# Patient Record
Sex: MALE | Race: BLACK OR AFRICAN AMERICAN | Employment: FULL TIME | URBAN - METROPOLITAN AREA
[De-identification: names, ages, dates, MRNs, and addresses within clinical notes are randomized per-mention and may not be internally consistent; named-entity substitution may affect disease eponyms.]

---

## 2018-06-01 ENCOUNTER — OFFICE VISIT (OUTPATIENT)
Dept: FAMILY MEDICINE CLINIC | Age: 48
End: 2018-06-01
Payer: COMMERCIAL

## 2018-06-01 VITALS
BODY MASS INDEX: 34.72 KG/M2 | SYSTOLIC BLOOD PRESSURE: 213 MMHG | HEART RATE: 63 BPM | DIASTOLIC BLOOD PRESSURE: 125 MMHG | WEIGHT: 248 LBS | HEIGHT: 71 IN | RESPIRATION RATE: 16 BRPM

## 2018-06-01 DIAGNOSIS — G57.01 PIRIFORMIS SYNDROME OF RIGHT SIDE: ICD-10-CM

## 2018-06-01 DIAGNOSIS — I10 ESSENTIAL HYPERTENSION: Primary | ICD-10-CM

## 2018-06-01 DIAGNOSIS — Z00.00 HEALTHCARE MAINTENANCE: ICD-10-CM

## 2018-06-01 PROCEDURE — G8427 DOCREV CUR MEDS BY ELIG CLIN: HCPCS | Performed by: STUDENT IN AN ORGANIZED HEALTH CARE EDUCATION/TRAINING PROGRAM

## 2018-06-01 PROCEDURE — G8417 CALC BMI ABV UP PARAM F/U: HCPCS | Performed by: STUDENT IN AN ORGANIZED HEALTH CARE EDUCATION/TRAINING PROGRAM

## 2018-06-01 PROCEDURE — 99203 OFFICE O/P NEW LOW 30 MIN: CPT | Performed by: STUDENT IN AN ORGANIZED HEALTH CARE EDUCATION/TRAINING PROGRAM

## 2018-06-01 PROCEDURE — 4004F PT TOBACCO SCREEN RCVD TLK: CPT | Performed by: STUDENT IN AN ORGANIZED HEALTH CARE EDUCATION/TRAINING PROGRAM

## 2018-06-01 PROCEDURE — 93000 ELECTROCARDIOGRAM COMPLETE: CPT | Performed by: STUDENT IN AN ORGANIZED HEALTH CARE EDUCATION/TRAINING PROGRAM

## 2018-06-01 RX ORDER — LISINOPRIL AND HYDROCHLOROTHIAZIDE 20; 12.5 MG/1; MG/1
1 TABLET ORAL DAILY
Qty: 15 TABLET | Refills: 0 | Status: SHIPPED | OUTPATIENT
Start: 2018-06-01 | End: 2019-01-10 | Stop reason: SDUPTHER

## 2018-06-01 RX ORDER — LISINOPRIL AND HYDROCHLOROTHIAZIDE 20; 12.5 MG/1; MG/1
1 TABLET ORAL DAILY
Qty: 30 TABLET | Refills: 3 | Status: SHIPPED | OUTPATIENT
Start: 2018-06-01 | End: 2018-06-01 | Stop reason: CLARIF

## 2018-06-01 RX ORDER — IBUPROFEN 200 MG
200 TABLET ORAL EVERY 6 HOURS PRN
COMMUNITY

## 2018-06-01 ASSESSMENT — ENCOUNTER SYMPTOMS
SHORTNESS OF BREATH: 1
NAUSEA: 0
VOMITING: 0
HEARTBURN: 0
BLURRED VISION: 0
ORTHOPNEA: 0
BACK PAIN: 1
WHEEZING: 0
SORE THROAT: 0
CONSTIPATION: 0
ABDOMINAL PAIN: 0
COUGH: 0
DIARRHEA: 0

## 2019-01-10 DIAGNOSIS — I10 ESSENTIAL HYPERTENSION: ICD-10-CM

## 2019-01-10 RX ORDER — LISINOPRIL AND HYDROCHLOROTHIAZIDE 20; 12.5 MG/1; MG/1
1 TABLET ORAL DAILY
Qty: 30 TABLET | Refills: 0 | Status: SHIPPED | OUTPATIENT
Start: 2019-01-10 | End: 2019-02-01 | Stop reason: SDUPTHER

## 2019-01-21 ENCOUNTER — TELEPHONE (OUTPATIENT)
Dept: ADMINISTRATIVE | Age: 49
End: 2019-01-21

## 2019-02-01 ENCOUNTER — OFFICE VISIT (OUTPATIENT)
Dept: FAMILY MEDICINE CLINIC | Age: 49
End: 2019-02-01
Payer: COMMERCIAL

## 2019-02-01 ENCOUNTER — HOSPITAL ENCOUNTER (OUTPATIENT)
Age: 49
Discharge: HOME | End: 2019-02-03
Payer: COMMERCIAL

## 2019-02-01 VITALS
DIASTOLIC BLOOD PRESSURE: 82 MMHG | RESPIRATION RATE: 18 BRPM | OXYGEN SATURATION: 98 % | BODY MASS INDEX: 33.32 KG/M2 | SYSTOLIC BLOOD PRESSURE: 138 MMHG | HEIGHT: 71 IN | HEART RATE: 78 BPM | WEIGHT: 238 LBS

## 2019-02-01 DIAGNOSIS — Z23 NEED FOR TDAP VACCINATION: ICD-10-CM

## 2019-02-01 DIAGNOSIS — I10 ESSENTIAL HYPERTENSION: ICD-10-CM

## 2019-02-01 DIAGNOSIS — R31.29 MICROHEMATURIA: Primary | ICD-10-CM

## 2019-02-01 DIAGNOSIS — R31.29 MICROHEMATURIA: ICD-10-CM

## 2019-02-01 DIAGNOSIS — Z11.3 SCREENING FOR STD (SEXUALLY TRANSMITTED DISEASE): ICD-10-CM

## 2019-02-01 LAB
APPEARANCE FLUID: ABNORMAL
BACTERIA: ABNORMAL /HPF
BILIRUBIN URINE: NEGATIVE
BILIRUBIN, POC: ABNORMAL
BLOOD URINE, POC: ABNORMAL
BLOOD, URINE: ABNORMAL
CLARITY, POC: ABNORMAL
CLARITY: ABNORMAL
COLOR, POC: YELLOW
COLOR: YELLOW
GLUCOSE URINE, POC: ABNORMAL
GLUCOSE URINE: NEGATIVE MG/DL
KETONES, POC: ABNORMAL
KETONES, URINE: NEGATIVE MG/DL
LEUKOCYTE EST, POC: ABNORMAL
LEUKOCYTE ESTERASE, URINE: ABNORMAL
NITRITE, POC: POSITIVE
NITRITE, URINE: POSITIVE
PH UA: 6 (ref 5–9)
PH, POC: 5.5
PROTEIN UA: NEGATIVE MG/DL
PROTEIN, POC: ABNORMAL
RBC UA: ABNORMAL /HPF (ref 0–2)
SPECIFIC GRAVITY UA: 1.02 (ref 1–1.03)
SPECIFIC GRAVITY, POC: 1.01
UROBILINOGEN, POC: 0.2
UROBILINOGEN, URINE: 0.2 E.U./DL
WBC UA: ABNORMAL /HPF (ref 0–5)

## 2019-02-01 PROCEDURE — 87491 CHLMYD TRACH DNA AMP PROBE: CPT

## 2019-02-01 PROCEDURE — G8417 CALC BMI ABV UP PARAM F/U: HCPCS | Performed by: STUDENT IN AN ORGANIZED HEALTH CARE EDUCATION/TRAINING PROGRAM

## 2019-02-01 PROCEDURE — G8427 DOCREV CUR MEDS BY ELIG CLIN: HCPCS | Performed by: STUDENT IN AN ORGANIZED HEALTH CARE EDUCATION/TRAINING PROGRAM

## 2019-02-01 PROCEDURE — 81002 URINALYSIS NONAUTO W/O SCOPE: CPT | Performed by: STUDENT IN AN ORGANIZED HEALTH CARE EDUCATION/TRAINING PROGRAM

## 2019-02-01 PROCEDURE — 4004F PT TOBACCO SCREEN RCVD TLK: CPT | Performed by: STUDENT IN AN ORGANIZED HEALTH CARE EDUCATION/TRAINING PROGRAM

## 2019-02-01 PROCEDURE — 87077 CULTURE AEROBIC IDENTIFY: CPT

## 2019-02-01 PROCEDURE — G8484 FLU IMMUNIZE NO ADMIN: HCPCS | Performed by: STUDENT IN AN ORGANIZED HEALTH CARE EDUCATION/TRAINING PROGRAM

## 2019-02-01 PROCEDURE — 90471 IMMUNIZATION ADMIN: CPT | Performed by: STUDENT IN AN ORGANIZED HEALTH CARE EDUCATION/TRAINING PROGRAM

## 2019-02-01 PROCEDURE — 81001 URINALYSIS AUTO W/SCOPE: CPT

## 2019-02-01 PROCEDURE — 90732 PPSV23 VACC 2 YRS+ SUBQ/IM: CPT | Performed by: STUDENT IN AN ORGANIZED HEALTH CARE EDUCATION/TRAINING PROGRAM

## 2019-02-01 PROCEDURE — 87088 URINE BACTERIA CULTURE: CPT

## 2019-02-01 PROCEDURE — 87186 SC STD MICRODIL/AGAR DIL: CPT

## 2019-02-01 PROCEDURE — 87591 N.GONORRHOEAE DNA AMP PROB: CPT

## 2019-02-01 PROCEDURE — 90715 TDAP VACCINE 7 YRS/> IM: CPT | Performed by: STUDENT IN AN ORGANIZED HEALTH CARE EDUCATION/TRAINING PROGRAM

## 2019-02-01 PROCEDURE — 90472 IMMUNIZATION ADMIN EACH ADD: CPT | Performed by: STUDENT IN AN ORGANIZED HEALTH CARE EDUCATION/TRAINING PROGRAM

## 2019-02-01 PROCEDURE — 99213 OFFICE O/P EST LOW 20 MIN: CPT | Performed by: STUDENT IN AN ORGANIZED HEALTH CARE EDUCATION/TRAINING PROGRAM

## 2019-02-01 RX ORDER — LISINOPRIL AND HYDROCHLOROTHIAZIDE 20; 12.5 MG/1; MG/1
1 TABLET ORAL DAILY
Qty: 30 TABLET | Refills: 3 | Status: SHIPPED | OUTPATIENT
Start: 2019-02-01 | End: 2019-03-06

## 2019-02-01 ASSESSMENT — PATIENT HEALTH QUESTIONNAIRE - PHQ9
SUM OF ALL RESPONSES TO PHQ QUESTIONS 1-9: 0
1. LITTLE INTEREST OR PLEASURE IN DOING THINGS: 0
SUM OF ALL RESPONSES TO PHQ9 QUESTIONS 1 & 2: 0
SUM OF ALL RESPONSES TO PHQ QUESTIONS 1-9: 0
2. FEELING DOWN, DEPRESSED OR HOPELESS: 0

## 2019-02-03 LAB
ORGANISM: ABNORMAL
URINE CULTURE, ROUTINE: ABNORMAL
URINE CULTURE, ROUTINE: ABNORMAL

## 2019-02-03 ASSESSMENT — ENCOUNTER SYMPTOMS
SHORTNESS OF BREATH: 1
SORE THROAT: 0
WHEEZING: 0
NAUSEA: 0
COUGH: 0
BACK PAIN: 0
VOMITING: 0
DIARRHEA: 0
ABDOMINAL PAIN: 0
CONSTIPATION: 0

## 2019-02-04 LAB
CHLAMYDIA TRACHOMATIS AMPLIFIED DET: NORMAL
N GONORRHOEAE AMPLIFIED DET: NORMAL

## 2019-02-11 DIAGNOSIS — N30.01 ACUTE CYSTITIS WITH HEMATURIA: Primary | ICD-10-CM

## 2019-02-11 RX ORDER — SULFAMETHOXAZOLE AND TRIMETHOPRIM 800; 160 MG/1; MG/1
1 TABLET ORAL 2 TIMES DAILY
Qty: 28 TABLET | Refills: 0 | Status: SHIPPED | OUTPATIENT
Start: 2019-02-11 | End: 2019-02-25

## 2019-02-25 ENCOUNTER — HOSPITAL ENCOUNTER (OUTPATIENT)
Dept: NON INVASIVE DIAGNOSTICS | Age: 49
Discharge: HOME | End: 2019-02-25
Payer: COMMERCIAL

## 2019-02-25 LAB
LV EF: 60 %
LVEF MODALITY: NORMAL

## 2019-02-25 PROCEDURE — 93306 TTE W/DOPPLER COMPLETE: CPT

## 2019-03-01 ENCOUNTER — OFFICE VISIT (OUTPATIENT)
Dept: FAMILY MEDICINE CLINIC | Age: 49
End: 2019-03-01
Payer: COMMERCIAL

## 2019-03-01 VITALS
DIASTOLIC BLOOD PRESSURE: 82 MMHG | HEIGHT: 71 IN | SYSTOLIC BLOOD PRESSURE: 148 MMHG | HEART RATE: 76 BPM | OXYGEN SATURATION: 98 % | WEIGHT: 238 LBS | RESPIRATION RATE: 16 BRPM | BODY MASS INDEX: 33.32 KG/M2

## 2019-03-01 DIAGNOSIS — I10 ESSENTIAL HYPERTENSION: Chronic | ICD-10-CM

## 2019-03-01 DIAGNOSIS — R06.09 DYSPNEA ON EXERTION: ICD-10-CM

## 2019-03-01 DIAGNOSIS — R73.09 ABNORMAL BLOOD SUGAR: Primary | ICD-10-CM

## 2019-03-01 LAB — HBA1C MFR BLD: 6.2 %

## 2019-03-01 PROCEDURE — G8484 FLU IMMUNIZE NO ADMIN: HCPCS | Performed by: STUDENT IN AN ORGANIZED HEALTH CARE EDUCATION/TRAINING PROGRAM

## 2019-03-01 PROCEDURE — G8427 DOCREV CUR MEDS BY ELIG CLIN: HCPCS | Performed by: STUDENT IN AN ORGANIZED HEALTH CARE EDUCATION/TRAINING PROGRAM

## 2019-03-01 PROCEDURE — 4004F PT TOBACCO SCREEN RCVD TLK: CPT | Performed by: STUDENT IN AN ORGANIZED HEALTH CARE EDUCATION/TRAINING PROGRAM

## 2019-03-01 PROCEDURE — 83036 HEMOGLOBIN GLYCOSYLATED A1C: CPT | Performed by: STUDENT IN AN ORGANIZED HEALTH CARE EDUCATION/TRAINING PROGRAM

## 2019-03-01 PROCEDURE — 99213 OFFICE O/P EST LOW 20 MIN: CPT | Performed by: STUDENT IN AN ORGANIZED HEALTH CARE EDUCATION/TRAINING PROGRAM

## 2019-03-01 PROCEDURE — G8417 CALC BMI ABV UP PARAM F/U: HCPCS | Performed by: STUDENT IN AN ORGANIZED HEALTH CARE EDUCATION/TRAINING PROGRAM

## 2019-03-01 RX ORDER — IRBESARTAN AND HYDROCHLOROTHIAZIDE 150; 12.5 MG/1; MG/1
1 TABLET, FILM COATED ORAL DAILY
Qty: 30 TABLET | Refills: 3 | Status: SHIPPED | OUTPATIENT
Start: 2019-03-01 | End: 2019-10-14 | Stop reason: SINTOL

## 2019-03-04 ENCOUNTER — HOSPITAL ENCOUNTER (OUTPATIENT)
Age: 49
Discharge: HOME | End: 2019-03-06
Payer: COMMERCIAL

## 2019-03-04 PROCEDURE — 88112 CYTOPATH CELL ENHANCE TECH: CPT

## 2019-03-06 ASSESSMENT — ENCOUNTER SYMPTOMS
SORE THROAT: 0
DIARRHEA: 0
COUGH: 1
BACK PAIN: 0
CONSTIPATION: 0
SHORTNESS OF BREATH: 1
VOMITING: 0
NAUSEA: 0
WHEEZING: 0
ABDOMINAL PAIN: 0

## 2019-10-14 ENCOUNTER — OFFICE VISIT (OUTPATIENT)
Dept: FAMILY MEDICINE CLINIC | Age: 49
End: 2019-10-14
Payer: COMMERCIAL

## 2019-10-14 VITALS
DIASTOLIC BLOOD PRESSURE: 114 MMHG | HEART RATE: 68 BPM | WEIGHT: 242 LBS | SYSTOLIC BLOOD PRESSURE: 196 MMHG | OXYGEN SATURATION: 97 % | RESPIRATION RATE: 18 BRPM | BODY MASS INDEX: 33.88 KG/M2 | HEIGHT: 71 IN

## 2019-10-14 DIAGNOSIS — I10 ESSENTIAL HYPERTENSION: Primary | ICD-10-CM

## 2019-10-14 PROCEDURE — G8427 DOCREV CUR MEDS BY ELIG CLIN: HCPCS | Performed by: STUDENT IN AN ORGANIZED HEALTH CARE EDUCATION/TRAINING PROGRAM

## 2019-10-14 PROCEDURE — 90471 IMMUNIZATION ADMIN: CPT | Performed by: FAMILY MEDICINE

## 2019-10-14 PROCEDURE — 4004F PT TOBACCO SCREEN RCVD TLK: CPT | Performed by: STUDENT IN AN ORGANIZED HEALTH CARE EDUCATION/TRAINING PROGRAM

## 2019-10-14 PROCEDURE — 3017F COLORECTAL CA SCREEN DOC REV: CPT | Performed by: STUDENT IN AN ORGANIZED HEALTH CARE EDUCATION/TRAINING PROGRAM

## 2019-10-14 PROCEDURE — G8482 FLU IMMUNIZE ORDER/ADMIN: HCPCS | Performed by: STUDENT IN AN ORGANIZED HEALTH CARE EDUCATION/TRAINING PROGRAM

## 2019-10-14 PROCEDURE — 90686 IIV4 VACC NO PRSV 0.5 ML IM: CPT | Performed by: FAMILY MEDICINE

## 2019-10-14 PROCEDURE — 99213 OFFICE O/P EST LOW 20 MIN: CPT | Performed by: STUDENT IN AN ORGANIZED HEALTH CARE EDUCATION/TRAINING PROGRAM

## 2019-10-14 PROCEDURE — G8417 CALC BMI ABV UP PARAM F/U: HCPCS | Performed by: STUDENT IN AN ORGANIZED HEALTH CARE EDUCATION/TRAINING PROGRAM

## 2019-10-14 RX ORDER — LISINOPRIL 40 MG/1
40 TABLET ORAL DAILY
Qty: 90 TABLET | Refills: 1 | Status: SHIPPED
Start: 2019-10-14 | End: 2020-03-02 | Stop reason: SDUPTHER

## 2019-10-14 RX ORDER — AMLODIPINE BESYLATE 10 MG/1
10 TABLET ORAL DAILY
Qty: 30 TABLET | Refills: 3 | Status: SHIPPED
Start: 2019-10-14 | End: 2020-03-02

## 2019-10-14 ASSESSMENT — ENCOUNTER SYMPTOMS
ABDOMINAL PAIN: 0
DIARRHEA: 0
SHORTNESS OF BREATH: 1
COUGH: 0
BACK PAIN: 0
RHINORRHEA: 0
CHOKING: 0
NAUSEA: 0
SORE THROAT: 0
WHEEZING: 0

## 2020-03-02 RX ORDER — LISINOPRIL 40 MG/1
40 TABLET ORAL DAILY
Qty: 30 TABLET | Refills: 0 | Status: ON HOLD | OUTPATIENT
Start: 2020-03-02 | End: 2021-05-14 | Stop reason: HOSPADM

## 2020-03-02 RX ORDER — AMLODIPINE BESYLATE 10 MG/1
10 TABLET ORAL DAILY
Qty: 30 TABLET | Refills: 0 | Status: SHIPPED
Start: 2020-03-02 | End: 2020-04-27

## 2020-04-27 RX ORDER — AMLODIPINE BESYLATE 10 MG/1
10 TABLET ORAL DAILY
Qty: 30 TABLET | Refills: 0 | Status: ON HOLD | OUTPATIENT
Start: 2020-04-27 | End: 2021-05-14 | Stop reason: HOSPADM

## 2021-05-02 ENCOUNTER — APPOINTMENT (OUTPATIENT)
Dept: GENERAL RADIOLOGY | Age: 51
DRG: 233 | End: 2021-05-02

## 2021-05-02 ENCOUNTER — HOSPITAL ENCOUNTER (INPATIENT)
Age: 51
LOS: 12 days | Discharge: HOME HEALTH | DRG: 233 | End: 2021-05-14
Attending: EMERGENCY MEDICINE | Admitting: FAMILY MEDICINE

## 2021-05-02 DIAGNOSIS — I21.4 NSTEMI (NON-ST ELEVATED MYOCARDIAL INFARCTION) (HCC): Primary | ICD-10-CM

## 2021-05-02 DIAGNOSIS — G89.18 POST-OP PAIN: ICD-10-CM

## 2021-05-02 DIAGNOSIS — Z95.1 S/P CABG (CORONARY ARTERY BYPASS GRAFT): ICD-10-CM

## 2021-05-02 PROBLEM — R07.9 CHEST PAIN: Status: ACTIVE | Noted: 2021-05-02

## 2021-05-02 LAB
ALBUMIN SERPL-MCNC: 4.2 G/DL (ref 3.5–5.2)
ALP BLD-CCNC: 62 U/L (ref 40–129)
ALT SERPL-CCNC: 17 U/L (ref 0–40)
ANION GAP SERPL CALCULATED.3IONS-SCNC: 13 MMOL/L (ref 7–16)
APTT: 74.7 SEC (ref 24.5–35.1)
APTT: 75.3 SEC (ref 24.5–35.1)
APTT: >240 SEC (ref 24.5–35.1)
APTT: >240 SEC (ref 24.5–35.1)
AST SERPL-CCNC: 25 U/L (ref 0–39)
BASOPHILS ABSOLUTE: 0.03 E9/L (ref 0–0.2)
BASOPHILS RELATIVE PERCENT: 0.4 % (ref 0–2)
BILIRUB SERPL-MCNC: 0.2 MG/DL (ref 0–1.2)
BUN BLDV-MCNC: 13 MG/DL (ref 6–20)
CALCIUM SERPL-MCNC: 9.4 MG/DL (ref 8.6–10.2)
CHLORIDE BLD-SCNC: 105 MMOL/L (ref 98–107)
CHOLESTEROL, TOTAL: 230 MG/DL (ref 0–199)
CK MB: 20.2 NG/ML (ref 0–7.7)
CO2: 26 MMOL/L (ref 22–29)
CREAT SERPL-MCNC: 1.1 MG/DL (ref 0.7–1.2)
D DIMER: <200 NG/ML DDU
EKG ATRIAL RATE: 63 BPM
EKG ATRIAL RATE: 71 BPM
EKG P AXIS: 52 DEGREES
EKG P AXIS: 59 DEGREES
EKG P-R INTERVAL: 160 MS
EKG P-R INTERVAL: 162 MS
EKG Q-T INTERVAL: 408 MS
EKG Q-T INTERVAL: 458 MS
EKG QRS DURATION: 106 MS
EKG QRS DURATION: 94 MS
EKG QTC CALCULATION (BAZETT): 443 MS
EKG QTC CALCULATION (BAZETT): 468 MS
EKG R AXIS: 36 DEGREES
EKG R AXIS: 39 DEGREES
EKG T AXIS: 134 DEGREES
EKG T AXIS: 62 DEGREES
EKG VENTRICULAR RATE: 63 BPM
EKG VENTRICULAR RATE: 71 BPM
EOSINOPHILS ABSOLUTE: 0.34 E9/L (ref 0.05–0.5)
EOSINOPHILS RELATIVE PERCENT: 4.7 % (ref 0–6)
GFR AFRICAN AMERICAN: >60
GFR NON-AFRICAN AMERICAN: >60 ML/MIN/1.73
GLUCOSE BLD-MCNC: 150 MG/DL (ref 74–99)
HBA1C MFR BLD: 5.7 % (ref 4–5.6)
HBA1C MFR BLD: 5.8 % (ref 4–5.6)
HCT VFR BLD CALC: 47.2 % (ref 37–54)
HCT VFR BLD CALC: 48.1 % (ref 37–54)
HDLC SERPL-MCNC: 37 MG/DL
HEMOGLOBIN: 15.6 G/DL (ref 12.5–16.5)
HEMOGLOBIN: 16 G/DL (ref 12.5–16.5)
IMMATURE GRANULOCYTES #: 0.02 E9/L
IMMATURE GRANULOCYTES %: 0.3 % (ref 0–5)
INR BLD: 1.2
LACTIC ACID: 1.2 MMOL/L (ref 0.5–2.2)
LACTIC ACID: 2.3 MMOL/L (ref 0.5–2.2)
LDL CHOLESTEROL CALCULATED: 177 MG/DL (ref 0–99)
LIPASE: 17 U/L (ref 13–60)
LYMPHOCYTES ABSOLUTE: 2.11 E9/L (ref 1.5–4)
LYMPHOCYTES RELATIVE PERCENT: 28.9 % (ref 20–42)
MCH RBC QN AUTO: 32.5 PG (ref 26–35)
MCH RBC QN AUTO: 32.6 PG (ref 26–35)
MCHC RBC AUTO-ENTMCNC: 33.1 % (ref 32–34.5)
MCHC RBC AUTO-ENTMCNC: 33.3 % (ref 32–34.5)
MCV RBC AUTO: 97.8 FL (ref 80–99.9)
MCV RBC AUTO: 98.5 FL (ref 80–99.9)
METER GLUCOSE: 132 MG/DL (ref 74–99)
MONOCYTES ABSOLUTE: 0.33 E9/L (ref 0.1–0.95)
MONOCYTES RELATIVE PERCENT: 4.5 % (ref 2–12)
NEUTROPHILS ABSOLUTE: 4.48 E9/L (ref 1.8–7.3)
NEUTROPHILS RELATIVE PERCENT: 61.2 % (ref 43–80)
PDW BLD-RTO: 12.9 FL (ref 11.5–15)
PDW BLD-RTO: 12.9 FL (ref 11.5–15)
PLATELET # BLD: 301 E9/L (ref 130–450)
PLATELET # BLD: 332 E9/L (ref 130–450)
PMV BLD AUTO: 8.8 FL (ref 7–12)
PMV BLD AUTO: 9.1 FL (ref 7–12)
POTASSIUM SERPL-SCNC: 3.7 MMOL/L (ref 3.5–5)
PRO-BNP: 1468 PG/ML (ref 0–125)
PROCALCITONIN: 0.08 NG/ML (ref 0–0.08)
PROTHROMBIN TIME: 12.7 SEC (ref 9.3–12.4)
RBC # BLD: 4.79 E12/L (ref 3.8–5.8)
RBC # BLD: 4.92 E12/L (ref 3.8–5.8)
REASON FOR REJECTION: NORMAL
REASON FOR REJECTION: NORMAL
REJECTED TEST: NORMAL
REJECTED TEST: NORMAL
SARS-COV-2, NAAT: NOT DETECTED
SODIUM BLD-SCNC: 144 MMOL/L (ref 132–146)
TOTAL CK: 455 U/L (ref 20–200)
TOTAL PROTEIN: 7 G/DL (ref 6.4–8.3)
TRIGL SERPL-MCNC: 80 MG/DL (ref 0–149)
TROPONIN: 0.53 NG/ML (ref 0–0.03)
TROPONIN: 0.7 NG/ML (ref 0–0.03)
TROPONIN: 0.82 NG/ML (ref 0–0.03)
VLDLC SERPL CALC-MCNC: 16 MG/DL
WBC # BLD: 7.3 E9/L (ref 4.5–11.5)
WBC # BLD: 8 E9/L (ref 4.5–11.5)

## 2021-05-02 PROCEDURE — 6370000000 HC RX 637 (ALT 250 FOR IP): Performed by: FAMILY MEDICINE

## 2021-05-02 PROCEDURE — 71045 X-RAY EXAM CHEST 1 VIEW: CPT

## 2021-05-02 PROCEDURE — 36415 COLL VENOUS BLD VENIPUNCTURE: CPT

## 2021-05-02 PROCEDURE — 80061 LIPID PANEL: CPT

## 2021-05-02 PROCEDURE — 6370000000 HC RX 637 (ALT 250 FOR IP): Performed by: EMERGENCY MEDICINE

## 2021-05-02 PROCEDURE — 85730 THROMBOPLASTIN TIME PARTIAL: CPT

## 2021-05-02 PROCEDURE — 2580000003 HC RX 258

## 2021-05-02 PROCEDURE — 2060000000 HC ICU INTERMEDIATE R&B

## 2021-05-02 PROCEDURE — 6370000000 HC RX 637 (ALT 250 FOR IP): Performed by: INTERNAL MEDICINE

## 2021-05-02 PROCEDURE — 84484 ASSAY OF TROPONIN QUANT: CPT

## 2021-05-02 PROCEDURE — 93010 ELECTROCARDIOGRAM REPORT: CPT | Performed by: INTERNAL MEDICINE

## 2021-05-02 PROCEDURE — APPSS60 APP SPLIT SHARED TIME 46-60 MINUTES: Performed by: PHYSICIAN ASSISTANT

## 2021-05-02 PROCEDURE — 2580000003 HC RX 258: Performed by: NURSE PRACTITIONER

## 2021-05-02 PROCEDURE — 85025 COMPLETE CBC W/AUTO DIFF WBC: CPT

## 2021-05-02 PROCEDURE — 82962 GLUCOSE BLOOD TEST: CPT

## 2021-05-02 PROCEDURE — 2500000003 HC RX 250 WO HCPCS: Performed by: EMERGENCY MEDICINE

## 2021-05-02 PROCEDURE — 99284 EMERGENCY DEPT VISIT MOD MDM: CPT

## 2021-05-02 PROCEDURE — 83690 ASSAY OF LIPASE: CPT

## 2021-05-02 PROCEDURE — 83880 ASSAY OF NATRIURETIC PEPTIDE: CPT

## 2021-05-02 PROCEDURE — 96365 THER/PROPH/DIAG IV INF INIT: CPT

## 2021-05-02 PROCEDURE — 96375 TX/PRO/DX INJ NEW DRUG ADDON: CPT

## 2021-05-02 PROCEDURE — 2580000003 HC RX 258: Performed by: FAMILY MEDICINE

## 2021-05-02 PROCEDURE — 83036 HEMOGLOBIN GLYCOSYLATED A1C: CPT

## 2021-05-02 PROCEDURE — 85610 PROTHROMBIN TIME: CPT

## 2021-05-02 PROCEDURE — 80053 COMPREHEN METABOLIC PANEL: CPT

## 2021-05-02 PROCEDURE — 99255 IP/OBS CONSLTJ NEW/EST HI 80: CPT | Performed by: INTERNAL MEDICINE

## 2021-05-02 PROCEDURE — 85027 COMPLETE CBC AUTOMATED: CPT

## 2021-05-02 PROCEDURE — 6360000002 HC RX W HCPCS: Performed by: EMERGENCY MEDICINE

## 2021-05-02 PROCEDURE — 82553 CREATINE MB FRACTION: CPT

## 2021-05-02 PROCEDURE — 2700000000 HC OXYGEN THERAPY PER DAY

## 2021-05-02 PROCEDURE — 84145 PROCALCITONIN (PCT): CPT

## 2021-05-02 PROCEDURE — 6360000002 HC RX W HCPCS: Performed by: PHYSICIAN ASSISTANT

## 2021-05-02 PROCEDURE — C9113 INJ PANTOPRAZOLE SODIUM, VIA: HCPCS | Performed by: EMERGENCY MEDICINE

## 2021-05-02 PROCEDURE — 87635 SARS-COV-2 COVID-19 AMP PRB: CPT

## 2021-05-02 PROCEDURE — 93005 ELECTROCARDIOGRAM TRACING: CPT | Performed by: EMERGENCY MEDICINE

## 2021-05-02 PROCEDURE — 83605 ASSAY OF LACTIC ACID: CPT

## 2021-05-02 PROCEDURE — 6360000002 HC RX W HCPCS: Performed by: FAMILY MEDICINE

## 2021-05-02 PROCEDURE — 82550 ASSAY OF CK (CPK): CPT

## 2021-05-02 PROCEDURE — 85378 FIBRIN DEGRADE SEMIQUANT: CPT

## 2021-05-02 RX ORDER — ASPIRIN 81 MG/1
81 TABLET, CHEWABLE ORAL DAILY
Status: DISCONTINUED | OUTPATIENT
Start: 2021-05-03 | End: 2021-05-10

## 2021-05-02 RX ORDER — NITROGLYCERIN 0.4 MG/1
0.4 TABLET SUBLINGUAL ONCE
Status: DISCONTINUED | OUTPATIENT
Start: 2021-05-02 | End: 2021-05-10

## 2021-05-02 RX ORDER — ASPIRIN 81 MG/1
324 TABLET, CHEWABLE ORAL ONCE
Status: DISCONTINUED | OUTPATIENT
Start: 2021-05-02 | End: 2021-05-03

## 2021-05-02 RX ORDER — HEPARIN SODIUM 1000 [USP'U]/ML
80 INJECTION, SOLUTION INTRAVENOUS; SUBCUTANEOUS PRN
Status: DISCONTINUED | OUTPATIENT
Start: 2021-05-02 | End: 2021-05-02 | Stop reason: DRUGHIGH

## 2021-05-02 RX ORDER — ONDANSETRON 2 MG/ML
4 INJECTION INTRAMUSCULAR; INTRAVENOUS EVERY 6 HOURS PRN
Status: DISCONTINUED | OUTPATIENT
Start: 2021-05-02 | End: 2021-05-10

## 2021-05-02 RX ORDER — LISINOPRIL 20 MG/1
40 TABLET ORAL DAILY
Status: DISCONTINUED | OUTPATIENT
Start: 2021-05-02 | End: 2021-05-02

## 2021-05-02 RX ORDER — SODIUM CHLORIDE 9 MG/ML
INJECTION, SOLUTION INTRAVENOUS CONTINUOUS
Status: DISCONTINUED | OUTPATIENT
Start: 2021-05-02 | End: 2021-05-02

## 2021-05-02 RX ORDER — SODIUM CHLORIDE 9 MG/ML
25 INJECTION, SOLUTION INTRAVENOUS PRN
Status: DISCONTINUED | OUTPATIENT
Start: 2021-05-02 | End: 2021-05-03 | Stop reason: SDUPTHER

## 2021-05-02 RX ORDER — ACETAMINOPHEN 325 MG/1
650 TABLET ORAL EVERY 6 HOURS PRN
Status: DISCONTINUED | OUTPATIENT
Start: 2021-05-02 | End: 2021-05-10

## 2021-05-02 RX ORDER — HEPARIN SODIUM 10000 [USP'U]/100ML
18 INJECTION, SOLUTION INTRAVENOUS CONTINUOUS
Status: DISCONTINUED | OUTPATIENT
Start: 2021-05-02 | End: 2021-05-02 | Stop reason: DRUGHIGH

## 2021-05-02 RX ORDER — HEPARIN SODIUM 1000 [USP'U]/ML
80 INJECTION, SOLUTION INTRAVENOUS; SUBCUTANEOUS ONCE
Status: COMPLETED | OUTPATIENT
Start: 2021-05-02 | End: 2021-05-02

## 2021-05-02 RX ORDER — LISINOPRIL 20 MG/1
40 TABLET ORAL DAILY
Status: DISCONTINUED | OUTPATIENT
Start: 2021-05-02 | End: 2021-05-08

## 2021-05-02 RX ORDER — AMLODIPINE BESYLATE 10 MG/1
10 TABLET ORAL DAILY
Status: DISCONTINUED | OUTPATIENT
Start: 2021-05-02 | End: 2021-05-10

## 2021-05-02 RX ORDER — ASPIRIN 81 MG/1
81 TABLET, CHEWABLE ORAL DAILY
Status: DISCONTINUED | OUTPATIENT
Start: 2021-05-02 | End: 2021-05-02

## 2021-05-02 RX ORDER — HEPARIN SODIUM 1000 [USP'U]/ML
40 INJECTION, SOLUTION INTRAVENOUS; SUBCUTANEOUS PRN
Status: DISCONTINUED | OUTPATIENT
Start: 2021-05-02 | End: 2021-05-02 | Stop reason: DRUGHIGH

## 2021-05-02 RX ORDER — PROMETHAZINE HYDROCHLORIDE 25 MG/1
12.5 TABLET ORAL EVERY 6 HOURS PRN
Status: DISCONTINUED | OUTPATIENT
Start: 2021-05-02 | End: 2021-05-10

## 2021-05-02 RX ORDER — POLYETHYLENE GLYCOL 3350 17 G/17G
17 POWDER, FOR SOLUTION ORAL DAILY PRN
Status: DISCONTINUED | OUTPATIENT
Start: 2021-05-02 | End: 2021-05-10

## 2021-05-02 RX ORDER — HEPARIN SODIUM 1000 [USP'U]/ML
4000 INJECTION, SOLUTION INTRAVENOUS; SUBCUTANEOUS PRN
Status: DISCONTINUED | OUTPATIENT
Start: 2021-05-02 | End: 2021-05-10

## 2021-05-02 RX ORDER — FUROSEMIDE 10 MG/ML
20 INJECTION INTRAMUSCULAR; INTRAVENOUS ONCE
Status: COMPLETED | OUTPATIENT
Start: 2021-05-02 | End: 2021-05-02

## 2021-05-02 RX ORDER — PANTOPRAZOLE SODIUM 40 MG/10ML
40 INJECTION, POWDER, LYOPHILIZED, FOR SOLUTION INTRAVENOUS ONCE
Status: COMPLETED | OUTPATIENT
Start: 2021-05-02 | End: 2021-05-02

## 2021-05-02 RX ORDER — SODIUM CHLORIDE 0.9 % (FLUSH) 0.9 %
5-40 SYRINGE (ML) INJECTION EVERY 12 HOURS SCHEDULED
Status: DISCONTINUED | OUTPATIENT
Start: 2021-05-02 | End: 2021-05-03 | Stop reason: SDUPTHER

## 2021-05-02 RX ORDER — HEPARIN SODIUM 1000 [USP'U]/ML
2000 INJECTION, SOLUTION INTRAVENOUS; SUBCUTANEOUS PRN
Status: DISCONTINUED | OUTPATIENT
Start: 2021-05-02 | End: 2021-05-10

## 2021-05-02 RX ORDER — ATORVASTATIN CALCIUM 80 MG/1
80 TABLET, FILM COATED ORAL NIGHTLY
Status: DISCONTINUED | OUTPATIENT
Start: 2021-05-02 | End: 2021-05-10

## 2021-05-02 RX ORDER — NITROGLYCERIN 20 MG/100ML
5-200 INJECTION INTRAVENOUS CONTINUOUS
Status: DISCONTINUED | OUTPATIENT
Start: 2021-05-02 | End: 2021-05-04

## 2021-05-02 RX ORDER — SODIUM CHLORIDE 0.9 % (FLUSH) 0.9 %
5-40 SYRINGE (ML) INJECTION PRN
Status: DISCONTINUED | OUTPATIENT
Start: 2021-05-02 | End: 2021-05-03 | Stop reason: SDUPTHER

## 2021-05-02 RX ORDER — HEPARIN SODIUM 10000 [USP'U]/100ML
5-30 INJECTION, SOLUTION INTRAVENOUS CONTINUOUS
Status: DISCONTINUED | OUTPATIENT
Start: 2021-05-02 | End: 2021-05-09

## 2021-05-02 RX ORDER — ACETAMINOPHEN 650 MG/1
650 SUPPOSITORY RECTAL EVERY 6 HOURS PRN
Status: DISCONTINUED | OUTPATIENT
Start: 2021-05-02 | End: 2021-05-10

## 2021-05-02 RX ORDER — ASPIRIN 81 MG/1
81 TABLET, CHEWABLE ORAL DAILY
COMMUNITY

## 2021-05-02 RX ADMIN — NITROGLYCERIN 5 MCG/MIN: 20 INJECTION INTRAVENOUS at 05:05

## 2021-05-02 RX ADMIN — TICAGRELOR 180 MG: 90 TABLET ORAL at 05:06

## 2021-05-02 RX ADMIN — HEPARIN SODIUM 8780 UNITS: 1000 INJECTION, SOLUTION INTRAVENOUS; SUBCUTANEOUS at 04:37

## 2021-05-02 RX ADMIN — HEPARIN SODIUM 9.1 UNITS/KG/HR: 10000 INJECTION, SOLUTION INTRAVENOUS at 07:15

## 2021-05-02 RX ADMIN — HEPARIN SODIUM 18 UNITS/KG/HR: 10000 INJECTION, SOLUTION INTRAVENOUS at 04:40

## 2021-05-02 RX ADMIN — PANTOPRAZOLE SODIUM 40 MG: 40 INJECTION, POWDER, FOR SOLUTION INTRAVENOUS at 03:04

## 2021-05-02 RX ADMIN — ATORVASTATIN CALCIUM 80 MG: 80 TABLET, FILM COATED ORAL at 20:34

## 2021-05-02 RX ADMIN — FUROSEMIDE 20 MG: 10 INJECTION, SOLUTION INTRAMUSCULAR; INTRAVENOUS at 11:13

## 2021-05-02 RX ADMIN — Medication 10 ML: at 20:40

## 2021-05-02 RX ADMIN — ACETAMINOPHEN 650 MG: 325 TABLET ORAL at 18:12

## 2021-05-02 RX ADMIN — LISINOPRIL 40 MG: 20 TABLET ORAL at 09:25

## 2021-05-02 RX ADMIN — AMLODIPINE BESYLATE 10 MG: 10 TABLET ORAL at 14:54

## 2021-05-02 RX ADMIN — WATER 10 ML: 1 INJECTION INTRAMUSCULAR; INTRAVENOUS; SUBCUTANEOUS at 03:04

## 2021-05-02 RX ADMIN — MAGNESIUM HYDROXIDE/ALUMINUM HYDROXICE/SIMETHICONE: 120; 1200; 1200 SUSPENSION ORAL at 03:04

## 2021-05-02 RX ADMIN — Medication 10 ML: at 09:25

## 2021-05-02 RX ADMIN — TICAGRELOR 90 MG: 90 TABLET ORAL at 18:12

## 2021-05-02 ASSESSMENT — PAIN DESCRIPTION - PAIN TYPE
TYPE: ACUTE PAIN
TYPE: ACUTE PAIN

## 2021-05-02 ASSESSMENT — PAIN DESCRIPTION - LOCATION
LOCATION: CHEST
LOCATION: HEAD

## 2021-05-02 ASSESSMENT — PAIN SCALES - GENERAL
PAINLEVEL_OUTOF10: 0

## 2021-05-02 ASSESSMENT — PAIN DESCRIPTION - FREQUENCY
FREQUENCY: CONTINUOUS
FREQUENCY: CONTINUOUS

## 2021-05-02 ASSESSMENT — PAIN DESCRIPTION - ORIENTATION: ORIENTATION: MID

## 2021-05-02 ASSESSMENT — PAIN SCALES - WONG BAKER: WONGBAKER_NUMERICALRESPONSE: 4

## 2021-05-02 ASSESSMENT — PAIN DESCRIPTION - ONSET: ONSET: ON-GOING

## 2021-05-02 ASSESSMENT — PAIN DESCRIPTION - DESCRIPTORS: DESCRIPTORS: DISCOMFORT

## 2021-05-02 NOTE — CONSULTS
Inpatient Cardiology Consultation      Reason for Consult: NSTEMI    Consulting Physician: Dr. Akiko Gonzalez    Requesting Physician: Dr. Esteban Ricks    Date of Consultation: 5/2/2021    HISTORY OF PRESENT ILLNESS:   Patient is a 45year old AAM who is not previously known to [address] [place] [address] Cardiology. Patient is being seen in consultation this hospital admission by Dr. Akiko Gonzalez for evaluation and recommendations regarding NSTEMI. Patient has a known past medical history of tobacco abuse, obesity and hypertension. He denies any personal history of hyperlipidemia, diabetes mellitus, coronary artery disease, heart failure, myocardial infarction, valvular heart disease or cardiac arrhythmia. Patient denies ever being evaluated by a cardiologist in the past, and denies any prior stress testing or left heart catheterization. Patient presented to [facility] on May 2, 2021 due to complaints of chest discomfort and shortness of breath. Per the patient, over the past couple of months, he has been having intermittent episodes of chest discomfort. He states initially the chest discomfort may be occurred once every other day, but over the past week or so has become more frequent. Patient states typically the chest discomfort was located midsternally/in the epigastric region with occasional radiation into his left arm. However, yesterday evening, patient states that the discomfort initially began in his middle back and then shortly after he began to notice chest discomfort as well. Patient initially attributed the chest discomfort to eating late at night prior to going to sleep. However, on interview, patient states he also had episodes of chest discomfort while at work during the day. He believes some of the chest discomfort is related to meals, particularly the chest discomfort he has at night. However, while he is at work, he has also been noticing this chest discomfort with exertion.   Again, the discomfort is located midsternally with radiation to his left arm. He at times has associated nausea, emesis and diaphoresis with the episodes of chest discomfort. He states that rest does help the discomfort. Duration of each episode of chest discomfort varies, sometimes lasting a few minutes while at other times lasting up to an hour before resolution. There are no other specific alleviating factors regarding the chest discomfort. Patient states that the discomfort is not tender to palpation, or related to cough or deep inspiration. He notes that early this morning, he was woken up by severe chest discomfort, as well as new onset acute shortness of breath at rest.  The chest discomfort early this morning started in his middle back, and eventually noticed discomfort in his chest midsternally with radiation into his left arm. He noted of diaphoresis, nausea and acute shortness of breath at rest.  As a result, patient decided to come to the ED for further evaluation. He states that the chest discomfort was 10/10 in severity. Once patient was evaluated in the ED and started on nitroglycerin drip, his chest discomfort completely resolved. He has been chest pain-free since then, with no recurrence. He denies any complaints of current nausea, emesis, diaphoresis, dizziness, palpitations, near-syncope or syncope. He denies orthopnea or paroxysmal nocturnal dyspnea. He denies subjective fever, chills, cough or any known recent sick contacts. He notes of being a tobacco smoker, smokes about five to eight cigars/day. Denies former or current alcohol or illicit drug use. Notes of his father having a history of CVA and CHF. Denies any other pertinent cardiac family medical history to me at this time. Labs and diagnostic testing as noted below. Please note: past medical records were reviewed per electronic medical record (EMR) - see detailed reports under Past Medical/ Surgical History. PAST MEDICAL HISTORY:    1. Tobacco abuse. 2. Obesity. 3. Hypertension. CARDIAC TESTING    Echocardiogram (Dr. Rachele De La Rosa, 2/2019)   Summary   Left ventricular size is grossly normal.   Mild left ventricular concentric hypertrophy noted. Ejection fraction is visually estimated at 60%. Normal left ventricular diastolic filling pattern for age. Mild tricuspid regurgitation. RVSP is 14 mmHg. Normal right ventricular size and function. PAST SURGICAL HISTORY:    History reviewed. No pertinent surgical history. HOME MEDICATIONS:  Prior to Admission medications    Medication Sig Start Date End Date Taking?  Authorizing Provider   aspirin 81 MG chewable tablet Take 81 mg by mouth daily   Yes Historical Provider, MD   lisinopril (PRINIVIL;ZESTRIL) 40 MG tablet Take 1 tablet by mouth daily 3/2/20  Yes Joy Becerra MD   amLODIPine (NORVASC) 10 MG tablet TAKE 1 TABLET BY MOUTH DAILY 4/27/20   Sheryl Morse MD   ibuprofen (ADVIL;MOTRIN) 200 MG tablet Take 200 mg by mouth every 6 hours as needed for Pain    Historical Provider, MD       CURRENT MEDICATIONS:      Current Facility-Administered Medications:     nitroGLYCERIN (NITROSTAT) SL tablet 0.4 mg, 0.4 mg, Sublingual, Once, Tosha Dean DO    aspirin chewable tablet 324 mg, 324 mg, Oral, Once, Tosha Dean DO    nitroGLYCERIN 50 mg in dextrose 5% 250 mL infusion, 5-200 mcg/min, Intravenous, Continuous, Tosha Dean DO, Last Rate: 1.5 mL/hr at 05/02/21 0505, 5 mcg/min at 05/02/21 0505    [START ON 5/3/2021] aspirin chewable tablet 81 mg, 81 mg, Oral, Daily, Kanwal Lunsford MD    lisinopril (PRINIVIL;ZESTRIL) tablet 40 mg, 40 mg, Oral, Daily, Kanwal Lunsford MD, 40 mg at 05/02/21 0925    sodium chloride flush 0.9 % injection 5-40 mL, 5-40 mL, Intravenous, 2 times per day, Kanwal Lunsford MD, 10 mL at 05/02/21 0925    sodium chloride flush 0.9 % injection 5-40 mL, 5-40 mL, Intravenous, PRN, Kanwal Lunsford MD    0.9 % sodium chloride infusion, 25 mL, Intravenous, PRN, Tricia Gooden MD    promethazine (PHENERGAN) tablet 12.5 mg, 12.5 mg, Oral, Q6H PRN **OR** ondansetron (ZOFRAN) injection 4 mg, 4 mg, Intravenous, Q6H PRN, Tricia Gooden MD    acetaminophen (TYLENOL) tablet 650 mg, 650 mg, Oral, Q6H PRN **OR** acetaminophen (TYLENOL) suppository 650 mg, 650 mg, Rectal, Q6H PRN, Tricia Gooden MD    polyethylene glycol (GLYCOLAX) packet 17 g, 17 g, Oral, Daily PRN, Tricia Gooden MD    atorvastatin (LIPITOR) tablet 80 mg, 80 mg, Oral, Nightly, Tricia Gooden MD    perflutren lipid microspheres (DEFINITY) injection 1.65 mg, 1.5 mL, Intravenous, ONCE PRN, Tricia Gooden MD    heparin (porcine) injection 4,000 Units, 4,000 Units, Intravenous, PRN, Tricia Gooden MD    heparin (porcine) injection 2,000 Units, 2,000 Units, Intravenous, PRN, Tricia Gooden MD    heparin 25,000 units in dextrose 5% 250 mL (premix) infusion, 5-30 Units/kg/hr, Intravenous, Continuous, Tiffanie Masterson MD, Last Rate: 10 mL/hr at 05/02/21 0715, 9.1 Units/kg/hr at 05/02/21 0715    0.9 % sodium chloride infusion, , Intravenous, Continuous, LINDA Foster NP, Last Rate: 100 mL/hr at 05/02/21 0831, New Bag at 05/02/21 0831      ALLERGIES:  Patient has no known allergies. SOCIAL HISTORY:    He notes of being a tobacco smoker, smokes about five to eight cigars/day. Denies former or current alcohol or illicit drug use. FAMILY HISTORY:   Notes of his father having a history of CVA and CHF. Denies any other pertinent cardiac family medical history to me at this time. REVIEW OF SYSTEMS:     · Constitutional: Denies fevers, chills, night sweats, and generalized fatigue. Denies significant weight loss or weight gain. · HEENT: Denies headaches, nose bleeds, rhinorrhea, sore throat. Denies blurred vision. Denies dysphagia, odynophagia. · Musculoskeletal: Denies falls, pain to BLE with ambulation. Denies muscle weakness.   · Neurological: Denies dizziness and lightheadedness, numbness and tingling. Denies focal neurological deficits. · Cardiovascular: +chest pain, +diaphoresis. Denies palpitations, near syncope, syncope. Denies PND, orthopnea, peripheral edema. · Respiratory: +shortness of breath at rest. Denies cough, hemoptysis. · Gastrointestinal: +nausea, +emesis. Denies abdominal pain, diarrhea and constipation, black/bloody, and tarry stools. · Genitourinary: Denies dysuria and hematuria. · Hematologic: Denies excessive bruising or bleeding. · Endocrine: Denies excessive thirst. Denies intolerance to hot and cold. PHYSICAL EXAM:   BP (!) 163/93   Pulse 50   Temp 97 °F (36.1 °C) (Temporal)   Resp 20   Ht 5' 11\" (1.803 m)   Wt 242 lb (109.8 kg)   SpO2 98%   BMI 33.75 kg/m²   CONST:  Well developed, obese AAM who appears stated age. Awake, alert, cooperative. HEENT:   Head- Normocephalic, atraumatic. Eyes- Conjunctivae pink, anicteric. Neck-  No stridor, trachea midline, no apparent jugular venous distention. CHEST: Chest symmetrical and non-tender to palpation. No accessory muscle use or intercostal retractions. RESPIRATORY: Lung sounds - diminished with rare rales. CARDIOVASCULAR:     No noted carotid bruit. Heart Ausculation- Regular rhythm with slow rate, no apparent murmur. PV: No lower extremity edema. Pedal pulses palpable, no clubbing or cyanosis. ABDOMEN: Soft, non-tender to light palpation. Bowel sounds present. MS: Good muscle strength and tone. No atrophy or abnormal movements. SKIN: Warm and dry. NEURO / PSYCH: Oriented to person, place and time. Speech clear and appropriate. Follows all commands. DATA:    Telemetry: SB with HR in the 50s    Diagnostic:  All diagnostic testing and lab work thus far this admission reviewed in detail. CXR 5/2/2021:  Impression  Left greater than right parenchymal infiltrates likely representing  pneumonia.  Asymmetric edema less likely.  Continued follow-up recommended.       No

## 2021-05-02 NOTE — PROGRESS NOTES
Notified Isaias Tootie via perfect serve that troponin is 0.82 fluid leaking x 24 hours, contractions and back pain fluid leaking x 24 hours since 4pm yesterday, contractions and back pain

## 2021-05-02 NOTE — ED NOTES
sbar faxed, floor notified, spoke with [facility], patient continues to deny chest pain     Lonnie Mathews RN  05/02/21 3593

## 2021-05-02 NOTE — PROGRESS NOTES
New consult for cardiology was replaced at this time. The following message was sent via [email or web address] New consult for NSTEMI who was admitted last nightt Dr. Lee Rea was called in ER per primary. I seen the consult was called this am but I wanted to make sure that he was aware. At 0830 the patient was standing at bedside and his heart rate dropped to 40's, he was symptomatic and became diaphoretic. Sugar was taken and it was 132, blood pressure was 163/93 and heart rate went back to 50's after the patient was lying down. Heart rate is now 73 and patient feels better. Troponin was 0.7 at 0630. Primary has seen the patient this morning and ordered and EKG. That has been completed and they wanted me to let you know.  Thanks

## 2021-05-02 NOTE — CONSULTS
Patient seen and examined. Chart, labs, imaging studies, EKG and rhythm strips reviewed. Full consult to follow. We were asked to see the patient for NSTEMI. IMPRESSION:  1. NSTEMI (likely LAD distribution). No recurrence of chest pain since admission. 2. Hypertension, not adequately controlled. 3. Hypercholesterolemia (with low HDL). 4. Tobacco abuse. 5. Sinus bradycardia. 6. Obesity. PLAN:   1. Resume Norvasc. 2. Start Brilinta (received loading dose in ED early this AM). 3. Monitor HR: consider low dose beta-blocker therapy (bradycardia at present precludes use). 4. Continue IV nitroglycerin and IV heparin, lisinopril, aspirin and statin. 5. Cath tomorrow AM, risks, benefits and alternative therapies discussed with patient. He understood and consented to proceed. AUC score 9 +4. NPO at midnight. 6. Will follow.      Electronically signed by Baldo Desouza MD on 5/2/2021 at 1:23 PM

## 2021-05-02 NOTE — H&P
Hospitalist History & Physical      PCP: Gaby Buck MD    Date of Admission: 5/2/2021    Date of Service: Pt seen/examined on 5/2/2021 and is placed in observation. Chief Complaint:  had concerns including Shortness of Breath (sob x 1.5 hours. ). History Of Present Illness:    Mr. Betty Whiteside, a 45y.o. year old male  who  has a past medical history of Hypertension. Patient presents to the ED for evaluation of chest pain and shortness of breath. He states that he has been intermittently having chest pain and shortness of breath for a few months mostly at night if he eats dinner too late. Last night he ate and tried to go to sleep. He states he felt \"gas bubble\" in his epigastric region and then developed chest pain that radiated down his left arm and shortness of breath. The symptoms lasted longer than they usually do so he called EMS. His only reported medical history is tobacco abuse and hypertension for which he is prescribed lisinopril but admits he's not always compliant. He has a family history of early CAD of his father in his 52's. Immediately prior to my examination the patient had a reported episode in which he stood up and became bradycardic in the 40s. He had symptoms of lightheadedness, a feeling as if he needed to have a bowel movement, and diaphoresis. Now he is feeling better with a heart rate in the 60's. ER COURSE:  On arrival to ED patient was noted to be hypertensive-212/115. EKG was concerning for ischemic changes in V3 through V6. Troponin was elevated 0.53. Lactic acid was also elevated at 2.3. Chest x-ray concerning for pneumonia with left greater than right parenchymal infiltrates. He was given  nitroglycerin with complete resolution of his chest pain. Cardiology was consulted and he was started on a heparin drip with plans for Cath Lab this morning.     Past Medical History:        Diagnosis Date    Hypertension        Past Surgical History:    History reviewed. No pertinent surgical history. Medications Prior to Admission:      Prior to Admission medications    Medication Sig Start Date End Date Taking? Authorizing Provider   aspirin 81 MG chewable tablet Take 81 mg by mouth daily   Yes Historical Provider, MD   lisinopril (PRINIVIL;ZESTRIL) 40 MG tablet Take 1 tablet by mouth daily 3/2/20  Yes Anabela Mullen MD   amLODIPine (NORVASC) 10 MG tablet TAKE 1 TABLET BY MOUTH DAILY 4/27/20   Anabela Mullen MD   ibuprofen (ADVIL;MOTRIN) 200 MG tablet Take 200 mg by mouth every 6 hours as needed for Pain    Historical Provider, MD       Allergies:  Patient has no known allergies. Social History:    RESIDENCE: Private residence  TOBACCO:   reports that he has been smoking cigars. He has been smoking about 0.03 packs per day. He has never used smokeless tobacco.  ETOH:   reports no history of alcohol use. Family History:          Problem Relation Age of Onset    Diabetes Mother     Diabetes Father     High Blood Pressure Father     Stroke Father     Cancer Paternal Grandfather     No Known Problems Sister     High Blood Pressure Brother     No Known Problems Sister     No Known Problems Sister     No Known Problems Sister        REVIEW OF SYSTEMS:   Pertinent positives as noted in the HPI. All other systems reviewed and negative. PHYSICAL EXAM:  BP (!) 163/93   Pulse 50   Temp 97 °F (36.1 °C) (Temporal)   Resp 20   Ht 5' 11\" (1.803 m)   Wt 242 lb (109.8 kg)   SpO2 98%   BMI 33.75 kg/m²   General appearance: Middle aged male in no apparent distress, appears stated age and cooperative. HEENT: Normal cephalic, atraumatic without obvious deformity. Pupils equal, round, and reactive to light. Extra ocular muscles intact. Conjunctivae/corneas clear. Neck: Supple, with full range of motion. No jugular venous distention. Trachea midline. Respiratory:  Clear to auscultation bilaterally. No apparent distress.   Cardiovascular:  Regular rate and rhythm. S1, S2 without murmurs, rubs, or gallops. PV: Brisk capillary refill. +2 pedal and radial pulses bilaterally. No clubbing, cyanosis, edema of bilateral lower extremities. Abdomen: Soft, non-tender, non-distended. +BS  Musculoskeletal: No obvious deformities or erythematous or edematous joints. Skin: Normal skin color. No rashes or lesions. Neurologic:  Neurovascularly intact without any focal sensory/motor deficits. Cranial nerves: II-XII intact, grossly non-focal.  Psychiatric: Alert and oriented, thought content appropriate, normal insight    Reviewed EKG and CXR personally    CBC:   Recent Labs     05/02/21  0259 05/02/21  0429   WBC 7.3 8.0   RBC 4.92 4.79   HGB 16.0 15.6   HCT 48.1 47.2   MCV 97.8 98.5   RDW 12.9 12.9    301     BMP:   Recent Labs     05/02/21 0259      K 3.7      CO2 26   BUN 13   CREATININE 1.1     LFT:  Recent Labs     05/02/21 0259   PROT 7.0   ALKPHOS 62   ALT 17   AST 25   BILITOT 0.2   LIPASE 17     CE:  Recent Labs     05/02/21  0259 05/02/21  0655   CKTOTAL  --  455*   TROPONINI 0.53* 0.70*     PT/INR:   Recent Labs     05/02/21  0532 05/02/21  0655   INR  --  1.2   APTT >240.0* >240.0*     Lactic Acid:   Lab Results   Component Value Date    LACTA 2.3 (H) 05/02/2021     Oupatient labs:  Lab Results   Component Value Date    CHOL 230 (H) 05/02/2021    TRIG 80 05/02/2021    HDL 37 05/02/2021    LDLCALC 177 (H) 05/02/2021    INR 1.2 05/02/2021    LABA1C 5.7 (H) 05/02/2021       Urinalysis:    Lab Results   Component Value Date    NITRU POSITIVE 02/01/2019    WBCUA 10-20 02/01/2019    BACTERIA MANY 02/01/2019    RBCUA NONE 02/01/2019    BLOODU moderate 02/01/2019    BLOODU SMALL 02/01/2019    SPECGRAV 1.015 02/01/2019    SPECGRAV 1.020 02/01/2019    GLUCOSEU neg 02/01/2019    GLUCOSEU Negative 02/01/2019       Imaging:  Xr Chest Portable  Result Date: 5/2/2021  Left greater than right parenchymal infiltrates likely representing pneumonia.

## 2021-05-03 ENCOUNTER — APPOINTMENT (OUTPATIENT)
Dept: CT IMAGING | Age: 51
DRG: 233 | End: 2021-05-03

## 2021-05-03 ENCOUNTER — APPOINTMENT (OUTPATIENT)
Dept: ULTRASOUND IMAGING | Age: 51
DRG: 233 | End: 2021-05-03

## 2021-05-03 ENCOUNTER — APPOINTMENT (OUTPATIENT)
Dept: INTERVENTIONAL RADIOLOGY/VASCULAR | Age: 51
DRG: 233 | End: 2021-05-03

## 2021-05-03 LAB
ABO/RH: NORMAL
ANION GAP SERPL CALCULATED.3IONS-SCNC: 10 MMOL/L (ref 7–16)
ANTIBODY SCREEN: NORMAL
APTT: 62.1 SEC (ref 24.5–35.1)
BUN BLDV-MCNC: 10 MG/DL (ref 6–20)
CALCIUM SERPL-MCNC: 9.4 MG/DL (ref 8.6–10.2)
CHLORIDE BLD-SCNC: 105 MMOL/L (ref 98–107)
CK MB: 6.7 NG/ML (ref 0–7.7)
CO2: 24 MMOL/L (ref 22–29)
CREAT SERPL-MCNC: 1 MG/DL (ref 0.7–1.2)
EKG ATRIAL RATE: 66 BPM
EKG P AXIS: 61 DEGREES
EKG P-R INTERVAL: 158 MS
EKG Q-T INTERVAL: 420 MS
EKG QRS DURATION: 92 MS
EKG QTC CALCULATION (BAZETT): 440 MS
EKG R AXIS: 53 DEGREES
EKG T AXIS: -174 DEGREES
EKG VENTRICULAR RATE: 66 BPM
GFR AFRICAN AMERICAN: >60
GFR NON-AFRICAN AMERICAN: >60 ML/MIN/1.73
GLUCOSE BLD-MCNC: 112 MG/DL (ref 74–99)
HCT VFR BLD CALC: 45.1 % (ref 37–54)
HEMOGLOBIN: 15.4 G/DL (ref 12.5–16.5)
MCH RBC QN AUTO: 32.8 PG (ref 26–35)
MCHC RBC AUTO-ENTMCNC: 34.1 % (ref 32–34.5)
MCV RBC AUTO: 96 FL (ref 80–99.9)
PDW BLD-RTO: 12.6 FL (ref 11.5–15)
PLATELET # BLD: 334 E9/L (ref 130–450)
PMV BLD AUTO: 9.2 FL (ref 7–12)
POC ACT LR: 228 SECONDS
POTASSIUM REFLEX MAGNESIUM: 4 MMOL/L (ref 3.5–5)
RBC # BLD: 4.7 E12/L (ref 3.8–5.8)
SODIUM BLD-SCNC: 139 MMOL/L (ref 132–146)
T4 FREE: 1.21 NG/DL (ref 0.93–1.7)
TOTAL CK: 282 U/L (ref 20–200)
TROPONIN: 0.71 NG/ML (ref 0–0.03)
TSH SERPL DL<=0.05 MIU/L-ACNC: 0.49 UIU/ML (ref 0.27–4.2)
WBC # BLD: 7.8 E9/L (ref 4.5–11.5)

## 2021-05-03 PROCEDURE — 6360000002 HC RX W HCPCS: Performed by: FAMILY MEDICINE

## 2021-05-03 PROCEDURE — 6370000000 HC RX 637 (ALT 250 FOR IP): Performed by: INTERNAL MEDICINE

## 2021-05-03 PROCEDURE — 86901 BLOOD TYPING SEROLOGIC RH(D): CPT

## 2021-05-03 PROCEDURE — C1769 GUIDE WIRE: HCPCS

## 2021-05-03 PROCEDURE — 85730 THROMBOPLASTIN TIME PARTIAL: CPT

## 2021-05-03 PROCEDURE — 2580000003 HC RX 258: Performed by: INTERNAL MEDICINE

## 2021-05-03 PROCEDURE — 2500000003 HC RX 250 WO HCPCS

## 2021-05-03 PROCEDURE — 94375 RESPIRATORY FLOW VOLUME LOOP: CPT

## 2021-05-03 PROCEDURE — 6370000000 HC RX 637 (ALT 250 FOR IP): Performed by: FAMILY MEDICINE

## 2021-05-03 PROCEDURE — 85027 COMPLETE CBC AUTOMATED: CPT

## 2021-05-03 PROCEDURE — 94729 DIFFUSING CAPACITY: CPT

## 2021-05-03 PROCEDURE — 86900 BLOOD TYPING SEROLOGIC ABO: CPT

## 2021-05-03 PROCEDURE — 2060000000 HC ICU INTERMEDIATE R&B

## 2021-05-03 PROCEDURE — 93005 ELECTROCARDIOGRAM TRACING: CPT | Performed by: INTERNAL MEDICINE

## 2021-05-03 PROCEDURE — B2111ZZ FLUOROSCOPY OF MULTIPLE CORONARY ARTERIES USING LOW OSMOLAR CONTRAST: ICD-10-PCS | Performed by: INTERNAL MEDICINE

## 2021-05-03 PROCEDURE — B2151ZZ FLUOROSCOPY OF LEFT HEART USING LOW OSMOLAR CONTRAST: ICD-10-PCS | Performed by: INTERNAL MEDICINE

## 2021-05-03 PROCEDURE — C1725 CATH, TRANSLUMIN NON-LASER: HCPCS

## 2021-05-03 PROCEDURE — 6360000002 HC RX W HCPCS

## 2021-05-03 PROCEDURE — 93922 UPR/L XTREMITY ART 2 LEVELS: CPT

## 2021-05-03 PROCEDURE — 93923 UPR/LXTR ART STDY 3+ LVLS: CPT

## 2021-05-03 PROCEDURE — C1894 INTRO/SHEATH, NON-LASER: HCPCS

## 2021-05-03 PROCEDURE — 2709999900 HC NON-CHARGEABLE SUPPLY

## 2021-05-03 PROCEDURE — 85347 COAGULATION TIME ACTIVATED: CPT

## 2021-05-03 PROCEDURE — 86850 RBC ANTIBODY SCREEN: CPT

## 2021-05-03 PROCEDURE — 80048 BASIC METABOLIC PNL TOTAL CA: CPT

## 2021-05-03 PROCEDURE — 4A023N7 MEASUREMENT OF CARDIAC SAMPLING AND PRESSURE, LEFT HEART, PERCUTANEOUS APPROACH: ICD-10-PCS | Performed by: INTERNAL MEDICINE

## 2021-05-03 PROCEDURE — 84439 ASSAY OF FREE THYROXINE: CPT

## 2021-05-03 PROCEDURE — 84484 ASSAY OF TROPONIN QUANT: CPT

## 2021-05-03 PROCEDURE — 93010 ELECTROCARDIOGRAM REPORT: CPT | Performed by: INTERNAL MEDICINE

## 2021-05-03 PROCEDURE — 82550 ASSAY OF CK (CPK): CPT

## 2021-05-03 PROCEDURE — 93880 EXTRACRANIAL BILAT STUDY: CPT | Performed by: RADIOLOGY

## 2021-05-03 PROCEDURE — 99232 SBSQ HOSP IP/OBS MODERATE 35: CPT | Performed by: INTERNAL MEDICINE

## 2021-05-03 PROCEDURE — 84443 ASSAY THYROID STIM HORMONE: CPT

## 2021-05-03 PROCEDURE — 71250 CT THORAX DX C-: CPT

## 2021-05-03 PROCEDURE — 36415 COLL VENOUS BLD VENIPUNCTURE: CPT

## 2021-05-03 PROCEDURE — 93458 L HRT ARTERY/VENTRICLE ANGIO: CPT | Performed by: INTERNAL MEDICINE

## 2021-05-03 PROCEDURE — 93880 EXTRACRANIAL BILAT STUDY: CPT

## 2021-05-03 PROCEDURE — 82553 CREATINE MB FRACTION: CPT

## 2021-05-03 PROCEDURE — 99254 IP/OBS CNSLTJ NEW/EST MOD 60: CPT | Performed by: THORACIC SURGERY (CARDIOTHORACIC VASCULAR SURGERY)

## 2021-05-03 RX ORDER — SODIUM CHLORIDE 0.9 % (FLUSH) 0.9 %
5-40 SYRINGE (ML) INJECTION PRN
Status: DISCONTINUED | OUTPATIENT
Start: 2021-05-03 | End: 2021-05-10

## 2021-05-03 RX ORDER — HYDRALAZINE HYDROCHLORIDE 25 MG/1
25 TABLET, FILM COATED ORAL EVERY 6 HOURS PRN
Status: DISCONTINUED | OUTPATIENT
Start: 2021-05-03 | End: 2021-05-10

## 2021-05-03 RX ORDER — SODIUM CHLORIDE 9 MG/ML
INJECTION, SOLUTION INTRAVENOUS ONCE
Status: COMPLETED | OUTPATIENT
Start: 2021-05-03 | End: 2021-05-10

## 2021-05-03 RX ORDER — SODIUM CHLORIDE 9 MG/ML
500 INJECTION, SOLUTION INTRAVENOUS CONTINUOUS
Status: DISCONTINUED | OUTPATIENT
Start: 2021-05-03 | End: 2021-05-08

## 2021-05-03 RX ORDER — SODIUM CHLORIDE 0.9 % (FLUSH) 0.9 %
5-40 SYRINGE (ML) INJECTION EVERY 12 HOURS SCHEDULED
Status: DISCONTINUED | OUTPATIENT
Start: 2021-05-03 | End: 2021-05-10

## 2021-05-03 RX ORDER — SODIUM CHLORIDE 9 MG/ML
INJECTION, SOLUTION INTRAVENOUS ONCE
Status: COMPLETED | OUTPATIENT
Start: 2021-05-03 | End: 2021-05-03

## 2021-05-03 RX ORDER — RANOLAZINE 500 MG/1
500 TABLET, EXTENDED RELEASE ORAL 2 TIMES DAILY
Status: DISCONTINUED | OUTPATIENT
Start: 2021-05-03 | End: 2021-05-10

## 2021-05-03 RX ORDER — SODIUM CHLORIDE 9 MG/ML
25 INJECTION, SOLUTION INTRAVENOUS PRN
Status: DISCONTINUED | OUTPATIENT
Start: 2021-05-03 | End: 2021-05-10

## 2021-05-03 RX ADMIN — ACETAMINOPHEN 650 MG: 325 TABLET ORAL at 08:01

## 2021-05-03 RX ADMIN — LISINOPRIL 40 MG: 20 TABLET ORAL at 08:01

## 2021-05-03 RX ADMIN — ASPIRIN 81 MG CHEWABLE TABLET 81 MG: 81 TABLET CHEWABLE at 08:00

## 2021-05-03 RX ADMIN — AMLODIPINE BESYLATE 10 MG: 10 TABLET ORAL at 08:01

## 2021-05-03 RX ADMIN — Medication 10 ML: at 21:39

## 2021-05-03 RX ADMIN — RANOLAZINE 500 MG: 500 TABLET, FILM COATED, EXTENDED RELEASE ORAL at 21:39

## 2021-05-03 RX ADMIN — ATORVASTATIN CALCIUM 80 MG: 80 TABLET, FILM COATED ORAL at 21:39

## 2021-05-03 RX ADMIN — HEPARIN SODIUM 9.11 UNITS/KG/HR: 10000 INJECTION, SOLUTION INTRAVENOUS at 05:17

## 2021-05-03 RX ADMIN — HYDRALAZINE HYDROCHLORIDE 25 MG: 25 TABLET, FILM COATED ORAL at 18:01

## 2021-05-03 RX ADMIN — SODIUM CHLORIDE: 9 INJECTION, SOLUTION INTRAVENOUS at 08:53

## 2021-05-03 ASSESSMENT — PAIN SCALES - GENERAL
PAINLEVEL_OUTOF10: 2
PAINLEVEL_OUTOF10: 0

## 2021-05-03 NOTE — PROGRESS NOTES
Spoke with Krystian Barrios in regards to the patients pre-op tests, the patient still has the band from hrt cath still in place it will be best for his pre-op tests to be scheduled for tomorrow 5/4/2021 per Krystian Barrios

## 2021-05-03 NOTE — OP NOTE
Operative Note      Patient: Celeste Diop  YOB: 1970  MRN: [id number]    Date of Procedure: 5/3/21    Indication:  1. NSTEMI  2. AUC score: 9  3. AUC indication: 4    Procedure: Left Heart Catheterization, coronary angiography, left ventriculography    Anesthesia: Versed, Fentanyl  Time sedation was administered: 09:38. I was present in the room when sedation was administered. Procedure end time: 09:57  Time spent with face to face monitoring of moderate sedation: 32 minutes    LHC performed via right radial approach using a 6 F sheath. 2.5mg of diluted Verapamil and 200mcg of nitroglycerine administered through the sheath. Patient on IV heparin. Findings:  Left main: 50  stenosis  LAD:  %  stenosis  Circumflex: 70 %   stenosis  RCA: Dominant. 100 %  stenosis  LV angio: 40-45%  ejection fraction    Hemodynamics:  LV: 147 mmHg. EDP: 3 No gradient across AV. Ao: 140/77 ( 97 ). Sheath removed and TR band applied. There was good hemostasis achieved and the distal pulses were intact.      Complication: None   Estimated blood loss: 10 cc  Contrast use: 80    Post op diagnosis:  Severe multivessel CAD  Mild to moderate LV systolic dysfunction    PLAN:  CT surgery consult        Electronically signed by Aleta Candelario MD on 5/3/2021 at 10:10 AM

## 2021-05-03 NOTE — CONSULTS
CTS Consult    Patient name: Fili Palmer    Reason for consult:  MVCAD     Primary Care Physician: Genia Mcguire MD    Date of service: 5/3/2021    Chief Complaint:  Chest pain     HPI:  46 yo male who presented to the ED with complaints of chest pain accompanied with nausea. This is new onset for him and his wife called 911. He was noted to have NSTEMI. Ischemic workup included LHC which revealed severe MVCAD.       Allergies: No Known Allergies    Home medications:    Current Facility-Administered Medications   Medication Dose Route Frequency Provider Last Rate Last Admin    0.9 % sodium chloride infusion   Intravenous Once Sheri Marrufo MD        sodium chloride flush 0.9 % injection 5-40 mL  5-40 mL Intravenous 2 times per day Sheri Marrufo MD        sodium chloride flush 0.9 % injection 5-40 mL  5-40 mL Intravenous PRN Sheri Marrufo MD        0.9 % sodium chloride infusion  25 mL Intravenous PRN Sheri Marrufo MD        0.9 % sodium chloride infusion  500 mL Intravenous Continuous Sheri Marrufo MD        ranolazine [facility]) extended release tablet 500 mg  500 mg Oral BID Sheri Marrufo MD        nitroGLYCERIN (NITROSTAT) SL tablet 0.4 mg  0.4 mg Sublingual Once Sheri Marrufo MD        nitroGLYCERIN 50 mg in dextrose 5% 250 mL infusion  5-200 mcg/min Intravenous Continuous Sheri Marrufo MD 3 mL/hr at 05/02/21 1146 10 mcg/min at 05/02/21 1146    aspirin chewable tablet 81 mg  81 mg Oral Daily Sheri Marrufo MD   81 mg at 05/03/21 0800    lisinopril (PRINIVIL;ZESTRIL) tablet 40 mg  40 mg Oral Daily Sheri Marrufo MD   40 mg at 05/03/21 0801    promethazine (PHENERGAN) tablet 12.5 mg  12.5 mg Oral Q6H PRN Sheri Marrufo MD        Or    ondansetron [facility]) injection 4 mg  4 mg Intravenous Q6H PRN Sheri Marrufo MD        acetaminophen (TYLENOL) tablet 650 mg  650 mg Oral Q6H PRN Sheri Marrufo MD   650 mg at 05/03/21 0801    Or    acetaminophen (TYLENOL) suppository 650 mg  650 mg Rectal Q6H PRN Aleta Candelario MD        polyethylene glycol [facility]) packet 17 g  17 g Oral Daily PRN Aleta Candelario MD        atorvastatin (LIPITOR) tablet 80 mg  80 mg Oral Nightly Aleta Candelario MD   80 mg at 05/02/21 2034    perflutren lipid microspheres (DEFINITY) injection 1.65 mg  1.5 mL Intravenous ONCE PRN Aleta Candelario MD        heparin (porcine) injection 4,000 Units  4,000 Units Intravenous PRN Aleta Candelario MD        heparin (porcine) injection 2,000 Units  2,000 Units Intravenous PRN Aleta Candelario MD        heparin 25,000 units in dextrose 5% 250 mL (premix) infusion  5-30 Units/kg/hr Intravenous Continuous Aleta Candelario MD 10 mL/hr at 05/03/21 0517 9.107 Units/kg/hr at 05/03/21 0517    amLODIPine (NORVASC) tablet 10 mg  10 mg Oral Daily Aleta Candelario MD   10 mg at 05/03/21 0801       Past Medical History:  Past Medical History:   Diagnosis Date    CAD (coronary artery disease)     nstemi 5/2    Hyperlipidemia     Hypertension        Past Surgical History:  Past Surgical History:   Procedure Laterality Date    CARDIAC CATHETERIZATION  05/03/2021    Dr. Cheng Thompson       Social History:  Social History     Socioeconomic History    Marital status:      Spouse name: Not on file    Number of children: Not on file    Years of education: Not on file    Highest education level: Not on file   Occupational History    Not on file   Social Needs    Financial resource strain: Not on file    Food insecurity     Worry: Not on file     Inability: Not on file   [employer] needs     Medical: Not on file     Non-medical: Not on file   Tobacco Use    Smoking status: Current Every Day Smoker     Packs/day: 0.03     Types: Cigars    Smokeless tobacco: Never Used    Tobacco comment: 5 skinny cigars a day.    Substance and Sexual Activity    Alcohol use: No    Drug use: No    Sexual activity: Yes     Partners: Female   Lifestyle    Physical activity     Days per week: Not on file     Minutes per session: Not on file    Stress: Not on file   Relationships    Social connections     Talks on phone: Not on file     Gets together: Not on file     Attends Qu[employer] service: Not on file     Active member of club or organization: Not on file     Attends meetings of clubs or organizations: Not on file     Relationship status: Not on file    Intimate partner violence     Fear of current or ex partner: Not on file     Emotionally abused: Not on file     Physically abused: Not on file     Forced sexual activity: Not on file   Other Topics Concern    Not on file   Social History Narrative    Not on file       Family History:  Family History   Problem Relation Age of Onset    Diabetes Mother     Diabetes Father     High Blood Pressure Father     Stroke Father     Cancer Paternal Grandfather     No Known Problems Sister     High Blood Pressure Brother     No Known Problems Sister     No Known Problems Sister     No Known Problems Sister        Review of Systems:  Constitutional: Denies fevers, chills, or weight loss. HEENT: Denies visual changes or hearing loss. Heart: As per HPI. Lungs: Denies shortness of breath, cough, or wheezing. Gastrointestinal: Denies nausea, vomiting, constipation, or diarrhea. Genitourinary: dysuria or hematuria. Psychiatric: Patient denies anxiety or depression. Neurologic: Patient denies weakness of the extremities, dizziness, or headaches. All other ROS checked and found to be negative.     Labs:  Recent Labs     05/02/21  0259 05/02/21  0429 05/03/21  0602   WBC 7.3 8.0 7.8   HGB 16.0 15.6 15.4   HCT 48.1 47.2 45.1    301 334      Recent Labs     05/02/21  0259 05/03/21  0602   BUN 13 10   CREATININE 1.1 1.0       Objective:  Vitals BP (!) 142/88   Pulse 55   Temp 97.2 °F (36.2 °C) (Temporal)   Resp 16   Ht 5' 11\" (1.803 m)   Wt 218 lb 1 oz (98.9 kg)   SpO2 100%   BMI 30.41 kg/m²   General Appearance: Pleasant 45y.o. year old male who appears stated age.  Communicates well, no acute distress. HEENT: Head is normocephalic, atraumatic. EOMs intact, PERRL. Trachea midline. Lungs: Normal respiratory rate and normal effort. He is not in respiratory distress. Breath sounds clear to auscultation. No wheezes. Heart: Normal rate. Regular rhythm. S1 normal and S2 normal. Positive for murmur. Chest: Symmetric chest wall expansion. Extremities: Normal range of motion. Neurological: Patient is alert and oriented to person, place and time. Patient has normal reflexes. Skin: Warm and dry. Abdomen: Abdomen is soft and non-distended. Bowel sounds are normal. There is no abdominal tenderness tenderness. There is no guarding. There is no mass. Pulses: Distal pulses are intact. Skin: Warm and dry without lesions. Procedure: Left Heart Catheterization, coronary angiography, left ventriculography     Anesthesia: Versed, Fentanyl  Time sedation was administered: 09:38. I was present in the room when sedation was administered. Procedure end time: 09:57  Time spent with face to face monitoring of moderate sedation: 32 minutes     LHC performed via right radial approach using a 6 F sheath. 2.5mg of diluted Verapamil and 200mcg of nitroglycerine administered through the sheath. Patient on IV heparin.      Findings:  Left main: 50  stenosis  LAD:  %  stenosis  Circumflex: 70 %   stenosis  RCA: Dominant. 100 %  stenosis  LV angio: 40-45%  ejection fraction     Hemodynamics:  LV: 147 mmHg. EDP: 3 No gradient across AV. Ao: 140/77 ( 97 ).    Sheath removed and TR band applied. There was good hemostasis achieved and the distal pulses were intact.      Complication: None   Estimated blood loss: 10 cc  Contrast use: 85     Post op diagnosis:  Severe multivessel CAD  Mild to moderate LV systolic dysfunction     PLAN:  CT surgery consult    Assessment/Plan  44 yo male admitted with NSTEMI and possible pneumonia found to have severe MVCAD.   All risks, benefits, alternatives and potential complications explained thoroughly including, but not limited to, bleeding, infection, lung injury, kidney injury, stroke, heart attack, prolonged ventilation, wound complication, need for re-operation, and death, and the patient agrees to proceed. Preop workup, CABG after Brilinta washout.        Electronically signed by Rip Ross DO on 5/3/2021 at 10:50 AM

## 2021-05-03 NOTE — CARE COORDINATION
Attempted to meet with pt at bedside however, off unit. Chart reviewed, pt had cardiac cath today, severe MVD, CTS consulted for CABG, per CTS note will proceed once Brilinta washout is complete; PCP is Dr. Ale Ramirez, uses [facility] [address]; from home with spouse [facility]; on RA, anticipate discharge plan is to return home once medically stable however, SW/CM will continue to follow for discharge needs and verify information obtained from chart with pt.

## 2021-05-03 NOTE — PROCEDURES
[address]                  [id number] [facility]                            CARDIAC CATHETERIZATION    PATIENT NAME: Abdiel Servin                       :        1970  MED REC NO:   [id number]                            ROOM:       [id number]  ACCOUNT NO:   [de-identified]                           ADMIT DATE: 2021  PROVIDER:     Kiesha Ramirez MD    DATE OF PROCEDURE:  2021    PROCEDURE:  Left heart catheterization, selective coronary angiography  and left ventriculography. The procedure was done through right radial approach using ultrasound  guidance. The patient received intravenous Versed and intravenous fentanyl for  sedation. INDICATION:  Non-ST-elevation myocardial infarction. PRESSURES:  1. Aorta 140/77 with a mean of 97.  2.  Left ventricle systolic pressure 759, left ventricular end-diastolic  pressure 3.  3.  There was no gradient across the aortic valve. CORONARY ANGIOGRAPHY:  LEFT MAIN:  The left main artery has at least 50% distal vessel  narrowing. LAD:  The left anterior descending artery is functionally occluded with  99% stenosis with PAIGE-2 flow in the distal vessel after the origin. The site of subtotal occlusion is after the origin of the first marginal  branch. The first marginal branch itself had around 70% ostial  narrowing. LCX:  The left circumflex is a large nondominant vessel. It has a focal  70% to 80% stenosis before the first and second marginal branches. The  first marginal branch is a relatively small caliber vessel with around  50% proximal vessel narrowing. The second marginal branch also has  around 50% ostial, proximal and mid vessel narrowings. The AV groove  branch which terminates into a small lateral branch has also around 70%  disease in its proximal segment. RCA:  The right coronary artery has a 95% mid vessel stenosis and is  occluded at the level of the crux.   The distal RCA filled faintly from  right-to-right collaterals. The distal RCA also received left-to-right  collaterals. VENTRICULOGRAPHY:  The left ventricle appear dilated. There is akinesis  of the left ventricular apex. There is hypokinesis of the basal  inferior wall. The ejection fraction is estimated at 40%. The right radial arterial sheath was removed at the end of the procedure  and a TR band was applied with adequate hemostasis and with preservation  of pulse. The patient tolerated the procedure well and left the cardiac  catheterization laboratory in stable condition. CONCLUSIONS:  1. Severe multivessel coronary artery disease as described above. 2.  Mildly dilated left ventricle with regional wall motion abnormality  as described above with moderately impaired left ventricular systolic  function with an estimated ejection fraction of 40%.         Kamilah Friedman MD    D: 05/03/2021 10:19:40       T: 05/03/2021 10:22:52     [email or web address]  Job#: [id number]     Doc#: [id number]    CC:

## 2021-05-03 NOTE — PROGRESS NOTES
Inpatient Cardiology Progress note     PATIENT IS BEING FOLLOWED FOR: NSTEMI     Aziza Crouch is a 45year old  male who was seen in initial consultation with Dr. Dada Torres on 05/02/2021. SUBJECTIVE: No CP. No overnight issues  OBJECTIVE: No apparent distress     ROS:  Consist: Denies fevers, chills or night sweats  Heart: Denies chest pain, palpitations, lightheadedness, dizziness or syncope  Lungs: Denies SOB, cough, wheezing, orthopnea or PND  GI: Denies abdominal pain, vomiting or diarrhea    PHYSICAL EXAM:   BP (!) 156/84   Pulse 70   Temp 99.1 °F (37.3 °C) (Temporal)   Resp 18   Ht 5' 11\" (1.803 m)   Wt 218 lb 1 oz (98.9 kg)   SpO2 95%   BMI 30.41 kg/m²    CONST: Well developed, well nourished obese, middle aged male who appears of his stated age. Awake, alert and cooperative. No apparent distress  HEENT:   Head- Normocephalic, atraumatic   Eyes- Conjunctivae pink, anicteric  Throat- Oral mucosa pink and moist  Neck-  No stridor, trachea midline, no jugular venous distention. No carotid bruit  CHEST: Chest symmetrical and non-tender to palpation. No accessory muscle use or intercostal retractions  RESPIRATORY:  Lung sounds -diminished with rare rales  CARDIOVASCULAR:     Heart Inspection- shows no noted pulsations  Heart Palpation- no heaves or thrills; PMI is non-displaced   Heart Ausculation- Regular rate and rhythm, no murmur. No s3, s4 or rub   PV: No lower extremity edema. No varicosities. Pedal pulses palpable, no clubbing or cyanosis. Bruise / hematoma right arm lateral aspect at the le omega of the antecubital fossa   ABDOMEN: Soft, non-tender to light palpation. Bowel sounds present. No palpable masses no organomegaly; no abdominal bruit  MS: Good muscle strength and tone. No atrophy or abnormal movements. : Deferred  SKIN: Warm and dry no statis dermatitis or ulcers   NEURO / PSYCH: Oriented to person, place and time. Speech clear and appropriate. Follows all commands. Pleasant affect       Intake/Output Summary (Last 24 hours) at 5/3/2021 0818  Last data filed at 5/3/2021 0538  Gross per 24 hour   Intake 175.28 ml   Output 2650 ml   Net -2474.72 ml       Weight:   Wt Readings from Last 3 Encounters:   05/03/21 218 lb 1 oz (98.9 kg)   10/14/19 242 lb (109.8 kg)   03/01/19 238 lb (108 kg)     Current Inpatient Medications:   nitroGLYCERIN  0.4 mg Sublingual Once    aspirin  324 mg Oral Once    aspirin  81 mg Oral Daily    lisinopril  40 mg Oral Daily    sodium chloride flush  5-40 mL Intravenous 2 times per day    atorvastatin  80 mg Oral Nightly    amLODIPine  10 mg Oral Daily    ticagrelor  90 mg Oral BID       IV Infusions (if any):   nitroGLYCERIN 10 mcg/min (05/02/21 1146)    sodium chloride      heparin (PORCINE) Infusion 9.107 Units/kg/hr (05/03/21 0517)       DIAGNOSTIC/ LABORATORY DATA:  Labs:   CBC:   Recent Labs     05/02/21  0429 05/03/21  0602   WBC 8.0 7.8   HGB 15.6 15.4   HCT 47.2 45.1    334     BMP:   Recent Labs     05/02/21  0259 05/03/21  0602    139   K 3.7 4.0   CO2 26 24   BUN 13 10   CREATININE 1.1 1.0   LABGLOM >60 >60   CALCIUM 9.4 9.4   HgA1c:   Lab Results   Component Value Date    LABA1C 5.8 (H) 05/02/2021   PT/INR:   Recent Labs     05/02/21  0655   PROTIME 12.7*   INR 1.2     APTT:  Recent Labs     05/02/21  1622 05/03/21  0602   APTT 74.7* 62.1*     CARDIAC ENZYMES:  Recent Labs     05/02/21  0259 05/02/21  0655 05/02/21  1334   CKTOTAL  --  455*  --    CKMB  --  20.2*  --    TROPONINI 0.53* 0.70* 0.82*     FASTING LIPID PANEL:  Lab Results   Component Value Date    CHOL 230 05/02/2021    HDL 37 05/02/2021    LDLCALC 177 05/02/2021    TRIG 80 05/02/2021     LIVER PROFILE:  Recent Labs     05/02/21  0259   AST 25   ALT 17   LABALBU 4.2     02/25/2019 Echocardiogram (Dr. Heard Bachelor):   Left ventricular size is grossly normal.  Mild left ventricular concentric hypertrophy noted.   Ejection fraction is visually estimated at 60%.  Normal left ventricular diastolic filling pattern for age    05/05/2021 CXR:   Left greater than right parenchymal infiltrates likely representing pneumonia.  Asymmetric edema less likely. Continued follow-up recommended. 12 lead EKG 5/2/21: SR with left atrial enlargement, possible old inferior MI, J point elevation V1-V4 ( old ) and new T wave inversions V3 to V6    Telemetry: SR      ASSESSMENT:   1. NSTEMI (likely LAD distribution). No recurrence of chest pain since admission. 2. Hypertension, not adequately controlled. 3. Hypercholesterolemia (with low HDL). 4. Tobacco abuse. 5. Episodes of sinus bradycardia 5/2/21, resolved  6. Obesity. PLAN:  1. Monitor HR: Consider low dose beta-blocker therapy. 2. Rest of cardiac medications same for now  3. For cardiac catheterization ( AUC score = 9-4 ) +/- PCI today.    4. Further recommendations to follow    Electronically signed by Alessandra Aguirre MD on 5/3/2021 at 8:18 AM

## 2021-05-03 NOTE — PLAN OF CARE
Problem: Cardiac Output - Decreased:  Goal: Hemodynamic stability will improve  Description: Hemodynamic stability will improve  5/3/2021 0130 by Viviana Maurice RN  Outcome: Met This Shift  5/2/2021 1159 by Sailaja Ramírez RN  Outcome: Met This Shift     Problem: Falls - Risk of:  Goal: Will remain free from falls  Description: Will remain free from falls  5/3/2021 0130 by Viviana Maurice RN  Outcome: Met This Shift  5/2/2021 1159 by Sailaja Ramírez RN  Outcome: Met This Shift  Goal: Absence of physical injury  Description: Absence of physical injury  5/3/2021 0130 by Viviana Maurice RN  Outcome: Met This Shift  5/2/2021 1159 by Sailaja Ramírez RN  Outcome: Met This Shift     Problem: Skin Integrity:  Goal: Will show no infection signs and symptoms  Description: Will show no infection signs and symptoms  5/3/2021 0130 by Viviana Maurice RN  Outcome: Met This Shift  5/2/2021 1159 by Sailaja Ramírez RN  Outcome: Met This Shift  Goal: Absence of new skin breakdown  Description: Absence of new skin breakdown  5/3/2021 0130 by Viviana Maurice RN  Outcome: Met This Shift  5/2/2021 1159 by Sailaja Ramírez RN  Outcome: Met This Shift

## 2021-05-03 NOTE — PROGRESS NOTES
Messaged Dr. Mark Joseph to notify patients BP is maintaining in the 170-180s/70s and nitro drip is running at 10mcg/min.

## 2021-05-03 NOTE — PROGRESS NOTES
Hospitalist Progress Note      SYNOPSIS: Patient admitted on 2021 for shortness of breath and chest pain. 19-year-old male with past medical history significant for hypertension came to our ER on  with chief complaint of chest pain and shortness of breath. He also complained of symptomatic bradycardia with heart rate of 40s on standing with some lightheadedness. In the ER his troponins were elevated to 0.53 and lactic acid of 2.3. Chest x-ray was concerning for pneumonia with left greater than right parenchymal infiltrates. He was also in hypertensive urgency and was started on nitroglycerin with complete resolution of his chest pain and improvement in his blood pressure. Patient was also started on IV heparin for non-ST segment elevation MI.      SUBJECTIVE:    Patient seen and examined in his room. No major overnight events. Just came back from Cath Lab  Denies any chest pain, shortness of breath. Records reviewed. Stable overnight. No other overnight issues reported. Temp (24hrs), Av.4 °F (36.9 °C), Min:97.1 °F (36.2 °C), Max:99.4 °F (37.4 °C)    DIET: Diet NPO, After Midnight Exceptions are: Sips of Water with Meds  CODE: Full Code    Intake/Output Summary (Last 24 hours) at 5/3/2021 0853  Last data filed at 5/3/2021 0538  Gross per 24 hour   Intake 175.28 ml   Output 2650 ml   Net -2474.72 ml       OBJECTIVE:    BP (!) 182/87   Pulse 65   Temp 99 °F (37.2 °C) (Temporal)   Resp 18   Ht 5' 11\" (1.803 m)   Wt 218 lb 1 oz (98.9 kg)   SpO2 95%   BMI 30.41 kg/m²     General appearance: No apparent distress, appears stated age and cooperative. HEENT:  Conjunctivae/corneas clear. Neck: Supple. No jugular venous distention. Respiratory: Clear to auscultation bilaterally, normal respiratory effort  Cardiovascular: Regular rate rhythm, normal S1-S2  Abdomen: Soft, nontender, nondistended  Musculoskeletal: No clubbing, cyanosis, no bilateral lower extremity edema.  Brisk capillary refill.    Skin:  No rashes  on visible skin  Neurologic: awake, alert and following commands     ASSESSMENT & PLAN:    Non-ST segment elevation MI  Continue heparin and nitroglycerin drip  Cardiac cath 5/3: Showed severe multivessel coronary artery disease  Consulted cardiothoracic surgery  Further work-up and management as per CTS  Aspirin, statins    Symptomatic bradycardia  Monitor for now  Waiting for cardiology recommendations    Lactic acidosis  Improving with IV fluids    Hypertension  Presented with hypertensive urgency  No blood pressure well controlled with IV nitroglycerin  Continue to wean off IV drugs    Parenchymal infiltrate  Likely from fluid overload  Procalcitonin 0.08  Monitor off antibiotics    Tobacco abuse  Cessation education    Hyperlipidemia  Continue statins    Hypoglycemia  Hemoglobin A1c 5.8  Recommend dietary modifications and exercise    DVT prophylaxis  Subcu heparin      DISPOSITION:     Medications:  REVIEWED DAILY    Infusion Medications    sodium chloride      sodium chloride      nitroGLYCERIN 10 mcg/min (05/02/21 1146)    sodium chloride      heparin (PORCINE) Infusion 9.107 Units/kg/hr (05/03/21 0517)     Scheduled Medications    nitroGLYCERIN  0.4 mg Sublingual Once    aspirin  324 mg Oral Once    aspirin  81 mg Oral Daily    lisinopril  40 mg Oral Daily    sodium chloride flush  5-40 mL Intravenous 2 times per day    atorvastatin  80 mg Oral Nightly    amLODIPine  10 mg Oral Daily    ticagrelor  90 mg Oral BID     PRN Meds: sodium chloride flush, sodium chloride, promethazine **OR** ondansetron, acetaminophen **OR** acetaminophen, polyethylene glycol, perflutren lipid microspheres, heparin (porcine), heparin (porcine)    Labs:     Recent Labs     05/02/21 0259 05/02/21  0429 05/03/21  0602   WBC 7.3 8.0 7.8   HGB 16.0 15.6 15.4   HCT 48.1 47.2 45.1    301 334       Recent Labs     05/02/21  0259 05/03/21  0602    139   K 3.7 4.0    105   CO2 26 24   BUN 13 10   CREATININE 1.1 1.0   CALCIUM 9.4 9.4       Recent Labs     05/02/21  0259   PROT 7.0   ALKPHOS 62   ALT 17   AST 25   BILITOT 0.2   LIPASE 17       Recent Labs     05/02/21  0655   INR 1.2       Recent Labs     05/02/21  0259 05/02/21  0655 05/02/21  1334   CKTOTAL  --  455*  --    TROPONINI 0.53* 0.70* 0.82*       Chronic labs:    Lab Results   Component Value Date    CHOL 230 (H) 05/02/2021    TRIG 80 05/02/2021    HDL 37 05/02/2021    LDLCALC 177 (H) 05/02/2021    INR 1.2 05/02/2021    LABA1C 5.8 (H) 05/02/2021       Radiology: REVIEWED DAILY    +++++++++++++++++++++++++++++++++++++++++++++++++  Mario Dias 68, [place]  +++++++++++++++++++++++++++++++++++++++++++++++++  NOTE: This report was transcribed using voice recognition software. Every effort was made to ensure accuracy; however, inadvertent computerized transcription errors may be present.

## 2021-05-04 ENCOUNTER — APPOINTMENT (OUTPATIENT)
Dept: GENERAL RADIOLOGY | Age: 51
DRG: 233 | End: 2021-05-04

## 2021-05-04 LAB
ANION GAP SERPL CALCULATED.3IONS-SCNC: 10 MMOL/L (ref 7–16)
APTT: 70.4 SEC (ref 24.5–35.1)
BACTERIA: NORMAL /HPF
BILIRUBIN URINE: NEGATIVE
BLOOD, URINE: NEGATIVE
BUN BLDV-MCNC: 10 MG/DL (ref 6–20)
CALCIUM SERPL-MCNC: 9.7 MG/DL (ref 8.6–10.2)
CHLORIDE BLD-SCNC: 107 MMOL/L (ref 98–107)
CLARITY: CLEAR
CO2: 24 MMOL/L (ref 22–29)
COLOR: YELLOW
CREAT SERPL-MCNC: 1.1 MG/DL (ref 0.7–1.2)
EKG ATRIAL RATE: 59 BPM
EKG P AXIS: 54 DEGREES
EKG P-R INTERVAL: 162 MS
EKG Q-T INTERVAL: 406 MS
EKG QRS DURATION: 100 MS
EKG QTC CALCULATION (BAZETT): 401 MS
EKG R AXIS: 38 DEGREES
EKG T AXIS: -171 DEGREES
EKG VENTRICULAR RATE: 59 BPM
GFR AFRICAN AMERICAN: >60
GFR NON-AFRICAN AMERICAN: >60 ML/MIN/1.73
GLUCOSE BLD-MCNC: 106 MG/DL (ref 74–99)
GLUCOSE URINE: NEGATIVE MG/DL
KETONES, URINE: NEGATIVE MG/DL
LEUKOCYTE ESTERASE, URINE: ABNORMAL
NITRITE, URINE: NEGATIVE
PH UA: 5.5 (ref 5–9)
POTASSIUM REFLEX MAGNESIUM: 4.5 MMOL/L (ref 3.5–5)
PROTEIN UA: NEGATIVE MG/DL
RBC UA: NORMAL /HPF (ref 0–2)
SODIUM BLD-SCNC: 141 MMOL/L (ref 132–146)
SPECIFIC GRAVITY UA: 1.02 (ref 1–1.03)
UROBILINOGEN, URINE: 0.2 E.U./DL
WBC UA: NORMAL /HPF (ref 0–5)

## 2021-05-04 PROCEDURE — 6370000000 HC RX 637 (ALT 250 FOR IP): Performed by: INTERNAL MEDICINE

## 2021-05-04 PROCEDURE — 2580000003 HC RX 258: Performed by: INTERNAL MEDICINE

## 2021-05-04 PROCEDURE — 93005 ELECTROCARDIOGRAM TRACING: CPT | Performed by: INTERNAL MEDICINE

## 2021-05-04 PROCEDURE — 71046 X-RAY EXAM CHEST 2 VIEWS: CPT

## 2021-05-04 PROCEDURE — 36415 COLL VENOUS BLD VENIPUNCTURE: CPT

## 2021-05-04 PROCEDURE — 81001 URINALYSIS AUTO W/SCOPE: CPT

## 2021-05-04 PROCEDURE — 2060000000 HC ICU INTERMEDIATE R&B

## 2021-05-04 PROCEDURE — 99233 SBSQ HOSP IP/OBS HIGH 50: CPT | Performed by: INTERNAL MEDICINE

## 2021-05-04 PROCEDURE — 85730 THROMBOPLASTIN TIME PARTIAL: CPT

## 2021-05-04 PROCEDURE — 80048 BASIC METABOLIC PNL TOTAL CA: CPT

## 2021-05-04 PROCEDURE — 6360000002 HC RX W HCPCS: Performed by: INTERNAL MEDICINE

## 2021-05-04 PROCEDURE — 87088 URINE BACTERIA CULTURE: CPT

## 2021-05-04 PROCEDURE — 93010 ELECTROCARDIOGRAM REPORT: CPT | Performed by: INTERNAL MEDICINE

## 2021-05-04 RX ORDER — ISOSORBIDE MONONITRATE 30 MG/1
30 TABLET, EXTENDED RELEASE ORAL 2 TIMES DAILY
Status: DISCONTINUED | OUTPATIENT
Start: 2021-05-04 | End: 2021-05-10

## 2021-05-04 RX ADMIN — ISOSORBIDE MONONITRATE 30 MG: 30 TABLET ORAL at 20:16

## 2021-05-04 RX ADMIN — Medication 10 ML: at 08:17

## 2021-05-04 RX ADMIN — RANOLAZINE 500 MG: 500 TABLET, FILM COATED, EXTENDED RELEASE ORAL at 08:17

## 2021-05-04 RX ADMIN — RANOLAZINE 500 MG: 500 TABLET, FILM COATED, EXTENDED RELEASE ORAL at 20:17

## 2021-05-04 RX ADMIN — ASPIRIN 81 MG CHEWABLE TABLET 81 MG: 81 TABLET CHEWABLE at 08:17

## 2021-05-04 RX ADMIN — ATORVASTATIN CALCIUM 80 MG: 80 TABLET, FILM COATED ORAL at 20:17

## 2021-05-04 RX ADMIN — Medication 10 ML: at 20:17

## 2021-05-04 RX ADMIN — HEPARIN SODIUM 9.1 UNITS/KG/HR: 10000 INJECTION, SOLUTION INTRAVENOUS at 08:02

## 2021-05-04 RX ADMIN — LISINOPRIL 40 MG: 20 TABLET ORAL at 08:17

## 2021-05-04 RX ADMIN — AMLODIPINE BESYLATE 10 MG: 10 TABLET ORAL at 08:17

## 2021-05-04 ASSESSMENT — PAIN SCALES - GENERAL: PAINLEVEL_OUTOF10: 0

## 2021-05-04 NOTE — PROGRESS NOTES
Hospitalist Progress Note      SYNOPSIS: Patient admitted on 2021 for shortness of breath and chest pain. 29-year-old male with past medical history significant for hypertension came to our ER on  with chief complaint of chest pain and shortness of breath. He also complained of symptomatic bradycardia with heart rate of 40s on standing with some lightheadedness. In the ER his troponins were elevated to 0.53 and lactic acid of 2.3. Chest x-ray was concerning for pneumonia with left greater than right parenchymal infiltrates. He was also in hypertensive urgency and was started on nitroglycerin with complete resolution of his chest pain and improvement in his blood pressure. Patient was also started on IV heparin for non-ST segment elevation MI. Patient underwent cardiac cath on 5/3 and was found to have severe multivessel disease but called for cardiothoracic evaluation. Evaluated by cardiothoracic surgeon with a plan to undergo CABG during this admission. SUBJECTIVE:    Patient seen and examined in his room. No major overnight events. Denies any chest pain, shortness of breath. Records reviewed. Stable overnight. No other overnight issues reported. Temp (24hrs), Av.1 °F (36.7 °C), Min:97.2 °F (36.2 °C), Max:98.8 °F (37.1 °C)    DIET: DIET CARDIAC;  CODE: Full Code    Intake/Output Summary (Last 24 hours) at 2021 0918  Last data filed at 2021 0431  Gross per 24 hour   Intake 1042 ml   Output 800 ml   Net 242 ml       OBJECTIVE:    BP (!) 158/89   Pulse 74   Temp 97.4 °F (36.3 °C) (Temporal)   Resp 18   Ht 5' 11\" (1.803 m)   Wt 218 lb (98.9 kg)   SpO2 96%   BMI 30.40 kg/m²     General appearance: No apparent distress, appears stated age and cooperative. HEENT:  Conjunctivae/corneas clear. Neck: Supple. No jugular venous distention.    Respiratory: Clear to auscultation bilaterally, normal respiratory effort  Cardiovascular: Regular rate rhythm, normal S1-S2  Abdomen: Soft, nontender, nondistended  Musculoskeletal: No clubbing, cyanosis, no bilateral lower extremity edema. Brisk capillary refill. Skin:  No rashes  on visible skin  Neurologic: awake, alert and following commands     ASSESSMENT & PLAN:    Non-ST segment elevation MI  Continue heparin and nitroglycerin drip  Cardiac cath 5/3: Showed severe multivessel coronary artery disease  Further work-up and management as per CTS  plan for CABG during this admission  Aspirin, statins    Symptomatic bradycardia  Monitor for now  Improved    Lactic acidosis resolved    Hypertension  Presented with hypertensive urgency  No blood pressure well controlled with IV nitroglycerin  Continue to wean off IV drugs    Parenchymal infiltrate  Likely from fluid overload  Procalcitonin 0.08  Monitor off antibiotics    Tobacco abuse  Cessation education    Hyperlipidemia  Continue statins    Hypoglycemia  Hemoglobin A1c 5.8  Recommend dietary modifications and exercise    DVT prophylaxis  Subcu heparin    DISPOSITION:     After CABG.     Medications:  REVIEWED DAILY    Infusion Medications    sodium chloride      sodium chloride      sodium chloride      nitroGLYCERIN 10 mcg/min (05/02/21 1146)    heparin (PORCINE) Infusion 9.1 Units/kg/hr (05/04/21 0802)     Scheduled Medications    sodium chloride flush  5-40 mL Intravenous 2 times per day    ranolazine  500 mg Oral BID    nitroGLYCERIN  0.4 mg Sublingual Once    aspirin  81 mg Oral Daily    lisinopril  40 mg Oral Daily    atorvastatin  80 mg Oral Nightly    amLODIPine  10 mg Oral Daily     PRN Meds: sodium chloride flush, sodium chloride, hydrALAZINE, promethazine **OR** ondansetron, acetaminophen **OR** acetaminophen, polyethylene glycol, perflutren lipid microspheres, heparin (porcine), heparin (porcine)    Labs:     Recent Labs     05/02/21  0259 05/02/21  0429 05/03/21  0602   WBC 7.3 8.0 7.8   HGB 16.0 15.6 15.4   HCT 48.1 47.2 45.1    301 334 Recent Labs     05/02/21  0259 05/03/21  0602 05/04/21  0639    139 141   K 3.7 4.0 4.5    105 107   CO2 26 24 24   BUN 13 10 10   CREATININE 1.1 1.0 1.1   CALCIUM 9.4 9.4 9.7       Recent Labs     05/02/21  0259   PROT 7.0   ALKPHOS 62   ALT 17   AST 25   BILITOT 0.2   LIPASE 17       Recent Labs     05/02/21  0655   INR 1.2       Recent Labs     05/02/21  0655 05/02/21  1334 05/03/21  0652   CKTOTAL 455*  --  282*   TROPONINI 0.70* 0.82* 0.71*       Chronic labs:    Lab Results   Component Value Date    CHOL 230 (H) 05/02/2021    TRIG 80 05/02/2021    HDL 37 05/02/2021    LDLCALC 177 (H) 05/02/2021    TSH 0.489 05/03/2021    INR 1.2 05/02/2021    LABA1C 5.8 (H) 05/02/2021       Radiology: REVIEWED DAILY    +++++++++++++++++++++++++++++++++++++++++++++++++  LEN Dias/ Raúl Dacosta , [place]  +++++++++++++++++++++++++++++++++++++++++++++++++  NOTE: This report was transcribed using voice recognition software. Every effort was made to ensure accuracy; however, inadvertent computerized transcription errors may be present.

## 2021-05-04 NOTE — PLAN OF CARE
Problem: Falls - Risk of:  Goal: Will remain free from falls  Description: Will remain free from falls  5/4/2021 1459 by John Jama RN  Outcome: Met This Shift  5/4/2021 0250 by Abbey Sweet  Outcome: Met This Shift  Goal: Absence of physical injury  Description: Absence of physical injury  5/4/2021 1459 by John Jama RN  Outcome: Met This Shift  5/4/2021 0250 by Abbey Sweet  Outcome: Met This Shift     Problem: Skin Integrity:  Goal: Will show no infection signs and symptoms  Description: Will show no infection signs and symptoms  5/4/2021 1459 by John Jama RN  Outcome: Met This Shift  5/4/2021 0250 by Abbey Sweet  Outcome: Met This Shift  Goal: Absence of new skin breakdown  Description: Absence of new skin breakdown  5/4/2021 1459 by John Jama RN  Outcome: Met This Shift  5/4/2021 0250 by Abbey Sweet  Outcome: Met This Shift

## 2021-05-04 NOTE — PLAN OF CARE
Problem: Falls - Risk of:  Goal: Will remain free from falls  Description: Will remain free from falls  5/4/2021 0250 by Gina Clay  Outcome: Met This Shift  5/3/2021 1434 by Katelynn Chen RN  Outcome: Met This Shift  Goal: Absence of physical injury  Description: Absence of physical injury  5/4/2021 0250 by Gina Clay  Outcome: Met This Shift  5/3/2021 1434 by Katelynn Chen RN  Outcome: Met This Shift     Problem: Skin Integrity:  Goal: Will show no infection signs and symptoms  Description: Will show no infection signs and symptoms  5/4/2021 0250 by Gina Clay  Outcome: Met This Shift  5/3/2021 1434 by Katelynn Chen RN  Outcome: Met This Shift  Goal: Absence of new skin breakdown  Description: Absence of new skin breakdown  5/4/2021 0250 by Gina Clay  Outcome: Met This Shift  5/3/2021 1434 by Katelynn Chen RN  Outcome: Met This Shift
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
## 2021-05-04 NOTE — PROCEDURES
[address]                  [address] [facility],  [facility]                               PULMONARY FUNCTION    PATIENT NAME: Edgardo Leslie                       :        1970  MED REC NO:   [id number]                            ROOM:       7412  ACCOUNT NO:   [de-identified]                           ADMIT DATE: 2021  PROVIDER:     John Carrera DO    DATE OF PROCEDURE:  2021    INDICATIONS:  A 50-year-old  male, 71 inches, 218  pounds, NSTEMI. FINDINGS:  Spirometry reveals forced vital capacity of 3.81 liters, 88%  of predicted with an FEV1 of 3.12 liters, 91% of predicted with an  FEV1/FVC ratio 82%. Maximum voluntary ventilation is 119 liters per  minute, 77% of predicted. Static lung volumes with slow vital capacity of 3.82 liters, 88% of  predicted. Inspiratory capacity 3.22 liters, 92% of predicted. Expiratory reserve volume 0.60 liters, 72% of predicted with a diffusion  capacity of 23.87 mL/min per mmHg which is 70% of predicted. IMPRESSION:  Spirometry is normal with no definitive airflow obstruction  or loss in gas transfer noted. Clinical correlation needed.         Milady Hurley DO    D: 2021 20:28:48       T: 2021 20:31:59     [id number]  Job#: [id number]     Doc#: [id number]

## 2021-05-04 NOTE — PROGRESS NOTES
Inpatient Cardiology Progress note     PATIENT IS BEING FOLLOWED FOR: NSTEMI     León Borja is a 45year old  male who was seen in initial consultation with Dr. Scottie Vanegas on 05/02/2021. SUBJECTIVE:  No CP or SOB  OBJECTIVE: No apparent distress     ROS:  Consist: Denies fevers, chills or night sweats  Heart: Denies chest pain, palpitations, lightheadedness, dizziness or syncope  Lungs: Denies SOB, cough, wheezing, orthopnea or PND  GI: Denies abdominal pain, vomiting or diarrhea    PHYSICAL EXAM:   BP (!) 158/89   Pulse 74   Temp 97.4 °F (36.3 °C) (Temporal)   Resp 18   Ht 5' 11\" (1.803 m)   Wt 218 lb (98.9 kg)   SpO2 96%   BMI 30.40 kg/m²    CONST: Well developed, well nourished obese, middle aged male who appears of his stated age. Awake, alert and cooperative. No apparent distress  HEENT:   Head- Normocephalic, atraumatic   Eyes- Conjunctivae pink, anicteric  Throat- Oral mucosa pink and moist  Neck-  No stridor, trachea midline, no jugular venous distention. No carotid bruit  CHEST: Chest symmetrical and non-tender to palpation. No accessory muscle use or intercostal retractions  RESPIRATORY:  Lung sounds -diminished with rare rales  CARDIOVASCULAR:     Heart Inspection- shows no noted pulsations  Heart Palpation- no heaves or thrills; PMI is non-displaced   Heart Ausculation- Regular rate and rhythm, no murmur. No s3, s4 or rub   PV: No lower extremity edema. No varicosities. Pedal pulses palpable, no clubbing or cyanosis. Right radial access site without hematoma and with good pulse. Bruise / hematoma right arm lateral aspect at the level of the antecubital fossa noted prior to the cath procedure  ABDOMEN: Soft, non-tender to light palpation. Bowel sounds present. No palpable masses no organomegaly; no abdominal bruit  MS: Good muscle strength and tone. No atrophy or abnormal movements.    : Deferred  SKIN: Warm and dry no statis dermatitis or ulcers   NEURO / PSYCH: Oriented to person, place and time. Speech clear and appropriate. Follows all commands.  Pleasant affect       Intake/Output Summary (Last 24 hours) at 5/4/2021 0913  Last data filed at 5/4/2021 0431  Gross per 24 hour   Intake 1042 ml   Output 800 ml   Net 242 ml       Weight:   Wt Readings from Last 3 Encounters:   05/04/21 218 lb (98.9 kg)   10/14/19 242 lb (109.8 kg)   03/01/19 238 lb (108 kg)     Current Inpatient Medications:   sodium chloride flush  5-40 mL Intravenous 2 times per day    ranolazine  500 mg Oral BID    nitroGLYCERIN  0.4 mg Sublingual Once    aspirin  81 mg Oral Daily    lisinopril  40 mg Oral Daily    atorvastatin  80 mg Oral Nightly    amLODIPine  10 mg Oral Daily       IV Infusions (if any):   sodium chloride      sodium chloride      sodium chloride      nitroGLYCERIN 10 mcg/min (05/02/21 1146)    heparin (PORCINE) Infusion 9.1 Units/kg/hr (05/04/21 0802)       DIAGNOSTIC/ LABORATORY DATA:  Labs:   CBC:   Recent Labs     05/02/21  0429 05/03/21  0602   WBC 8.0 7.8   HGB 15.6 15.4   HCT 47.2 45.1    334     BMP:   Recent Labs     05/03/21  0602 05/04/21  0639    141   K 4.0 4.5   CO2 24 24   BUN 10 10   CREATININE 1.0 1.1   LABGLOM >60 >60   CALCIUM 9.4 9.7   HgA1c:   Lab Results   Component Value Date    LABA1C 5.8 (H) 05/02/2021   PT/INR:   Recent Labs     05/02/21  0655   PROTIME 12.7*   INR 1.2     APTT:  Recent Labs     05/03/21  0602 05/04/21  0639   APTT 62.1* 70.4*     CARDIAC ENZYMES:  Recent Labs     05/02/21  0655 05/02/21  1334 05/03/21  0652   CKTOTAL 455*  --  282*   CKMB 20.2*  --  6.7   TROPONINI 0.70* 0.82* 0.71*     FASTING LIPID PANEL:  Lab Results   Component Value Date    CHOL 230 05/02/2021    HDL 37 05/02/2021    LDLCALC 177 05/02/2021    TRIG 80 05/02/2021     LIVER PROFILE:  Recent Labs     05/02/21  0259   AST 25   ALT 17   LABALBU 4.2     02/25/2019 Echocardiogram (Dr. Judy Fenton):   Left ventricular size is grossly normal.  Mild left ventricular concentric

## 2021-05-05 LAB
ANION GAP SERPL CALCULATED.3IONS-SCNC: 9 MMOL/L (ref 7–16)
APTT: 65.3 SEC (ref 24.5–35.1)
BUN BLDV-MCNC: 15 MG/DL (ref 6–20)
CALCIUM SERPL-MCNC: 9.7 MG/DL (ref 8.6–10.2)
CHLORIDE BLD-SCNC: 105 MMOL/L (ref 98–107)
CO2: 27 MMOL/L (ref 22–29)
CREAT SERPL-MCNC: 1.1 MG/DL (ref 0.7–1.2)
GFR AFRICAN AMERICAN: >60
GFR NON-AFRICAN AMERICAN: >60 ML/MIN/1.73
GLUCOSE BLD-MCNC: 102 MG/DL (ref 74–99)
HCT VFR BLD CALC: 44.1 % (ref 37–54)
HEMOGLOBIN: 15.3 G/DL (ref 12.5–16.5)
LV EF: 48 %
LVEF MODALITY: NORMAL
MCH RBC QN AUTO: 33 PG (ref 26–35)
MCHC RBC AUTO-ENTMCNC: 34.7 % (ref 32–34.5)
MCV RBC AUTO: 95.2 FL (ref 80–99.9)
PDW BLD-RTO: 12.6 FL (ref 11.5–15)
PLATELET # BLD: 320 E9/L (ref 130–450)
PMV BLD AUTO: 8.9 FL (ref 7–12)
POTASSIUM REFLEX MAGNESIUM: 4.4 MMOL/L (ref 3.5–5)
POTASSIUM SERPL-SCNC: 4.4 MMOL/L (ref 3.5–5)
RBC # BLD: 4.63 E12/L (ref 3.8–5.8)
SODIUM BLD-SCNC: 141 MMOL/L (ref 132–146)
WBC # BLD: 5.8 E9/L (ref 4.5–11.5)

## 2021-05-05 PROCEDURE — 6370000000 HC RX 637 (ALT 250 FOR IP): Performed by: INTERNAL MEDICINE

## 2021-05-05 PROCEDURE — 99232 SBSQ HOSP IP/OBS MODERATE 35: CPT | Performed by: INTERNAL MEDICINE

## 2021-05-05 PROCEDURE — 2060000000 HC ICU INTERMEDIATE R&B

## 2021-05-05 PROCEDURE — 80048 BASIC METABOLIC PNL TOTAL CA: CPT

## 2021-05-05 PROCEDURE — 85730 THROMBOPLASTIN TIME PARTIAL: CPT

## 2021-05-05 PROCEDURE — 36415 COLL VENOUS BLD VENIPUNCTURE: CPT

## 2021-05-05 PROCEDURE — 6360000002 HC RX W HCPCS: Performed by: INTERNAL MEDICINE

## 2021-05-05 PROCEDURE — 85027 COMPLETE CBC AUTOMATED: CPT

## 2021-05-05 PROCEDURE — 93306 TTE W/DOPPLER COMPLETE: CPT

## 2021-05-05 PROCEDURE — 2580000003 HC RX 258: Performed by: INTERNAL MEDICINE

## 2021-05-05 RX ADMIN — ATORVASTATIN CALCIUM 80 MG: 80 TABLET, FILM COATED ORAL at 20:22

## 2021-05-05 RX ADMIN — LISINOPRIL 40 MG: 20 TABLET ORAL at 08:24

## 2021-05-05 RX ADMIN — ISOSORBIDE MONONITRATE 30 MG: 30 TABLET ORAL at 22:01

## 2021-05-05 RX ADMIN — Medication 10 ML: at 08:25

## 2021-05-05 RX ADMIN — RANOLAZINE 500 MG: 500 TABLET, FILM COATED, EXTENDED RELEASE ORAL at 08:29

## 2021-05-05 RX ADMIN — Medication 10 ML: at 20:23

## 2021-05-05 RX ADMIN — AMLODIPINE BESYLATE 10 MG: 10 TABLET ORAL at 08:24

## 2021-05-05 RX ADMIN — ASPIRIN 81 MG CHEWABLE TABLET 81 MG: 81 TABLET CHEWABLE at 08:24

## 2021-05-05 RX ADMIN — HEPARIN SODIUM 9.11 UNITS/KG/HR: 10000 INJECTION, SOLUTION INTRAVENOUS at 09:41

## 2021-05-05 RX ADMIN — ISOSORBIDE MONONITRATE 30 MG: 30 TABLET ORAL at 08:24

## 2021-05-05 RX ADMIN — RANOLAZINE 500 MG: 500 TABLET, FILM COATED, EXTENDED RELEASE ORAL at 20:22

## 2021-05-05 ASSESSMENT — PAIN SCALES - GENERAL
PAINLEVEL_OUTOF10: 0

## 2021-05-05 ASSESSMENT — PAIN SCALES - WONG BAKER: WONGBAKER_NUMERICALRESPONSE: 0

## 2021-05-05 NOTE — PROGRESS NOTES
Nutrition Education      Counseled patient and wife on heart healthy/cardiac diet. Provided educational handouts and sample meal plans. Pt admits to eating fast foods and canned foods. Wife states that he consumes [employer] drinks often. Wife also states that they eat on-the-go often. · Written educational materials provided. · Contact name and number provided. · Refer to Patient Education activity for more details.     Electronically signed by Mayela Clinton RD, LD on 5/5/21 at 2:18 PM EDT    Contact: [phone]

## 2021-05-05 NOTE — CARE COORDINATION
Met with pt and spouse at bedside and verified earlier information obtained from chart. Discharge plan remains home with spouse Bo Daly whom will provide transportation once medically stable. Chart reviewed, plan for CABG hopefully Monday, last day of Brilinta washout would be Friday. Pt and spouse did request dietary consult to assist with education on cardiac diet. I messaged attending with request via [employer], order obtained.

## 2021-05-05 NOTE — PROGRESS NOTES
normal S1-S2  Abdomen: Soft, nontender, nondistended  Musculoskeletal: No clubbing, cyanosis, no bilateral lower extremity edema. Brisk capillary refill. Skin:  No rashes  on visible skin  Neurologic: awake, alert and following commands     ASSESSMENT & PLAN:    Non-ST segment elevation MI  Continue heparin and nitroglycerin drip  Cardiac cath 5/3: Showed severe multivessel coronary artery disease  Further work-up and management as per CTS  plan for CABG during this admission, final date remains unclear  Aspirin, statins  Ranexa 500 mg p.o. twice daily    Symptomatic bradycardia  Monitor for now  Improved    Lactic acidosis resolved    Hypertension  Presented with hypertensive urgency  Currently off IV drugs  Blood pressure well controlled with Norvasc 10, Imdur 30 mg p.o. twice daily, lisinopril 40 mg p.o. daily    Parenchymal infiltrate  Likely from fluid overload  Procalcitonin 0.08  Monitor off antibiotics    Tobacco abuse  Cessation education    Hyperlipidemia  Continue statins    Hypoglycemia  Hemoglobin A1c 5.8  Recommend dietary modifications and exercise    DVT prophylaxis  Subcu heparin    DISPOSITION:     After CABG.     Medications:  REVIEWED DAILY    Infusion Medications    sodium chloride      sodium chloride      sodium chloride      heparin (PORCINE) Infusion 9.1 Units/kg/hr (05/04/21 1400)     Scheduled Medications    isosorbide mononitrate  30 mg Oral BID    sodium chloride flush  5-40 mL Intravenous 2 times per day    ranolazine  500 mg Oral BID    nitroGLYCERIN  0.4 mg Sublingual Once    aspirin  81 mg Oral Daily    lisinopril  40 mg Oral Daily    atorvastatin  80 mg Oral Nightly    amLODIPine  10 mg Oral Daily     PRN Meds: sodium chloride flush, sodium chloride, hydrALAZINE, promethazine **OR** ondansetron, acetaminophen **OR** acetaminophen, polyethylene glycol, perflutren lipid microspheres, heparin (porcine), heparin (porcine)    Labs:     Recent Labs     05/03/21  0602 05/05/21  0633   WBC 7.8 5.8   HGB 15.4 15.3   HCT 45.1 44.1    320       Recent Labs     05/03/21  0602 05/04/21  0639 05/05/21  0633    141 141   K 4.0 4.5 4.4  4.4    107 105   CO2 24 24 27   BUN 10 10 15   CREATININE 1.0 1.1 1.1   CALCIUM 9.4 9.7 9.7       No results for input(s): PROT, ALB, ALKPHOS, ALT, AST, BILITOT, AMYLASE, LIPASE in the last 72 hours. No results for input(s): INR in the last 72 hours. Recent Labs     05/02/21  1334 05/03/21  0652   CKTOTAL  --  282*   TROPONINI 0.82* 0.71*       Chronic labs:    Lab Results   Component Value Date    CHOL 230 (H) 05/02/2021    TRIG 80 05/02/2021    HDL 37 05/02/2021    LDLCALC 177 (H) 05/02/2021    TSH 0.489 05/03/2021    INR 1.2 05/02/2021    LABA1C 5.8 (H) 05/02/2021       Radiology: REVIEWED DAILY    +++++++++++++++++++++++++++++++++++++++++++++++++  Arun, [address], [place]  +++++++++++++++++++++++++++++++++++++++++++++++++  NOTE: This report was transcribed using voice recognition software. Every effort was made to ensure accuracy; however, inadvertent computerized transcription errors may be present.

## 2021-05-05 NOTE — PROGRESS NOTES
Oriented to person, place and time. Speech clear and appropriate. Follows all commands. Pleasant affect       Intake/Output Summary (Last 24 hours) at 5/5/2021 1005  Last data filed at 5/5/2021 0546  Gross per 24 hour   Intake 1213.41 ml   Output 1550 ml   Net -336.59 ml       Weight:   Wt Readings from Last 3 Encounters:   05/05/21 221 lb 9.6 oz (100.5 kg)   10/14/19 242 lb (109.8 kg)   03/01/19 238 lb (108 kg)     Current Inpatient Medications:   isosorbide mononitrate  30 mg Oral BID    sodium chloride flush  5-40 mL Intravenous 2 times per day    ranolazine  500 mg Oral BID    nitroGLYCERIN  0.4 mg Sublingual Once    aspirin  81 mg Oral Daily    lisinopril  40 mg Oral Daily    atorvastatin  80 mg Oral Nightly    amLODIPine  10 mg Oral Daily       IV Infusions (if any):   sodium chloride      sodium chloride      sodium chloride      heparin (PORCINE) Infusion 9.107 Units/kg/hr (05/05/21 0941)       DIAGNOSTIC/ LABORATORY DATA:  Labs:   CBC:   Recent Labs     05/03/21  0602 05/05/21  0633   WBC 7.8 5.8   HGB 15.4 15.3   HCT 45.1 44.1    320     BMP:   Recent Labs     05/04/21  0639 05/05/21  0633    141   K 4.5 4.4  4.4   CO2 24 27   BUN 10 15   CREATININE 1.1 1.1   LABGLOM >60 >60   CALCIUM 9.7 9.7   HgA1c:   Lab Results   Component Value Date    LABA1C 5.8 (H) 05/02/2021   APTT:  Recent Labs     05/04/21  0639 05/05/21  0633   APTT 70.4* 65.3*     CARDIAC ENZYMES:  Recent Labs     05/02/21  1334 05/03/21  0652   CKTOTAL  --  282*   CKMB  --  6.7   TROPONINI 0.82* 0.71*     FASTING LIPID PANEL:  Lab Results   Component Value Date    CHOL 230 05/02/2021    HDL 37 05/02/2021    LDLCALC 177 05/02/2021    TRIG 80 05/02/2021 02/25/2019 Echocardiogram (Dr. Flakita Pat):   Left ventricular size is grossly normal.  Mild left ventricular concentric hypertrophy noted. Ejection fraction is visually estimated at 60%.   Normal left ventricular diastolic filling pattern for age    05/02/2021 CXR:

## 2021-05-05 NOTE — PROGRESS NOTES
CC:CP    Brief HPI:  Patient seen with Dr. Alexi Gandhi . Awake, alert. No complaints. No CP last 24h      Past Medical History:   Diagnosis Date    CAD (coronary artery disease)     nstemi 5/2    Hyperlipidemia     Hypertension      Past Surgical History:   Procedure Laterality Date    CARDIAC CATHETERIZATION  05/03/2021    Dr. Brigid Mccarty History     Socioeconomic History    Marital status:      Spouse name: Not on file    Number of children: Not on file    Years of education: Not on file    Highest education level: Not on file   Occupational History    Not on file   Social Needs    Financial resource strain: Not on file    Food insecurity     Worry: Not on file     Inability: Not on file   [employer] needs     Medical: Not on file     Non-medical: Not on file   Tobacco Use    Smoking status: Current Every Day Smoker     Packs/day: 0.03     Types: Cigars    Smokeless tobacco: Never Used    Tobacco comment: 5 skinny cigars a day.    Substance and Sexual Activity    Alcohol use: No    Drug use: No    Sexual activity: Yes     Partners: Female   Lifestyle    Physical activity     Days per week: Not on file     Minutes per session: Not on file    Stress: Not on file   Relationships    Social connections     Talks on phone: Not on file     Gets together: Not on file     Attends [employer] service: Not on file     Active member of club or organization: Not on file     Attends meetings of clubs or organizations: Not on file     Relationship status: Not on file    Intimate partner violence     Fear of current or ex partner: Not on file     Emotionally abused: Not on file     Physically abused: Not on file     Forced sexual activity: Not on file   Other Topics Concern    Not on file   Social History Narrative    Not on file     Family History   Problem Relation Age of Onset    Diabetes Mother     Diabetes Father     High Blood Pressure Father     Stroke Father     Cancer Paternal Grandfather     No Known Problems Sister     High Blood Pressure Brother     No Known Problems Sister     No Known Problems Sister     No Known Problems Sister        Vitals:    05/04/21 2043 05/05/21 0015 05/05/21 0508 05/05/21 0815   BP: 138/71 (!) 140/68  (!) 142/86   Pulse:  72  61   Resp:  18  16   Temp:  97.8 °F (36.6 °C)  97.7 °F (36.5 °C)   TempSrc:  Temporal  Temporal   SpO2:  94%  95%   Weight:   221 lb 9.6 oz (100.5 kg)    Height:               Intake/Output Summary (Last 24 hours) at 5/5/2021 0943  Last data filed at 5/5/2021 0546  Gross per 24 hour   Intake 1213.41 ml   Output 1550 ml   Net -336.59 ml         Recent Labs     05/03/21  0602 05/05/21  0633   WBC 7.8 5.8   HGB 15.4 15.3   HCT 45.1 44.1    320      Recent Labs     05/03/21  0602 05/04/21  0639 05/05/21  0633   BUN 10 10 15   CREATININE 1.0 1.1 1.1         ROS:   Negative for CP, palpitations, SOB at rest, dizziness/lightheadedness. Physical Exam   Constitutional: Oriented to person, place, and time. Appears well-developed. No distress. Cardiovascular: Normal rate, regular rhythm and normal heart sounds. Pulmonary/Chest: Effort normal. No respiratory distress. Abdominal: Soft. Bowel sounds are normal.   Musculoskeletal: Normal range of motion. Neurological: alert and oriented to person, place, and time. Skin: Skin is warm and dry. Psychiatric: normal mood and affect.          A/P: NSTEMI, MVCAD  - on heparin gtt, CP free at present  - awaiting brilinta washout so surgery wont be for several days- looking into next week  - cont supportive care  - on ACE- will need held DOS          Pre-operative testing review:   --carotids with no significant stenosis  --ramona with:  Decreased ankle brachial index of 0.81 on the right and 0.59 on the   left, with adequate collateral flow to both feet based upon the pulse   volume recordings over the metatarsals, with Doppler evidence of left   iliac and bilateral femoral popliteal arterial occlusive disease   --ct chest reviewed  --TTE ordered but not completed yet  --pft with FEV1 91 percent of predicted and DLCO 70 percent of predicted  --hgA1c 5.8        Note: 25 minutes was spent providing face-to-face patient care, including:  and coordinating care, reviewing the chart, labs, and diagnostics, as well as medical decision making. Greater than 50% of this time was spent instructing and counseling the patient face to face regarding findings and recommendations.

## 2021-05-06 LAB
ALBUMIN SERPL-MCNC: 4 G/DL (ref 3.5–5.2)
ALP BLD-CCNC: 62 U/L (ref 40–129)
ALT SERPL-CCNC: 20 U/L (ref 0–40)
ANION GAP SERPL CALCULATED.3IONS-SCNC: 12 MMOL/L (ref 7–16)
APTT: 55.2 SEC (ref 24.5–35.1)
APTT: 66.3 SEC (ref 24.5–35.1)
APTT: 88.8 SEC (ref 24.5–35.1)
AST SERPL-CCNC: 20 U/L (ref 0–39)
BILIRUB SERPL-MCNC: 0.4 MG/DL (ref 0–1.2)
BUN BLDV-MCNC: 17 MG/DL (ref 6–20)
CALCIUM SERPL-MCNC: 9.8 MG/DL (ref 8.6–10.2)
CHLORIDE BLD-SCNC: 107 MMOL/L (ref 98–107)
CO2: 22 MMOL/L (ref 22–29)
CREAT SERPL-MCNC: 1 MG/DL (ref 0.7–1.2)
GFR AFRICAN AMERICAN: >60
GFR NON-AFRICAN AMERICAN: >60 ML/MIN/1.73
GLUCOSE BLD-MCNC: 100 MG/DL (ref 74–99)
HCT VFR BLD CALC: 46.7 % (ref 37–54)
HEMOGLOBIN: 15.8 G/DL (ref 12.5–16.5)
MCH RBC QN AUTO: 32.7 PG (ref 26–35)
MCHC RBC AUTO-ENTMCNC: 33.8 % (ref 32–34.5)
MCV RBC AUTO: 96.7 FL (ref 80–99.9)
PDW BLD-RTO: 12.5 FL (ref 11.5–15)
PLATELET # BLD: 252 E9/L (ref 130–450)
PMV BLD AUTO: 10.3 FL (ref 7–12)
POTASSIUM REFLEX MAGNESIUM: 4.6 MMOL/L (ref 3.5–5)
POTASSIUM SERPL-SCNC: 4.6 MMOL/L (ref 3.5–5)
RBC # BLD: 4.83 E12/L (ref 3.8–5.8)
SODIUM BLD-SCNC: 141 MMOL/L (ref 132–146)
TOTAL PROTEIN: 6.9 G/DL (ref 6.4–8.3)
URINE CULTURE, ROUTINE: NORMAL
WBC # BLD: 5.6 E9/L (ref 4.5–11.5)

## 2021-05-06 PROCEDURE — 6370000000 HC RX 637 (ALT 250 FOR IP): Performed by: INTERNAL MEDICINE

## 2021-05-06 PROCEDURE — 85027 COMPLETE CBC AUTOMATED: CPT

## 2021-05-06 PROCEDURE — 2060000000 HC ICU INTERMEDIATE R&B

## 2021-05-06 PROCEDURE — 36415 COLL VENOUS BLD VENIPUNCTURE: CPT

## 2021-05-06 PROCEDURE — 6360000002 HC RX W HCPCS: Performed by: INTERNAL MEDICINE

## 2021-05-06 PROCEDURE — 80048 BASIC METABOLIC PNL TOTAL CA: CPT

## 2021-05-06 PROCEDURE — 99232 SBSQ HOSP IP/OBS MODERATE 35: CPT | Performed by: INTERNAL MEDICINE

## 2021-05-06 PROCEDURE — 80053 COMPREHEN METABOLIC PANEL: CPT

## 2021-05-06 PROCEDURE — 85730 THROMBOPLASTIN TIME PARTIAL: CPT

## 2021-05-06 PROCEDURE — 2580000003 HC RX 258: Performed by: INTERNAL MEDICINE

## 2021-05-06 RX ADMIN — ASPIRIN 81 MG CHEWABLE TABLET 81 MG: 81 TABLET CHEWABLE at 09:37

## 2021-05-06 RX ADMIN — ATORVASTATIN CALCIUM 80 MG: 80 TABLET, FILM COATED ORAL at 20:53

## 2021-05-06 RX ADMIN — ISOSORBIDE MONONITRATE 30 MG: 30 TABLET ORAL at 09:40

## 2021-05-06 RX ADMIN — LISINOPRIL 40 MG: 20 TABLET ORAL at 09:38

## 2021-05-06 RX ADMIN — RANOLAZINE 500 MG: 500 TABLET, FILM COATED, EXTENDED RELEASE ORAL at 20:53

## 2021-05-06 RX ADMIN — RANOLAZINE 500 MG: 500 TABLET, FILM COATED, EXTENDED RELEASE ORAL at 09:38

## 2021-05-06 RX ADMIN — AMLODIPINE BESYLATE 10 MG: 10 TABLET ORAL at 09:38

## 2021-05-06 RX ADMIN — ISOSORBIDE MONONITRATE 30 MG: 30 TABLET ORAL at 20:53

## 2021-05-06 RX ADMIN — Medication 10 ML: at 20:53

## 2021-05-06 RX ADMIN — HEPARIN SODIUM 7.1 UNITS/KG/HR: 10000 INJECTION, SOLUTION INTRAVENOUS at 11:44

## 2021-05-06 ASSESSMENT — PAIN SCALES - GENERAL
PAINLEVEL_OUTOF10: 0
PAINLEVEL_OUTOF10: 0

## 2021-05-06 NOTE — PROGRESS NOTES
Inpatient Cardiology Progress note     PATIENT IS BEING FOLLOWED FOR: NSTEMI     Danny Thomson is a 45year old  male who was seen in initial consultation with Dr. Heidi Clark on 05/02/2021. SUBJECTIVE:  No CP or SOB  OBJECTIVE: No apparent distress     ROS:  Consist: Denies fevers, chills or night sweats  Heart: Denies chest pain, palpitations, lightheadedness, dizziness or syncope  Lungs: Denies SOB, cough, wheezing, orthopnea or PND  GI: Denies abdominal pain, vomiting or diarrhea    PHYSICAL EXAM:   /62   Pulse 58   Temp 97.8 °F (36.6 °C) (Temporal)   Resp 17   Ht 5' 11\" (1.803 m)   Wt 221 lb 9.6 oz (100.5 kg)   SpO2 97%   BMI 30.91 kg/m²    CONST: Well developed, well nourished obese, middle aged male who appears of his stated age. Awake, alert and cooperative. No apparent distress  HEENT:   Head- Normocephalic, atraumatic   Eyes- Conjunctivae pink, anicteric  Throat- Oral mucosa pink and moist  Neck-  No stridor, trachea midline, no jugular venous distention. No carotid bruit  CHEST: Chest symmetrical and non-tender to palpation. No accessory muscle use or intercostal retractions  RESPIRATORY:  Lung sounds -diminished with rare rales  CARDIOVASCULAR:     Heart Inspection- shows no noted pulsations  Heart Palpation- no heaves or thrills; PMI is non-displaced   Heart Ausculation- Regular rate and rhythm, no murmur. No s3, s4 or rub   PV: No lower extremity edema. No varicosities. Pedal pulses palpable, no clubbing or cyanosis. Right radial access site without hematoma and with good pulse. Bruise / hematoma right arm lateral aspect at the level of the antecubital fossa noted prior to the cath procedure  ABDOMEN: Soft, non-tender to light palpation. Bowel sounds present. No palpable masses no organomegaly; no abdominal bruit  MS: Good muscle strength and tone. No atrophy or abnormal movements.    : Deferred  SKIN: Warm and dry no statis dermatitis or ulcers   NEURO / PSYCH: Oriented to person, place and time. Speech clear and appropriate. Follows all commands. Pleasant affect       Intake/Output Summary (Last 24 hours) at 5/6/2021 1049  Last data filed at 5/6/2021 0830  Gross per 24 hour   Intake 668.17 ml   Output 575 ml   Net 93.17 ml       Weight:   Wt Readings from Last 3 Encounters:   05/05/21 221 lb 9.6 oz (100.5 kg)   10/14/19 242 lb (109.8 kg)   03/01/19 238 lb (108 kg)     Current Inpatient Medications:   isosorbide mononitrate  30 mg Oral BID    sodium chloride flush  5-40 mL Intravenous 2 times per day    ranolazine  500 mg Oral BID    nitroGLYCERIN  0.4 mg Sublingual Once    aspirin  81 mg Oral Daily    lisinopril  40 mg Oral Daily    atorvastatin  80 mg Oral Nightly    amLODIPine  10 mg Oral Daily       IV Infusions (if any):   sodium chloride      sodium chloride      sodium chloride      heparin (PORCINE) Infusion 7.1 Units/kg/hr (05/06/21 1004)       DIAGNOSTIC/ LABORATORY DATA:  Labs:   CBC:   Recent Labs     05/05/21  0633 05/06/21  0633   WBC 5.8 5.6   HGB 15.3 15.8   HCT 44.1 46.7    252     BMP:   Recent Labs     05/05/21  0633 05/06/21  0633    141   K 4.4  4.4 4.6  4.6   CO2 27 22   BUN 15 17   CREATININE 1.1 1.0   LABGLOM >60 >60   CALCIUM 9.7 9.8   HgA1c:   Lab Results   Component Value Date    LABA1C 5.8 (H) 05/02/2021   APTT:  Recent Labs     05/06/21  0633 05/06/21  0922   APTT 66.3* 88.8*     FASTING LIPID PANEL:  Lab Results   Component Value Date    CHOL 230 05/02/2021    HDL 37 05/02/2021    LDLCALC 177 05/02/2021    TRIG 80 05/02/2021 02/25/2019 Echocardiogram (Dr. Ivana Lala):   Left ventricular size is grossly normal.  Mild left ventricular concentric hypertrophy noted. Ejection fraction is visually estimated at 60%. Normal left ventricular diastolic filling pattern for age    05/02/2021 CXR:   Left greater than right parenchymal infiltrates likely representing pneumonia.  Asymmetric edema less likely.  Continued follow-up recommended. 05/03/2021 CT Chest without contrast:   Right greater than left basal opacities favored to represent atelectasis. No significant acute cardiopulmonary process otherwise. 05/03/2021 Bilateral Carotid Ultrasound: Atherosclerotic disease. No hemodynamically significant stenosis is identified  Estimated stenosis by NASCET criteria in the proximal right carotid artery is between 0% and 49%. Estimated stenosis by NASCET criteria in the proximal left carotid artery is between 0% and 49%. 05/03/2021 Cardiac Catheterization (Dr. Renata Lee):   Findings:  Left main: 50  stenosis  LAD:  %  stenosis  Circumflex: 70 %   stenosis  RCA: Dominant. 100 %  stenosis  LV angio: 40-45%  ejection fraction  Hemodynamics:  LV: 147 mmHg. EDP: 3 No gradient across AV. Ao: 140/77 ( 97 ). 05/04/2021 CXR:   No acute process     05/05/2021 Echocardiogram (Dr. Renata Lee):   Left ventricle is normal in size. Moderate to severe concentric left ventricular hypertrophy. There is apical wall thinning an akinesis  Ejection fraction is visually estimated at 45-50%. There is doppler evidence of stage I diastolic dysfunction. Normal right ventricular size and function. Normal sized left atrium. No valvular abnormalities. 12 lead EKG 5/2/21: SR with left atrial enlargement, possible old inferior MI, J point elevation V1-V4 (old) and new T wave inversions V3 to V6    Telemetry: SR/SB       ASSESSMENT:   1. NSTEMI s/p cardiac catheterization 05/03/2021 with severe multivessel CAD; CT Surgery on case with plans for CABG after Brilinta washout. No recurrence of chest pain since admission. EF 45-50% per echo this admission   2. Hypertension, improving with change in medications; Moderate to severe LVH per echo this admission  3. Hypercholesterolemia (with low HDL), on statin    4. Tobacco abuse. 5. Sinus bradycardia, reason why not on BB therapy   6. Obesity. PLAN:  1. Continue current cardiac medications  2.  CABG timing per CT surgery, per charge nurse tentatively early next week  3. Cardiology will see prn.  Please call if needed    Electronically signed by Usha Levin MD on 5/6/2021 at 10:49 AM

## 2021-05-06 NOTE — PROGRESS NOTES
Hospitalist Progress Note      SYNOPSIS: Patient admitted on 2021 for shortness of breath and chest pain. 69-year-old male with past medical history significant for hypertension came to our ER on  with chief complaint of chest pain and shortness of breath. He also complained of symptomatic bradycardia with heart rate of 40s on standing with some lightheadedness. In the ER his troponins were elevated to 0.53 and lactic acid of 2.3. Chest x-ray was concerning for pneumonia with left greater than right parenchymal infiltrates. He was also in hypertensive urgency and was started on nitroglycerin with complete resolution of his chest pain and improvement in his blood pressure. Patient was also started on IV heparin for non-ST segment elevation MI. Patient underwent cardiac cath on 5/3 and was found to have severe multivessel disease but called for cardiothoracic evaluation. Evaluated by cardiothoracic surgeon with a plan to undergo CABG during this admission. SUBJECTIVE:    Patient seen and examined in his room. No further chest pain  No shortness of breath          Stable overnight. No other overnight issues reported. Temp (24hrs), Av.9 °F (36.6 °C), Min:97.4 °F (36.3 °C), Max:98.1 °F (36.7 °C)    DIET: DIET CARDIAC;  CODE: Full Code    Intake/Output Summary (Last 24 hours) at 2021 1020  Last data filed at 2021 0830  Gross per 24 hour   Intake 668.17 ml   Output 575 ml   Net 93.17 ml       OBJECTIVE:    /62   Pulse 58   Temp 97.8 °F (36.6 °C) (Temporal)   Resp 17   Ht 5' 11\" (1.803 m)   Wt 221 lb 9.6 oz (100.5 kg)   SpO2 97%   BMI 30.91 kg/m²     General appearance: No apparent distress, appears stated age and cooperative. HEENT:  Conjunctivae/corneas clear. Neck: Supple. No jugular venous distention.    Respiratory: Clear to auscultation bilaterally, normal respiratory effort  Cardiovascular: Regular rate rhythm, normal S1-S2  Abdomen: Soft, nontender,

## 2021-05-06 NOTE — PROGRESS NOTES
CC:CP     Brief HPI:  Patient seen and discussed with Dr Danni Caromna  Awake, alert. No complaints. Past Medical History:   Diagnosis Date    CAD (coronary artery disease)     nstemi 5/2    Hyperlipidemia     Hypertension      Past Surgical History:   Procedure Laterality Date    CARDIAC CATHETERIZATION  05/03/2021    Dr. Nancy Wiggins History    Marital status:      Spouse name: Not on file    Number of children: Not on file    Years of education: Not on file    Highest education level: Not on file   Occupational History    Not on file   Social Needs    Financial resource strain: Not on file    Food insecurity     Worry: Not on file     Inability: Not on file   [employer] needs     Medical: Not on file     Non-medical: Not on file   Tobacco Use    Smoking status: Current Every Day Smoker     Packs/day: 0.03     Types: Cigars    Smokeless tobacco: Never Used    Tobacco comment: 5 skinny cigars a day.    Substance and Sexual Activity    Alcohol use: No    Drug use: No    Sexual activity: Yes     Partners: Female   Lifestyle    Physical activity     Days per week: Not on file     Minutes per session: Not on file    Stress: Not on file   Relationships    Social connections     Talks on phone: Not on file     Gets together: Not on file     Attends Taoism service: Not on file     Active member of club or organization: Not on file     Attends meetings of clubs or organizations: Not on file     Relationship status: Not on file    Intimate partner violence     Fear of current or ex partner: Not on file     Emotionally abused: Not on file     Physically abused: Not on file     Forced sexual activity: Not on file   Other Topics Concern    Not on file   Social History Narrative    Not on file     Family History   Problem Relation Age of Onset    Diabetes Mother     Diabetes Father     High Blood Pressure Father     Stroke Father     Cancer Paternal Grandfather     No Known Problems Sister     High Blood Pressure Brother     No Known Problems Sister     No Known Problems Sister     No Known Problems Sister        Vitals:    05/05/21 1949 05/06/21 0000 05/06/21 0400 05/06/21 0830   BP: (!) 142/81 (!) 140/80 (!) 140/78 135/62   Pulse: 64 66 64 58   Resp: 16 18 16 17   Temp: 98.1 °F (36.7 °C) 98 °F (36.7 °C) 98 °F (36.7 °C) 97.8 °F (36.6 °C)   TempSrc: Temporal Temporal Temporal Temporal   SpO2: 98% 97% 97%    Weight:       Height:               Intake/Output Summary (Last 24 hours) at 5/6/2021 1136  Last data filed at 5/6/2021 0830  Gross per 24 hour   Intake 668.17 ml   Output 575 ml   Net 93.17 ml         Recent Labs     05/05/21  0633 05/06/21  0633   WBC 5.8 5.6   HGB 15.3 15.8   HCT 44.1 46.7    252      Recent Labs     05/04/21  0639 05/05/21  0633 05/06/21  0633   BUN 10 15 17   CREATININE 1.1 1.1 1.0         ROS:   Negative for CP, palpitations, SOB at rest, dizziness/lightheadedness. Physical Exam   Constitutional: Oriented to person, place, and time. Appears well-developed. No distress. Cardiovascular: Normal rate, regular rhythm and normal heart sounds. Pulmonary/Chest: Effort normal. No respiratory distress. Abdominal: Soft. Bowel sounds are normal.   Musculoskeletal: Normal range of motion. Neurological: alert and oriented to person, place, and time. Skin: Skin is warm and dry. Psychiatric: normal mood and affect. A/P:  1) A/P: NSTEMI, MVCAD  - on heparin gtt, CP free at present  - awaiting brilinta washout - surgery scheduled for 2nd case 5/10  - cont supportive care  - on ACE- will need held DOS        Pre-operative testing review:   --carotids with no significant stenosis  --ramona decreased to 0.81 on left and 0.59 on right   --ct chest reviewed  --TTE with no significant valvular abnormalities.   EF 45-50%  --pft with FEV1 91% percent of predicted and DLCO 70% percent of predicted  --hgA1c 5.8        Note: 25 minutes was spent providing face-to-face patient care, including:  and coordinating care, reviewing the chart, labs, and diagnostics, as well as medical decision making. Greater than 50% of this time was spent instructing and counseling the patient face to face regarding findings and recommendations.

## 2021-05-07 ENCOUNTER — ANESTHESIA EVENT (OUTPATIENT)
Dept: OPERATING ROOM | Age: 51
DRG: 233 | End: 2021-05-07

## 2021-05-07 LAB
ANION GAP SERPL CALCULATED.3IONS-SCNC: 9 MMOL/L (ref 7–16)
APTT: 57.1 SEC (ref 24.5–35.1)
BUN BLDV-MCNC: 16 MG/DL (ref 6–20)
CALCIUM SERPL-MCNC: 9.8 MG/DL (ref 8.6–10.2)
CHLORIDE BLD-SCNC: 104 MMOL/L (ref 98–107)
CO2: 26 MMOL/L (ref 22–29)
CREAT SERPL-MCNC: 1.2 MG/DL (ref 0.7–1.2)
GFR AFRICAN AMERICAN: >60
GFR NON-AFRICAN AMERICAN: >60 ML/MIN/1.73
GLUCOSE BLD-MCNC: 101 MG/DL (ref 74–99)
HCT VFR BLD CALC: 46.7 % (ref 37–54)
HEMOGLOBIN: 15.6 G/DL (ref 12.5–16.5)
MCH RBC QN AUTO: 32.2 PG (ref 26–35)
MCHC RBC AUTO-ENTMCNC: 33.4 % (ref 32–34.5)
MCV RBC AUTO: 96.3 FL (ref 80–99.9)
PDW BLD-RTO: 12.3 FL (ref 11.5–15)
PLATELET # BLD: 350 E9/L (ref 130–450)
PMV BLD AUTO: 9 FL (ref 7–12)
POTASSIUM REFLEX MAGNESIUM: 4.7 MMOL/L (ref 3.5–5)
RBC # BLD: 4.85 E12/L (ref 3.8–5.8)
SODIUM BLD-SCNC: 139 MMOL/L (ref 132–146)
WBC # BLD: 5.3 E9/L (ref 4.5–11.5)

## 2021-05-07 PROCEDURE — 6370000000 HC RX 637 (ALT 250 FOR IP): Performed by: INTERNAL MEDICINE

## 2021-05-07 PROCEDURE — 99232 SBSQ HOSP IP/OBS MODERATE 35: CPT | Performed by: INTERNAL MEDICINE

## 2021-05-07 PROCEDURE — 36415 COLL VENOUS BLD VENIPUNCTURE: CPT

## 2021-05-07 PROCEDURE — 85027 COMPLETE CBC AUTOMATED: CPT

## 2021-05-07 PROCEDURE — 80048 BASIC METABOLIC PNL TOTAL CA: CPT

## 2021-05-07 PROCEDURE — 85730 THROMBOPLASTIN TIME PARTIAL: CPT

## 2021-05-07 PROCEDURE — 2060000000 HC ICU INTERMEDIATE R&B

## 2021-05-07 RX ADMIN — RANOLAZINE 500 MG: 500 TABLET, FILM COATED, EXTENDED RELEASE ORAL at 20:49

## 2021-05-07 RX ADMIN — ISOSORBIDE MONONITRATE 30 MG: 30 TABLET ORAL at 20:49

## 2021-05-07 RX ADMIN — ISOSORBIDE MONONITRATE 30 MG: 30 TABLET ORAL at 09:44

## 2021-05-07 RX ADMIN — LISINOPRIL 40 MG: 20 TABLET ORAL at 09:44

## 2021-05-07 RX ADMIN — ASPIRIN 81 MG CHEWABLE TABLET 81 MG: 81 TABLET CHEWABLE at 09:44

## 2021-05-07 RX ADMIN — ATORVASTATIN CALCIUM 80 MG: 80 TABLET, FILM COATED ORAL at 20:49

## 2021-05-07 RX ADMIN — RANOLAZINE 500 MG: 500 TABLET, FILM COATED, EXTENDED RELEASE ORAL at 09:44

## 2021-05-07 RX ADMIN — AMLODIPINE BESYLATE 10 MG: 10 TABLET ORAL at 09:44

## 2021-05-07 ASSESSMENT — PAIN SCALES - GENERAL
PAINLEVEL_OUTOF10: 0
PAINLEVEL_OUTOF10: 0

## 2021-05-07 ASSESSMENT — PAIN SCALES - WONG BAKER: WONGBAKER_NUMERICALRESPONSE: 0

## 2021-05-07 ASSESSMENT — LIFESTYLE VARIABLES: SMOKING_STATUS: 1

## 2021-05-07 NOTE — PROGRESS NOTES
CC: CP    Brief HPI:  Patient seen with Dr. Joel Will. Awake, alert. No complaints. Past Medical History:   Diagnosis Date    CAD (coronary artery disease)     nstemi 5/2    Hyperlipidemia     Hypertension      Past Surgical History:   Procedure Laterality Date    CARDIAC CATHETERIZATION  05/03/2021    Dr. Bartholomew Spotted History    Marital status:      Spouse name: Not on file    Number of children: Not on file    Years of education: Not on file    Highest education level: Not on file   Occupational History    Not on file   Social Needs    Financial resource strain: Not on file    Food insecurity     Worry: Not on file     Inability: Not on file   [employer] needs     Medical: Not on file     Non-medical: Not on file   Tobacco Use    Smoking status: Current Every Day Smoker     Packs/day: 0.03     Types: Cigars    Smokeless tobacco: Never Used    Tobacco comment: 5 skinny cigars a day.    Substance and Sexual Activity    Alcohol use: No    Drug use: No    Sexual activity: Yes     Partners: Female   Lifestyle    Physical activity     Days per week: Not on file     Minutes per session: Not on file    Stress: Not on file   Relationships    Social connections     Talks on phone: Not on file     Gets together: Not on file     Attends Synagogue service: Not on file     Active member of club or organization: Not on file     Attends meetings of clubs or organizations: Not on file     Relationship status: Not on file    Intimate partner violence     Fear of current or ex partner: Not on file     Emotionally abused: Not on file     Physically abused: Not on file     Forced sexual activity: Not on file   Other Topics Concern    Not on file   Social History Narrative    Not on file     Family History   Problem Relation Age of Onset    Diabetes Mother     Diabetes Father     High Blood Pressure Father     Stroke Father     Cancer Paternal Grandfather     No Known Problems Sister     High Blood Pressure Brother     No Known Problems Sister     No Known Problems Sister     No Known Problems Sister        Vitals:    05/06/21 0830 05/06/21 1545 05/06/21 2051 05/07/21 0930   BP: 135/62 119/72 (!) 141/76 116/65   Pulse: 58 59 58 55   Resp: 17 16 26 18   Temp: 97.8 °F (36.6 °C) 97.8 °F (36.6 °C) 96.9 °F (36.1 °C) 97.8 °F (36.6 °C)   TempSrc: Temporal Temporal Temporal Temporal   SpO2:   98%    Weight:       Height:               Intake/Output Summary (Last 24 hours) at 5/7/2021 1036  Last data filed at 5/7/2021 0841  Gross per 24 hour   Intake 360 ml   Output --   Net 360 ml         Recent Labs     05/05/21  0633 05/06/21  0633 05/07/21  0651   WBC 5.8 5.6 5.3   HGB 15.3 15.8 15.6   HCT 44.1 46.7 46.7    252 350      Recent Labs     05/05/21 0633 05/06/21  0633 05/07/21  0651   BUN 15 17 16   CREATININE 1.1 1.0 1.2         ROS:   Negative for CP, palpitations, SOB at rest, dizziness/lightheadedness. Physical Exam   Constitutional: Oriented to person, place, and time. Appears well-developed. No distress. Cardiovascular: Normal rate, regular rhythm and normal heart sounds. Pulmonary/Chest: Effort normal. No respiratory distress. Abdominal: Soft. Bowel sounds are normal.   Musculoskeletal: Normal range of motion. Neurological: alert and oriented to person, place, and time. Skin: Skin is warm and dry. Psychiatric: normal mood and affect.          A/P:  1) NSTEMI, MVCAD  --awaiting brilinta washout - surgery scheduled for 2nd case 5/10  --NO ACE DAY OF SURGERY  --NOT ON BB DUE TO SINUS BRADYCARDIA--per cardiology   --will stop heparin infusion on-call to surgery        Pre-operative testing review:   --carotids with no significant stenosis  --ramona with decreased ankle brachial index of 0.81 on the right and 0.59 on the  left, with adequate collateral flow to both feet   --ct chest reviewed  --TTE with no significant LV dysfunction or valvular abnormalities, EF ~45-50%  --pft with FEV1 91 percent of predicted and DLCO 70 percent of predicted  --hgA1c 5.8          Note: 25 minutes was spent providing face-to-face patient care, including:  and coordinating care, reviewing the chart, labs, and diagnostics, as well as medical decision making. Greater than 50% of this time was spent instructing and counseling the patient face to face regarding findings and recommendations.

## 2021-05-07 NOTE — PROGRESS NOTES
Inpatient Cardiology Progress note     PATIENT IS BEING FOLLOWED FOR: NSTEMI     Joseph Awan is a 45year old  male who was seen in initial consultation with Dr. Paco Palacios on 05/02/2021. SUBJECTIVE:  No CP or SOB  OBJECTIVE: No apparent distress     ROS:  Consist: Denies fevers, chills or night sweats  Heart: Denies chest pain, palpitations, lightheadedness, dizziness or syncope  Lungs: Denies SOB, cough, wheezing, orthopnea or PND  GI: Denies abdominal pain, vomiting or diarrhea    PHYSICAL EXAM:   /65   Pulse 55   Temp 97.8 °F (36.6 °C) (Temporal)   Resp 18   Ht 5' 11\" (1.803 m)   Wt 221 lb 9.6 oz (100.5 kg)   SpO2 98%   BMI 30.91 kg/m²    CONST: Well developed, well nourished obese, middle aged male who appears of his stated age. Awake, alert and cooperative. No apparent distress  HEENT:   Head- Normocephalic, atraumatic   Eyes- Conjunctivae pink, anicteric  Throat- Oral mucosa pink and moist  Neck-  No stridor, trachea midline, no jugular venous distention. No carotid bruit  CHEST: Chest symmetrical and non-tender to palpation. No accessory muscle use or intercostal retractions  RESPIRATORY:  Lung sounds -diminished with rare rales  CARDIOVASCULAR:     Heart Inspection- shows no noted pulsations  Heart Palpation- no heaves or thrills; PMI is non-displaced   Heart Ausculation- Regular rate and rhythm, no murmur. No s3, s4 or rub   PV: No lower extremity edema. No varicosities. Pedal pulses palpable, no clubbing or cyanosis. Right radial access site without hematoma and with good pulse. Bruise / hematoma right arm lateral aspect at the level of the antecubital fossa noted prior to the cath procedure  ABDOMEN: Soft, non-tender to light palpation. Bowel sounds present. No palpable masses no organomegaly; no abdominal bruit  MS: Good muscle strength and tone. No atrophy or abnormal movements.    : Deferred  SKIN: Warm and dry no statis dermatitis or ulcers   NEURO / PSYCH: Oriented recommended. 05/03/2021 CT Chest without contrast:   Right greater than left basal opacities favored to represent atelectasis. No significant acute cardiopulmonary process otherwise. 05/03/2021 Bilateral Carotid Ultrasound: Atherosclerotic disease. No hemodynamically significant stenosis is identified  Estimated stenosis by NASCET criteria in the proximal right carotid artery is between 0% and 49%. Estimated stenosis by NASCET criteria in the proximal left carotid artery is between 0% and 49%. 05/03/2021 Cardiac Catheterization (Dr. Mike Calvillo):   Findings:  Left main: 50  stenosis  LAD:  %  stenosis  Circumflex: 70 %   stenosis  RCA: Dominant. 100 %  stenosis  LV angio: 40-45%  ejection fraction  Hemodynamics:  LV: 147 mmHg. EDP: 3 No gradient across AV. Ao: 140/77 ( 97 ). 05/04/2021 CXR:   No acute process     05/05/2021 Echocardiogram (Dr. Mike Calvillo):   Left ventricle is normal in size. Moderate to severe concentric left ventricular hypertrophy. There is apical wall thinning an akinesis  Ejection fraction is visually estimated at 45-50%. There is doppler evidence of stage I diastolic dysfunction. Normal right ventricular size and function. Normal sized left atrium. No valvular abnormalities. 12 lead EKG 5/2/21: SR with left atrial enlargement, possible old inferior MI, J point elevation V1-V4 (old) and new T wave inversions V3 to V6    Telemetry: SR/SB       ASSESSMENT:   1. NSTEMI s/p cardiac catheterization 05/03/2021 with severe multivessel CAD; CT Surgery on case with plans for CABG after Brilinta washout. No recurrence of chest pain since admission. EF 45-50% per echo this admission   2. Hypertension, improving with change in medications; Moderate to severe LVH per echo this admission  3. Hypercholesterolemia (with low HDL), on statin    4. Tobacco abuse. 5. Sinus bradycardia, reason why not on BB therapy   6. Obesity. PLAN:  1. Continue current cardiac medications  2.  CABG Monday per patient  3. Cardiology will see prn.  Please call if needed    Electronically signed by London Mcdoewll MD on 5/7/2021 at 2:47 PM

## 2021-05-07 NOTE — ANESTHESIA PRE PROCEDURE
sodium chloride flush 0.9 % injection 5-40 mL  5-40 mL Intravenous PRN Baldo Desouza MD        0.9 % sodium chloride infusion  25 mL Intravenous PRN Baldo Desouza MD        ranolazine [facility]) extended release tablet 500 mg  500 mg Oral BID Baldo Desouza MD   500 mg at 05/10/21 0743    hydrALAZINE (APRESOLINE) tablet 25 mg  25 mg Oral Q6H PRN Ramsey Carrasquillo DO   25 mg at 05/03/21 1801    nitroGLYCERIN (NITROSTAT) SL tablet 0.4 mg  0.4 mg Sublingual Once Baldo Desouza MD        aspirin chewable tablet 81 mg  81 mg Oral Daily Baldo Desouza MD   Stopped at 05/10/21 0746    promethazine (PHENERGAN) tablet 12.5 mg  12.5 mg Oral Q6H PRN Baldo Desouza MD        Or    ondansetron [facility]) injection 4 mg  4 mg Intravenous Q6H PRN Baldo Desouza MD        acetaminophen (TYLENOL) tablet 650 mg  650 mg Oral Q6H PRN Baldo Desouza MD   650 mg at 05/03/21 0801    Or    acetaminophen (TYLENOL) suppository 650 mg  650 mg Rectal Q6H PRN Baldo Desouza MD        polyethylene glycol (GLYCOLAX) packet 17 g  17 g Oral Daily PRN Baldo Desouza MD        atorvastatin (LIPITOR) tablet 80 mg  80 mg Oral Nightly Baldo Desouza MD   80 mg at 05/09/21 2113    perflutren lipid microspheres (DEFINITY) injection 1.65 mg  1.5 mL Intravenous ONCE PRN Baldo Desouza MD        heparin (porcine) injection 4,000 Units  4,000 Units Intravenous PRN Baldo Desouza MD        heparin (porcine) injection 2,000 Units  2,000 Units Intravenous PRN Baldo Desouza MD        amLODIPine (NORVASC) tablet 10 mg  10 mg Oral Daily Baldo Desouza MD   10 mg at 05/10/21 6282     Facility-Administered Medications Ordered in Other Encounters   Medication Dose Route Frequency Provider Last Rate Last Admin    vecuronium (NORCURON) injection    PRN LINDA Johnson - CRNA   20 mg at 05/10/21 1231    lidocaine (cardiac) (XYLOCAINE) injection    PRN LINDA Johnson - CRNA   100 mg at 05/10/21 1231    propofol injection    PRN Imtiaz Sadler Bernice, APRN - CRNA   200 mg at 05/10/21 1231    fentaNYL (SUBLIMAZE) injection    PRN Oddis Danas, APRN - CRNA   350 mcg at 05/10/21 1307    lactated ringers infusion    Continuous PRN Oddis Danas, APRN - CRNA   New Bag at 05/10/21 1223    aminocaproic acid (AMICAR) injection    PRN Oddis Danas, APRN - CRNA   5,000 mg at 05/10/21 1245    aminocaproic acid (AMICAR) 10 g in sodium chloride 0.9 % 20 mL infusion    Continuous PRN Oddis Danas, APRN - CRNA 2 mL/hr at 05/10/21 1248 1 g/hr at 05/10/21 1248    glycopyrrolate (ROBINUL) injection    PRN Maverick Orozco, APRN - CRNA   0.2 mg at 05/10/21 1314       Allergies:  No Known Allergies    Problem List:    Patient Active Problem List   Diagnosis Code    Essential hypertension I10    NSTEMI (non-ST elevated myocardial infarction) ([facility] [address] I21.4    Chest pain R07.9       Past Medical History:        Diagnosis Date    CAD (coronary artery disease)     nstemi 5/2    Hyperlipidemia     Hypertension        Past Surgical History:        Procedure Laterality Date    CARDIAC CATHETERIZATION  05/03/2021    Dr. Taylor Mccracken       Social History:    Social History     Tobacco Use    Smoking status: Current Every Day Smoker     Packs/day: 0.03     Types: Cigars    Smokeless tobacco: Never Used    Tobacco comment: 5 skinny cigars a day. Substance Use Topics    Alcohol use: No                                Ready to quit: Not Answered  Counseling given: Not Answered  Comment: 5 skinny cigars a day.       Vital Signs (Current):   Vitals:    05/10/21 1130 05/10/21 1145 05/10/21 1200 05/10/21 1215   BP: 133/72 136/75 133/78 (!) 149/72   Pulse: 50 (!) 49 50 50   Resp: 19 17 12 10   Temp:       TempSrc:       SpO2: 97% 96% 97% 97%   Weight:       Height:                                                  BP Readings from Last 3 Encounters:   05/10/21 (!) 149/72   10/14/19 (!) 196/114   03/01/19 (!) 148/82       NPO Status: Time of last liquid consumption: 2200Advised to be NPO after midnight the night before surgery                        Time of last solid consumption: 1700                        Date of last liquid consumption: 05/09/21                        Date of last solid food consumption: 05/09/21    BMI:   Wt Readings from Last 3 Encounters:   05/10/21 217 lb (98.4 kg)   10/14/19 242 lb (109.8 kg)   03/01/19 238 lb (108 kg)     Body mass index is 30.27 kg/m². CBC:   Lab Results   Component Value Date    WBC 7.0 05/10/2021    RBC 4.85 05/10/2021    HGB 15.7 05/10/2021    HCT 47.4 05/10/2021    MCV 97.7 05/10/2021    RDW 12.5 05/10/2021     05/10/2021       CMP:   Lab Results   Component Value Date     05/10/2021    K 4.3 05/10/2021    K 4.3 05/10/2021     05/10/2021    CO2 23 05/10/2021    BUN 15 05/10/2021    CREATININE 1.1 05/10/2021    GFRAA >60 05/10/2021    LABGLOM >60 05/10/2021    GLUCOSE 103 05/10/2021    PROT 6.9 05/06/2021    CALCIUM 10.0 05/10/2021    BILITOT 0.4 05/06/2021    ALKPHOS 62 05/06/2021    AST 20 05/06/2021    ALT 20 05/06/2021       POC Tests: No results for input(s): POCGLU, POCNA, POCK, POCCL, POCBUN, POCHEMO, POCHCT in the last 72 hours.     Coags:   Lab Results   Component Value Date    PROTIME 12.8 05/09/2021    INR 1.2 05/09/2021    APTT 50.7 05/10/2021       HCG (If Applicable): No results found for: PREGTESTUR, PREGSERUM, HCG, HCGQUANT     ABGs: No results found for: PHART, PO2ART, YTV6HVA, QHV8WWH, BEART, E4SOXIUF     Type & Screen (If Applicable):  No results found for: LABABO, LABRH    Drug/Infectious Status (If Applicable):  No results found for: HIV, HEPCAB    COVID-19 Screening (If Applicable):   Lab Results   Component Value Date    COVID19 Not Detected 05/02/2021     CXR 5/4/2021    FINDINGS:   Heart size is normal.  There are no infiltrates or effusions.  Previous   basilar infiltrates have resolved.           Impression   No acute process     EKG 5/4/2021    Ventricular Rate 59  BPM Final 05/04/2021  9:44 AM [facility]   Atrial Rate 59  BPM Final 05/04/2021  9:44 AM [facility]   P-R Interval 162  ms Final 05/04/2021  9:44 AM [facility]   QRS Duration 100  ms Final 05/04/2021  9:44 AM [facility]   Q-T Interval 406  ms Final 05/04/2021  9:44 AM [facility]   QTc Calculation (Bazett) 401  ms Final 05/04/2021  9:44 AM [facility]   P [place] 54  degrees Final 05/04/2021  9:44 AM [facility]   R [place] 38  degrees Final 05/04/2021  9:44 AM [facility]   T [place] -171  degrees Final 05/04/2021  9:44 AM [facility]   Testing Performed By    Lab - Abbreviation Name Director Address Valid Date Range   360-[facility] MUSE Unknown Unknown 04/18/16 0721-Present   Narrative & Impression    Sinus bradycardia  Left ventricular hypertrophy with repolarization abnormality  Abnormal ECG  When compared with ECG of 03-MAY-2021 11:39,  No significant change was found     ECHO 5/5/2021     Findings      Left Ventricle   Left ventricle is normal in size . Moderate to severe concentric left ventricular hypertrophy. There is apical wall thinning an akinesis   Ejection fraction is visually estimated at 45-50%. There is doppler evidence of stage I diastolic dysfunction. Right Ventricle   Normal right ventricular size and function. Left Atrium   Normal sized left atrium. Interatrial septum appears intact. Right Atrium   Normal right atrium size. Mitral Valve   Normal mitral valve structure and function. Physiologic and/or trace mitral regurgitation is present. Tricuspid Valve   Normal tricuspid valve structure and function. Aortic Valve   Normal aortic valve structure and function. Pulmonic Valve   Normal pulmonic valve structure and function. Pericardial Effusion   No evidence of pericardial effusion. Aorta   Aortic root dimension within normal limits. Conclusions      Summary   Left ventricle is normal in size . Moderate to severe concentric left ventricular hypertrophy.    There is apical dilated left ventricle with regional wall motion abnormality  as described above with moderately impaired left ventricular systolic  function with an estimated ejection fraction of 40%. Anesthesia Evaluation  Patient summary reviewed and Nursing notes reviewed no history of anesthetic complications:   Airway: Mallampati: II  TM distance: >3 FB   Neck ROM: full  Mouth opening: > = 3 FB Dental:          Pulmonary: breath sounds clear to auscultation  (+) current smoker (cigars)          Patient did not smoke on day of surgery. Cardiovascular:    (+) hypertension:, angina: with exertion, past MI:, CAD:, GARCIA:,       ECG reviewed  Rhythm: regular  Rate: normal  Echocardiogram reviewed         Beta Blocker:  Not on Beta Blocker      ROS comment: Patient reports recurrent episodes of chest pain that would involve the left arm with shortness of breath and nausea with physical activity that were relived by rest prior to admission. Beta blocker held secondary to significant bradycardia     Neuro/Psych:   Negative Neuro/Psych ROS              GI/Hepatic/Renal: Neg GI/Hepatic/Renal ROS            Endo/Other: Negative Endo/Other ROS                    Abdominal:           Vascular: negative vascular ROS. Anesthesia Plan      general     ASA 4       Induction: intravenous. arterial line, BIS, central line, CVP and PAULETTE  MIPS: Postoperative opioids intended and Postoperative ventilation. Anesthetic plan and risks discussed with patient. Use of blood products discussed with patient whom consented to blood products. Plan discussed with CRNA and attending.                   Reg Cooper DO   5/10/2021

## 2021-05-07 NOTE — CARE COORDINATION
Reviewed chart, pt for CABG next week, needs to finish Brilinta washout. Dietitian consulted for cardiac diet. Plan is home at discharge.  Linette Barriga YRN

## 2021-05-07 NOTE — PROGRESS NOTES
Hospitalist Progress Note      SYNOPSIS: Patient admitted on 2021 for shortness of breath and chest pain. 79-year-old male with past medical history significant for hypertension came to our ER on  with chief complaint of chest pain and shortness of breath. He also complained of symptomatic bradycardia with heart rate of 40s on standing with some lightheadedness. In the ER his troponins were elevated to 0.53 and lactic acid of 2.3. Chest x-ray was concerning for pneumonia with left greater than right parenchymal infiltrates. He was also in hypertensive urgency and was started on nitroglycerin with complete resolution of his chest pain and improvement in his blood pressure. Patient was also started on IV heparin for non-ST segment elevation MI. Patient underwent cardiac cath on 5/3 and was found to have severe multivessel disease but called for cardiothoracic evaluation. Evaluated by cardiothoracic surgeon with a plan to undergo CABG during this admission. SUBJECTIVE:    Uneventful night  No new complaints        Stable overnight. No other overnight issues reported. Temp (24hrs), Av.4 °F (36.3 °C), Min:96.9 °F (36.1 °C), Max:97.8 °F (36.6 °C)    DIET: DIET CARDIAC;  CODE: Full Code    Intake/Output Summary (Last 24 hours) at 2021 1550  Last data filed at 2021 1248  Gross per 24 hour   Intake 680 ml   Output --   Net 680 ml       OBJECTIVE:    /65   Pulse 55   Temp 97.8 °F (36.6 °C) (Temporal)   Resp 18   Ht 5' 11\" (1.803 m)   Wt 221 lb 9.6 oz (100.5 kg)   SpO2 98%   BMI 30.91 kg/m²     General appearance: No apparent distress, appears stated age and cooperative. HEENT:  Conjunctivae/corneas clear. Neck: Supple. No jugular venous distention.    Respiratory: Clear to auscultation bilaterally, normal respiratory effort  Cardiovascular: Regular rate rhythm, normal S1-S2  Abdomen: Soft, nontender, nondistended  Musculoskeletal: No clubbing, cyanosis, no bilateral lower extremity edema. Brisk capillary refill. Skin:  No rashes  on visible skin  Neurologic: awake, alert and following commands     ASSESSMENT & PLAN:    Non-ST segment elevation MI  Continue heparin and nitroglycerin drip  Cardiac cath 5/3: Showed severe multivessel coronary artery disease  For CABG on Monday  Ranexa 500 mg p.o. twice daily    Symptomatic bradycardia  Monitor for now  Improved    Lactic acidosis resolved    Hypertension  Presented with hypertensive urgency  Currently off IV drugs  Blood pressure well controlled with Norvasc 10, Imdur 30 mg p.o. twice daily, lisinopril 40 mg p.o. daily    Parenchymal infiltrate  Likely from fluid overload  Procalcitonin 0.08  Monitor off antibiotics    Tobacco abuse  Cessation education    Hyperlipidemia  Continue statins    Hypoglycemia  Hemoglobin A1c 5.8  Recommend dietary modifications and exercise    DVT prophylaxis  Subcu heparin    DISPOSITION:     After CABG.     Medications:  REVIEWED DAILY    Infusion Medications    sodium chloride      sodium chloride      sodium chloride      heparin (PORCINE) Infusion 7.1 Units/kg/hr (05/06/21 2784)     Scheduled Medications    isosorbide mononitrate  30 mg Oral BID    sodium chloride flush  5-40 mL Intravenous 2 times per day    ranolazine  500 mg Oral BID    nitroGLYCERIN  0.4 mg Sublingual Once    aspirin  81 mg Oral Daily    lisinopril  40 mg Oral Daily    atorvastatin  80 mg Oral Nightly    amLODIPine  10 mg Oral Daily     PRN Meds: sodium chloride flush, sodium chloride, hydrALAZINE, promethazine **OR** ondansetron, acetaminophen **OR** acetaminophen, polyethylene glycol, perflutren lipid microspheres, heparin (porcine), heparin (porcine)    Labs:     Recent Labs     05/05/21  0633 05/06/21  0633 05/07/21  0651   WBC 5.8 5.6 5.3   HGB 15.3 15.8 15.6   HCT 44.1 46.7 46.7    252 350       Recent Labs     05/05/21  0633 05/06/21  0633 05/07/21  0651    141 139   K 4.4  4.4 4.6  4.6 4.7   CL 105 107 104   CO2 27 22 26   BUN 15 17 16   CREATININE 1.1 1.0 1.2   CALCIUM 9.7 9.8 9.8       Recent Labs     05/06/21  0633   PROT 6.9   ALKPHOS 62   ALT 20   AST 20   BILITOT 0.4       No results for input(s): INR in the last 72 hours. No results for input(s): Debbi Cruzock in the last 72 hours. Chronic labs:    Lab Results   Component Value Date    CHOL 230 (H) 05/02/2021    TRIG 80 05/02/2021    HDL 37 05/02/2021    LDLCALC 177 (H) 05/02/2021    TSH 0.489 05/03/2021    INR 1.2 05/02/2021    LABA1C 5.8 (H) 05/02/2021       Radiology: REVIEWED DAILY    +++++++++++++++++++++++++++++++++++++++++++++++++  Cheo BAXTER [place], [place]  +++++++++++++++++++++++++++++++++++++++++++++++++  NOTE: This report was transcribed using voice recognition software. Every effort was made to ensure accuracy; however, inadvertent computerized transcription errors may be present.

## 2021-05-07 NOTE — PLAN OF CARE
Problem: Cardiac Output - Decreased:  Goal: Hemodynamic stability will improve  Description: Hemodynamic stability will improve  Outcome: Met This Shift     Problem: Falls - Risk of:  Goal: Absence of physical injury  Description: Absence of physical injury  Outcome: Met This Shift     Problem: Skin Integrity:  Goal: Absence of new skin breakdown  Description: Absence of new skin breakdown  Outcome: Met This Shift

## 2021-05-08 LAB
ANION GAP SERPL CALCULATED.3IONS-SCNC: 8 MMOL/L (ref 7–16)
APTT: 53 SEC (ref 24.5–35.1)
BUN BLDV-MCNC: 20 MG/DL (ref 6–20)
CALCIUM SERPL-MCNC: 9.8 MG/DL (ref 8.6–10.2)
CHLORIDE BLD-SCNC: 104 MMOL/L (ref 98–107)
CO2: 28 MMOL/L (ref 22–29)
CREAT SERPL-MCNC: 1.3 MG/DL (ref 0.7–1.2)
GFR AFRICAN AMERICAN: >60
GFR NON-AFRICAN AMERICAN: >60 ML/MIN/1.73
GLUCOSE BLD-MCNC: 99 MG/DL (ref 74–99)
HCT VFR BLD CALC: 45.2 % (ref 37–54)
HEMOGLOBIN: 15.3 G/DL (ref 12.5–16.5)
MCH RBC QN AUTO: 32.6 PG (ref 26–35)
MCHC RBC AUTO-ENTMCNC: 33.8 % (ref 32–34.5)
MCV RBC AUTO: 96.4 FL (ref 80–99.9)
PDW BLD-RTO: 12.4 FL (ref 11.5–15)
PLATELET # BLD: 344 E9/L (ref 130–450)
PMV BLD AUTO: 8.5 FL (ref 7–12)
POTASSIUM REFLEX MAGNESIUM: 4.7 MMOL/L (ref 3.5–5)
POTASSIUM SERPL-SCNC: 4.7 MMOL/L (ref 3.5–5)
RBC # BLD: 4.69 E12/L (ref 3.8–5.8)
SODIUM BLD-SCNC: 140 MMOL/L (ref 132–146)
WBC # BLD: 5.5 E9/L (ref 4.5–11.5)

## 2021-05-08 PROCEDURE — 80048 BASIC METABOLIC PNL TOTAL CA: CPT

## 2021-05-08 PROCEDURE — 99232 SBSQ HOSP IP/OBS MODERATE 35: CPT | Performed by: THORACIC SURGERY (CARDIOTHORACIC VASCULAR SURGERY)

## 2021-05-08 PROCEDURE — 85027 COMPLETE CBC AUTOMATED: CPT

## 2021-05-08 PROCEDURE — 2580000003 HC RX 258: Performed by: INTERNAL MEDICINE

## 2021-05-08 PROCEDURE — 6370000000 HC RX 637 (ALT 250 FOR IP): Performed by: INTERNAL MEDICINE

## 2021-05-08 PROCEDURE — 36415 COLL VENOUS BLD VENIPUNCTURE: CPT

## 2021-05-08 PROCEDURE — 85730 THROMBOPLASTIN TIME PARTIAL: CPT

## 2021-05-08 PROCEDURE — 2060000000 HC ICU INTERMEDIATE R&B

## 2021-05-08 RX ORDER — SODIUM CHLORIDE 9 MG/ML
INJECTION, SOLUTION INTRAVENOUS CONTINUOUS
Status: DISCONTINUED | OUTPATIENT
Start: 2021-05-08 | End: 2021-05-09

## 2021-05-08 RX ADMIN — RANOLAZINE 500 MG: 500 TABLET, FILM COATED, EXTENDED RELEASE ORAL at 08:13

## 2021-05-08 RX ADMIN — LISINOPRIL 40 MG: 20 TABLET ORAL at 08:13

## 2021-05-08 RX ADMIN — RANOLAZINE 500 MG: 500 TABLET, FILM COATED, EXTENDED RELEASE ORAL at 20:29

## 2021-05-08 RX ADMIN — ASPIRIN 81 MG CHEWABLE TABLET 81 MG: 81 TABLET CHEWABLE at 08:13

## 2021-05-08 RX ADMIN — ISOSORBIDE MONONITRATE 30 MG: 30 TABLET ORAL at 20:30

## 2021-05-08 RX ADMIN — ATORVASTATIN CALCIUM 80 MG: 80 TABLET, FILM COATED ORAL at 20:29

## 2021-05-08 RX ADMIN — Medication 10 ML: at 20:30

## 2021-05-08 RX ADMIN — ISOSORBIDE MONONITRATE 30 MG: 30 TABLET ORAL at 08:13

## 2021-05-08 RX ADMIN — SODIUM CHLORIDE: 9 INJECTION, SOLUTION INTRAVENOUS at 11:50

## 2021-05-08 RX ADMIN — AMLODIPINE BESYLATE 10 MG: 10 TABLET ORAL at 08:13

## 2021-05-08 ASSESSMENT — PAIN SCALES - GENERAL: PAINLEVEL_OUTOF10: 0

## 2021-05-08 ASSESSMENT — PAIN SCALES - WONG BAKER: WONGBAKER_NUMERICALRESPONSE: 0

## 2021-05-08 NOTE — PROGRESS NOTES
CC:CP    Brief HPI:  Awake, alert. No complaints. Past Medical History:   Diagnosis Date    CAD (coronary artery disease)     nstemi 5/2    Hyperlipidemia     Hypertension      Past Surgical History:   Procedure Laterality Date    CARDIAC CATHETERIZATION  05/03/2021    Dr. Dana Ricketts History    Marital status:      Spouse name: Not on file    Number of children: Not on file    Years of education: Not on file    Highest education level: Not on file   Occupational History    Not on file   Social Needs    Financial resource strain: Not on file    Food insecurity     Worry: Not on file     Inability: Not on file   [employer] needs     Medical: Not on file     Non-medical: Not on file   Tobacco Use    Smoking status: Current Every Day Smoker     Packs/day: 0.03     Types: Cigars    Smokeless tobacco: Never Used    Tobacco comment: 5 skinny cigars a day.    Substance and Sexual Activity    Alcohol use: No    Drug use: No    Sexual activity: Yes     Partners: Female   Lifestyle    Physical activity     Days per week: Not on file     Minutes per session: Not on file    Stress: Not on file   Relationships    Social connections     Talks on phone: Not on file     Gets together: Not on file     Attends [employer] service: Not on file     Active member of club or organization: Not on file     Attends meetings of clubs or organizations: Not on file     Relationship status: Not on file    Intimate partner violence     Fear of current or ex partner: Not on file     Emotionally abused: Not on file     Physically abused: Not on file     Forced sexual activity: Not on file   Other Topics Concern    Not on file   Social History Narrative    Not on file     Family History   Problem Relation Age of Onset    Diabetes Mother     Diabetes Father     High Blood Pressure Father     Stroke Father     Cancer Paternal Grandfather     No Known Problems Sister     High Blood Pressure Brother     No Known Problems Sister     No Known Problems Sister     No Known Problems Sister        Vitals:    05/07/21 2045 05/07/21 2330 05/08/21 0552 05/08/21 0800   BP: (!) 140/59 (!) 111/57  (!) 123/95   Pulse: 58 68  64   Resp: 13 15  20   Temp: 96.8 °F (36 °C) 97.9 °F (36.6 °C)  98.3 °F (36.8 °C)   TempSrc: Temporal Temporal  Temporal   SpO2: 98% 96%  95%   Weight:   218 lb (98.9 kg)    Height:             Intake/Output Summary (Last 24 hours) at 5/8/2021 1053  Last data filed at 5/8/2021 0913  Gross per 24 hour   Intake 800 ml   Output --   Net 800 ml       Recent Labs     05/06/21  0633 05/07/21  0651 05/08/21  0611   WBC 5.6 5.3 5.5   HGB 15.8 15.6 15.3   HCT 46.7 46.7 45.2    350 344      Recent Labs     05/06/21  0633 05/07/21  0651 05/08/21  0611   BUN 17 16 20   CREATININE 1.0 1.2 1.3*       ROS:   Negative for CP, palpitations, SOB at rest, dizziness/lightheadedness. Physical Exam   Constitutional: Oriented to person, place, and time. Appears well-developed. No distress. CARDIOVASCULAR:  Normal apical impulse, regular rate and rhythm, normal S1 and S2, no S3 or S4, and no murmur noted  Pulmonary/Chest: Effort normal. No respiratory distress. Abdominal: Soft. Bowel sounds are normal.   Musculoskeletal: Normal range of motion. Neurological: alert and oriented to person, place, and time. Skin: Skin is warm and dry. Psychiatric: normal mood and affect.      A/P:  1) NSTEMI, MVCAD  --awaiting brilinta washout - surgery scheduled for 2nd case 5/10  --NO ACE DAY OF SURGERY  --NOT ON BB DUE TO SINUS BRADYCARDIA--per cardiology   --will stop heparin infusion on-call to surgery           Pre-operative testing review:   --carotids with no significant stenosis  --ramona with decreased ankle brachial index of 0.81 on the right and 0.59 on the  left, with adequate collateral flow to both feet   --ct chest reviewed  --TTE with no significant LV dysfunction or valvular abnormalities, EF ~45-50%  --pft with FEV1 91 percent of predicted and DLCO 70 percent of predicted  --hgA1c 5.8     OR Monday second case. All questions answered. Note: 25 minutes was spent providing face-to-face patient care, including:  and coordinating care, reviewing the chart, labs, and diagnostics, as well as medical decision making. Greater than 50% of this time was spent instructing and counseling the patient face to face regarding findings and recommendations.

## 2021-05-08 NOTE — PROGRESS NOTES
Hospitalist Progress Note      SYNOPSIS:      Patient admitted on 2021 for shortness of breath and chest pain.     48-year-old male with past medical history significant for hypertension came to our ER on  with chief complaint of chest pain and shortness of breath. He also complained of symptomatic bradycardia with heart rate of 40s on standing with some lightheadedness. In the ER his troponins were elevated to 0.53 and lactic acid of 2.3. Chest x-ray was concerning for pneumonia with left greater than right parenchymal infiltrates. He was also in hypertensive urgency and was started on nitroglycerin with complete resolution of his chest pain and improvement in his blood pressure. Patient was also started on IV heparin for non-ST segment elevation MI. Patient underwent cardiac cath on 5/3 and was found to have severe multivessel disease but called for cardiothoracic evaluation. Evaluated by cardiothoracic surgeon with a plan to undergo CABG during this admission. SUBJECTIVE:    Patient seen and examined  Records reviewed. Stable overnight. No other overnight issues reported. Temp (24hrs), Av.8 °F (36.6 °C), Min:96.8 °F (36 °C), Max:98.3 °F (36.8 °C)    DIET: DIET CARDIAC;  CODE: Full Code    Intake/Output Summary (Last 24 hours) at 2021 0856  Last data filed at 2021 1248  Gross per 24 hour   Intake 560 ml   Output --   Net 560 ml       OBJECTIVE:    BP (!) 123/95   Pulse 64   Temp 98.3 °F (36.8 °C) (Temporal)   Resp 20   Ht 5' 11\" (1.803 m)   Wt 218 lb (98.9 kg)   SpO2 95%   BMI 30.40 kg/m²     General appearance: No apparent distress, appears stated age and cooperative. HEENT:  Conjunctivae/corneas clear. Neck: Supple. No jugular venous distention.    Respiratory: Clear to auscultation bilaterally, normal respiratory effort  Cardiovascular: Regular rate rhythm, normal S1-S2  Abdomen: Soft, nontender, nondistended  Musculoskeletal: No clubbing, cyanosis, no bilateral lower extremity edema. Brisk capillary refill.    Skin:  No rashes  on visible skin  Neurologic: awake, alert and following commands     ASSESSMENT & PLAN:    Non-ST segment elevation MI  Continue heparin  Cardiac cath 5/3: Showed severe multivessel coronary artery disease  For CABG on Monday  Ranexa 500 mg p.o. twice daily    Elevated creatinine  Unclear etiology  Admitted with creatinine 1.0, today 1.3  Start on normal saline at 75 cc/h on 5/8  Hold lisinopril for now     Symptomatic bradycardia  Monitor for now  Improved     Lactic acidosis resolved     Hypertension  Presented with hypertensive urgency  Currently off IV drugs  Blood pressure well controlled with Norvasc 10, Imdur 30 mg p.o. twice daily, lisinopril 40 mg p.o. daily     Parenchymal infiltrate  Likely from fluid overload  Procalcitonin 0.08  Monitor off antibiotics     Tobacco abuse  Cessation education     Hyperlipidemia  Continue statins     Hypoglycemia  Hemoglobin A1c 5.8  Recommend dietary modifications and exercise     DVT prophylaxis  Subcu heparin       DISPOSITION:     Medications:  REVIEWED DAILY    Infusion Medications    sodium chloride      sodium chloride      sodium chloride      heparin (PORCINE) Infusion 7.1 Units/kg/hr (05/08/21 0814)     Scheduled Medications    isosorbide mononitrate  30 mg Oral BID    sodium chloride flush  5-40 mL Intravenous 2 times per day    ranolazine  500 mg Oral BID    nitroGLYCERIN  0.4 mg Sublingual Once    aspirin  81 mg Oral Daily    lisinopril  40 mg Oral Daily    atorvastatin  80 mg Oral Nightly    amLODIPine  10 mg Oral Daily     PRN Meds: sodium chloride flush, sodium chloride, hydrALAZINE, promethazine **OR** ondansetron, acetaminophen **OR** acetaminophen, polyethylene glycol, perflutren lipid microspheres, heparin (porcine), heparin (porcine)    Labs:     Recent Labs     05/06/21  0633 05/07/21  0651 05/08/21  0611   WBC 5.6 5.3 5.5   HGB 15.8 15.6 15.3   HCT 46.7 46.7 45.2

## 2021-05-09 LAB
ABO/RH: NORMAL
ANION GAP SERPL CALCULATED.3IONS-SCNC: 6 MMOL/L (ref 7–16)
ANTIBODY SCREEN: NORMAL
APTT: 53.5 SEC (ref 24.5–35.1)
BUN BLDV-MCNC: 15 MG/DL (ref 6–20)
CALCIUM SERPL-MCNC: 9.1 MG/DL (ref 8.6–10.2)
CHLORIDE BLD-SCNC: 108 MMOL/L (ref 98–107)
CO2: 29 MMOL/L (ref 22–29)
CREAT SERPL-MCNC: 1.1 MG/DL (ref 0.7–1.2)
GFR AFRICAN AMERICAN: >60
GFR NON-AFRICAN AMERICAN: >60 ML/MIN/1.73
GLUCOSE BLD-MCNC: 99 MG/DL (ref 74–99)
HCT VFR BLD CALC: 44.1 % (ref 37–54)
HEMOGLOBIN: 14.9 G/DL (ref 12.5–16.5)
INR BLD: 1.2
MCH RBC QN AUTO: 32.9 PG (ref 26–35)
MCHC RBC AUTO-ENTMCNC: 33.8 % (ref 32–34.5)
MCV RBC AUTO: 97.4 FL (ref 80–99.9)
PDW BLD-RTO: 12.4 FL (ref 11.5–15)
PLATELET # BLD: 354 E9/L (ref 130–450)
PMV BLD AUTO: 8.6 FL (ref 7–12)
POTASSIUM REFLEX MAGNESIUM: 4.5 MMOL/L (ref 3.5–5)
POTASSIUM SERPL-SCNC: 4.5 MMOL/L (ref 3.5–5)
PROTHROMBIN TIME: 12.8 SEC (ref 9.3–12.4)
RBC # BLD: 4.53 E12/L (ref 3.8–5.8)
SODIUM BLD-SCNC: 143 MMOL/L (ref 132–146)
WBC # BLD: 5.1 E9/L (ref 4.5–11.5)

## 2021-05-09 PROCEDURE — 6360000002 HC RX W HCPCS: Performed by: NURSE PRACTITIONER

## 2021-05-09 PROCEDURE — 85730 THROMBOPLASTIN TIME PARTIAL: CPT

## 2021-05-09 PROCEDURE — 85610 PROTHROMBIN TIME: CPT

## 2021-05-09 PROCEDURE — 36415 COLL VENOUS BLD VENIPUNCTURE: CPT

## 2021-05-09 PROCEDURE — 86923 COMPATIBILITY TEST ELECTRIC: CPT

## 2021-05-09 PROCEDURE — 6370000000 HC RX 637 (ALT 250 FOR IP): Performed by: INTERNAL MEDICINE

## 2021-05-09 PROCEDURE — 6370000000 HC RX 637 (ALT 250 FOR IP): Performed by: NURSE PRACTITIONER

## 2021-05-09 PROCEDURE — 86850 RBC ANTIBODY SCREEN: CPT

## 2021-05-09 PROCEDURE — 2580000003 HC RX 258: Performed by: INTERNAL MEDICINE

## 2021-05-09 PROCEDURE — 86900 BLOOD TYPING SEROLOGIC ABO: CPT

## 2021-05-09 PROCEDURE — 80048 BASIC METABOLIC PNL TOTAL CA: CPT

## 2021-05-09 PROCEDURE — 2060000000 HC ICU INTERMEDIATE R&B

## 2021-05-09 PROCEDURE — 85027 COMPLETE CBC AUTOMATED: CPT

## 2021-05-09 PROCEDURE — 99232 SBSQ HOSP IP/OBS MODERATE 35: CPT | Performed by: THORACIC SURGERY (CARDIOTHORACIC VASCULAR SURGERY)

## 2021-05-09 PROCEDURE — 86901 BLOOD TYPING SEROLOGIC RH(D): CPT

## 2021-05-09 RX ORDER — HEPARIN SODIUM 10000 [USP'U]/100ML
5-30 INJECTION, SOLUTION INTRAVENOUS CONTINUOUS
Status: DISCONTINUED | OUTPATIENT
Start: 2021-05-09 | End: 2021-05-10

## 2021-05-09 RX ORDER — CHLORHEXIDINE GLUCONATE 4 G/100ML
SOLUTION TOPICAL SEE ADMIN INSTRUCTIONS
Status: DISCONTINUED | OUTPATIENT
Start: 2021-05-09 | End: 2021-05-10

## 2021-05-09 RX ORDER — CHLORHEXIDINE GLUCONATE 0.12 MG/ML
15 RINSE ORAL ONCE
Status: COMPLETED | OUTPATIENT
Start: 2021-05-10 | End: 2021-05-10

## 2021-05-09 RX ADMIN — ISOSORBIDE MONONITRATE 30 MG: 30 TABLET ORAL at 09:24

## 2021-05-09 RX ADMIN — CHLORHEXIDINE GLUCONATE: 213 SOLUTION TOPICAL at 17:14

## 2021-05-09 RX ADMIN — ISOSORBIDE MONONITRATE 30 MG: 30 TABLET ORAL at 21:13

## 2021-05-09 RX ADMIN — RANOLAZINE 500 MG: 500 TABLET, FILM COATED, EXTENDED RELEASE ORAL at 09:24

## 2021-05-09 RX ADMIN — ATORVASTATIN CALCIUM 80 MG: 80 TABLET, FILM COATED ORAL at 21:13

## 2021-05-09 RX ADMIN — MUPIROCIN: 20 OINTMENT TOPICAL at 21:18

## 2021-05-09 RX ADMIN — ASPIRIN 81 MG CHEWABLE TABLET 81 MG: 81 TABLET CHEWABLE at 09:24

## 2021-05-09 RX ADMIN — HEPARIN SODIUM AND DEXTROSE 7.19 UNITS/KG/HR: 10000; 5 INJECTION INTRAVENOUS at 15:24

## 2021-05-09 RX ADMIN — AMLODIPINE BESYLATE 10 MG: 10 TABLET ORAL at 09:24

## 2021-05-09 RX ADMIN — Medication 10 ML: at 21:19

## 2021-05-09 RX ADMIN — RANOLAZINE 500 MG: 500 TABLET, FILM COATED, EXTENDED RELEASE ORAL at 21:13

## 2021-05-09 ASSESSMENT — PAIN SCALES - WONG BAKER: WONGBAKER_NUMERICALRESPONSE: 0

## 2021-05-09 NOTE — PLAN OF CARE
Problem: Cardiac Output - Decreased:  Goal: Hemodynamic stability will improve  Description: Hemodynamic stability will improve  Outcome: Met This Shift     Problem: Falls - Risk of:  Goal: Will remain free from falls  Description: Will remain free from falls  Outcome: Met This Shift     Problem: Falls - Risk of:  Goal: Absence of physical injury  Description: Absence of physical injury  Outcome: Met This Shift

## 2021-05-09 NOTE — PROGRESS NOTES
CC:CP    Brief HPI:  Awake, alert. No complaints. Past Medical History:   Diagnosis Date    CAD (coronary artery disease)     nstemi 5/2    Hyperlipidemia     Hypertension      Past Surgical History:   Procedure Laterality Date    CARDIAC CATHETERIZATION  05/03/2021    Dr. Vlad Bloom History    Marital status:      Spouse name: Not on file    Number of children: Not on file    Years of education: Not on file    Highest education level: Not on file   Occupational History    Not on file   Social Needs    Financial resource strain: Not on file    Food insecurity     Worry: Not on file     Inability: Not on file   [employer] needs     Medical: Not on file     Non-medical: Not on file   Tobacco Use    Smoking status: Current Every Day Smoker     Packs/day: 0.03     Types: Cigars    Smokeless tobacco: Never Used    Tobacco comment: 5 skinny cigars a day.    Substance and Sexual Activity    Alcohol use: No    Drug use: No    Sexual activity: Yes     Partners: Female   Lifestyle    Physical activity     Days per week: Not on file     Minutes per session: Not on file    Stress: Not on file   Relationships    Social connections     Talks on phone: Not on file     Gets together: Not on file     Attends Christianity service: Not on file     Active member of club or organization: Not on file     Attends meetings of clubs or organizations: Not on file     Relationship status: Not on file    Intimate partner violence     Fear of current or ex partner: Not on file     Emotionally abused: Not on file     Physically abused: Not on file     Forced sexual activity: Not on file   Other Topics Concern    Not on file   Social History Narrative    Not on file     Family History   Problem Relation Age of Onset    Diabetes Mother     Diabetes Father     High Blood Pressure Father     Stroke Father     Cancer Paternal Grandfather     No Known Problems Sister     High

## 2021-05-09 NOTE — PROGRESS NOTES
Hospitalist Progress Note      SYNOPSIS:      Patient admitted on 2021 for shortness of breath and chest pain.     26-year-old male with past medical history significant for hypertension came to our ER on  with chief complaint of chest pain and shortness of breath. He also complained of symptomatic bradycardia with heart rate of 40s on standing with some lightheadedness. In the ER his troponins were elevated to 0.53 and lactic acid of 2.3. Chest x-ray was concerning for pneumonia with left greater than right parenchymal infiltrates. He was also in hypertensive urgency and was started on nitroglycerin with complete resolution of his chest pain and improvement in his blood pressure. Patient was also started on IV heparin for non-ST segment elevation MI. Patient underwent cardiac cath on 5/3 and was found to have severe multivessel disease but called for cardiothoracic evaluation. Evaluated by cardiothoracic surgeon with a plan to undergo CABG during this admission. SUBJECTIVE:    Patient seen and examined  Records reviewed. Stable overnight. No other overnight issues reported. Temp (24hrs), Av.6 °F (37 °C), Min:98.4 °F (36.9 °C), Max:98.7 °F (37.1 °C)    DIET: DIET CARDIAC;  CODE: Full Code    Intake/Output Summary (Last 24 hours) at 2021 0802  Last data filed at 2021 0514  Gross per 24 hour   Intake 2444 ml   Output 1200 ml   Net 1244 ml       OBJECTIVE:    /73   Pulse 57   Temp 98.4 °F (36.9 °C) (Temporal)   Resp 18   Ht 5' 11\" (1.803 m)   Wt 217 lb 9.6 oz (98.7 kg)   SpO2 96%   BMI 30.35 kg/m²     General appearance: No apparent distress, appears stated age and cooperative. HEENT:  Conjunctivae/corneas clear. Neck: Supple. No jugular venous distention.    Respiratory: Clear to auscultation bilaterally, normal respiratory effort  Cardiovascular: Regular rate rhythm, normal S1-S2  Abdomen: Soft, nontender, nondistended  Musculoskeletal: No clubbing, cyanosis, no bilateral lower extremity edema. Brisk capillary refill.    Skin:  No rashes  on visible skin  Neurologic: awake, alert and following commands     ASSESSMENT & PLAN:    Non-ST segment elevation MI  Continue heparin  Cardiac cath 5/3: Showed severe multivessel coronary artery disease  For CABG on Monday  Ranexa 500 mg p.o. twice daily    Elevated creatinine  Unclear etiology  Admitted with creatinine 1.0, today 1.3  With IV fluids at 75 cc/h, creatinine came down to 1.1 on 5/9, IV fluids discontinued on 5/9  Hold lisinopril for now, restart after surgery     Symptomatic bradycardia  Monitor for now  Improved     Lactic acidosis resolved     Hypertension  Presented with hypertensive urgency  Currently off IV drugs  Blood pressure well controlled with Norvasc 10, Imdur 30 mg p.o. twice daily, lisinopril 40 mg p.o. daily     Parenchymal infiltrate  Likely from fluid overload  Procalcitonin 0.08  Monitor off antibiotics     Tobacco abuse  Cessation education     Hyperlipidemia  Continue statins     Hypoglycemia  Hemoglobin A1c 5.8  Recommend dietary modifications and exercise     DVT prophylaxis  Subcu heparin       DISPOSITION:     Medications:  REVIEWED DAILY    Infusion Medications    sodium chloride 75 mL/hr at 05/08/21 1150    sodium chloride      sodium chloride      heparin (PORCINE) Infusion 7.1 Units/kg/hr (05/08/21 0814)     Scheduled Medications    isosorbide mononitrate  30 mg Oral BID    sodium chloride flush  5-40 mL Intravenous 2 times per day    ranolazine  500 mg Oral BID    nitroGLYCERIN  0.4 mg Sublingual Once    aspirin  81 mg Oral Daily    atorvastatin  80 mg Oral Nightly    amLODIPine  10 mg Oral Daily     PRN Meds: sodium chloride flush, sodium chloride, hydrALAZINE, promethazine **OR** ondansetron, acetaminophen **OR** acetaminophen, polyethylene glycol, perflutren lipid microspheres, heparin (porcine), heparin (porcine)    Labs:     Recent Labs     05/07/21  0651 05/08/21  5618 05/09/21  0633   WBC 5.3 5.5 5.1   HGB 15.6 15.3 14.9   HCT 46.7 45.2 44.1    344 354       Recent Labs     05/07/21  0651 05/08/21  0611 05/09/21  0633    140 143   K 4.7 4.7  4.7 4.5  4.5    104 108*   CO2 26 28 29   BUN 16 20 15   CREATININE 1.2 1.3* 1.1   CALCIUM 9.8 9.8 9.1       No results for input(s): PROT, ALB, ALKPHOS, ALT, AST, BILITOT, AMYLASE, LIPASE in the last 72 hours. Recent Labs     05/09/21 0633   INR 1.2       No results for input(s): Adolfo Argueta in the last 72 hours. Chronic labs:    Lab Results   Component Value Date    CHOL 230 (H) 05/02/2021    TRIG 80 05/02/2021    HDL 37 05/02/2021    LDLCALC 177 (H) 05/02/2021    TSH 0.489 05/03/2021    INR 1.2 05/09/2021    LABA1C 5.8 (H) 05/02/2021       Radiology: REVIEWED DAILY    +++++++++++++++++++++++++++++++++++++++++++++++++  Mario Dias 68, [place]  +++++++++++++++++++++++++++++++++++++++++++++++++  NOTE: This report was transcribed using voice recognition software. Every effort was made to ensure accuracy; however, inadvertent computerized transcription errors may be present.

## 2021-05-09 NOTE — PROGRESS NOTES
Comprehensive Nutrition Assessment    Type and Reason for Visit:  Initial(LOS)    Nutrition Recommendations/Plan: Recommend and start (following CABG) Ensure High Protein TID and Misbah wound healing supplement BID to help meet increased nutritional needs from surgical wound healing. Nutrition Assessment:  Patients po intake has been adequate, averaging ~75% of most meals served ; adm w/ NSTEMI ; s/p cardiac cath 5/3 ; planned CABG on 5/10 ; hx of CAD ; will start ONS following surgery    Malnutrition Assessment:  Malnutrition Status:  No malnutrition    Context:  Acute Illness     Findings of the 6 clinical characteristics of malnutrition:  Energy Intake:  No significant decrease in energy intake  Weight Loss:  No significant weight loss     Body Fat Loss:  No significant body fat loss     Muscle Mass Loss:  No significant muscle mass loss    Fluid Accumulation:  No significant fluid accumulation     Strength:  Not Performed    Estimated Daily Nutrient Needs:  Energy (kcal):  9049-7740 (REE 1858 x 1.2 SF); Weight Used for Energy Requirements:  Current     Protein (g):  115-140 (1.5-1.8g/kg IBW); Weight Used for Protein Requirements:  Ideal        Fluid (ml/day):  9742-9353; Method Used for Fluid Requirements:  1 ml/kcal      Nutrition Related Findings:  I&Os WNL, 1-2+ edema, active BS, rounded abd, missing teeth, A&O x 4, puncture site, obesity, SOB      Wounds:  None       Current Nutrition Therapies:    DIET CARDIAC; Diet NPO Time Specified Exceptions are: Sips of Water with Meds    Anthropometric Measures:  · Height: 5' 11\" (180.3 cm)  · Current Body Weight: 217 lb (98.4 kg)(5/9, standing scale)   · Admission Body Weight: 218 lb (98.9 kg)(5/3, bedscale)    · Usual Body Weight: (UTO ; no weights available in EMR hx from previous encounters)     · Ideal Body Weight: 172 lbs; % Ideal Body Weight 126.2 %   · BMI: 30.3  · BMI Categories: Obese Class 1 (BMI 30.0-34. 9)       Nutrition Diagnosis: · Increased nutrient needs related to increase demand for energy/nutrients as evidenced by wounds(surgical)      Nutrition Interventions:   Food and/or Nutrient Delivery:  Continue Current Diet  Nutrition Education/Counseling:  Education completed   Coordination of Nutrition Care:  Continue to monitor while inpatient    Goals:  Patient will consume ~75% of meals served       Nutrition Monitoring and Evaluation:   Behavioral-Environmental Outcomes:  Beliefs and Attitutes   Food/Nutrient Intake Outcomes:  Food and Nutrient Intake, Supplement Intake  Physical Signs/Symptoms Outcomes:  Biochemical Data, Chewing or Swallowing, GI Status, Hemodynamic Status, Fluid Status or Edema, Meal Time Behavior, Nutrition Focused Physical Findings, Skin, Weight     Discharge Planning:     Too soon to determine     Electronically signed by Kristine Hudson RD, LD on 5/9/21 at 10:17 AM EDT    Contact: [phone]

## 2021-05-10 ENCOUNTER — APPOINTMENT (OUTPATIENT)
Dept: GENERAL RADIOLOGY | Age: 51
DRG: 233 | End: 2021-05-10

## 2021-05-10 ENCOUNTER — ANESTHESIA (OUTPATIENT)
Dept: OPERATING ROOM | Age: 51
DRG: 233 | End: 2021-05-10

## 2021-05-10 VITALS — OXYGEN SATURATION: 100 % | TEMPERATURE: 98.2 F

## 2021-05-10 LAB
AADO2: 126.7 MMHG
AADO2: 140.2 MMHG
AADO2: 193.5 MMHG
ACTIVATED CLOTTING TIME: 106 SECONDS (ref 99–130)
ACTIVATED CLOTTING TIME: 121 SECONDS (ref 99–130)
ACTIVATED CLOTTING TIME: 468 SECONDS (ref 99–130)
ACTIVATED CLOTTING TIME: 475 SECONDS (ref 99–130)
ACTIVATED CLOTTING TIME: 537 SECONDS (ref 99–130)
ANION GAP SERPL CALCULATED.3IONS-SCNC: 12 MMOL/L (ref 7–16)
ANION GAP SERPL CALCULATED.3IONS-SCNC: 6 MMOL/L (ref 7–16)
APTT: 39.3 SEC (ref 24.5–35.1)
APTT: 50.7 SEC (ref 24.5–35.1)
B.E.: -0.8 MMOL/L (ref -3–0)
B.E.: -1.2 MMOL/L (ref -3–0)
B.E.: -1.8 MMOL/L (ref -3–3)
B.E.: -3.9 MMOL/L (ref -3–0)
B.E.: -4.4 MMOL/L (ref -3–3)
B.E.: -4.5 MMOL/L (ref -3–3)
BUN BLDV-MCNC: 15 MG/DL (ref 6–20)
BUN BLDV-MCNC: 16 MG/DL (ref 6–20)
CALCIUM SERPL-MCNC: 10 MG/DL (ref 8.6–10.2)
CALCIUM SERPL-MCNC: 8.3 MG/DL (ref 8.6–10.2)
CARDIOPULMONARY BYPASS: NO
CARDIOPULMONARY BYPASS: YES
CARDIOPULMONARY BYPASS: YES
CHLORIDE BLD-SCNC: 104 MMOL/L (ref 98–107)
CHLORIDE BLD-SCNC: 106 MMOL/L (ref 98–107)
CO2: 23 MMOL/L (ref 22–29)
CO2: 26 MMOL/L (ref 22–29)
COHB: 0.5 % (ref 0–1.5)
COHB: 0.6 % (ref 0–1.5)
COHB: 0.8 % (ref 0–1.5)
CREAT SERPL-MCNC: 1.1 MG/DL (ref 0.7–1.2)
CREAT SERPL-MCNC: 1.4 MG/DL (ref 0.7–1.2)
CRITICAL: ABNORMAL
DATE ANALYZED: ABNORMAL
DATE OF COLLECTION: ABNORMAL
DEVICE: ABNORMAL
FIO2: 40 %
FIO2: 40 %
FIO2: 50 %
GFR AFRICAN AMERICAN: >60
GFR AFRICAN AMERICAN: >60
GFR NON-AFRICAN AMERICAN: >60 ML/MIN/1.73
GFR NON-AFRICAN AMERICAN: >60 ML/MIN/1.73
GLUCOSE BLD-MCNC: 103 MG/DL (ref 74–99)
GLUCOSE BLD-MCNC: 123 MG/DL (ref 74–99)
HCO3 ARTERIAL: 22.3 MMOL/L (ref 22–26)
HCO3 ARTERIAL: 23.6 MMOL/L (ref 22–26)
HCO3 ARTERIAL: 24.5 MMOL/L (ref 22–26)
HCO3: 21.7 MMOL/L (ref 22–26)
HCO3: 22.8 MMOL/L (ref 22–26)
HCO3: 25 MMOL/L (ref 22–26)
HCT (EST): 27 % (ref 37–54)
HCT (EST): 30 % (ref 37–54)
HCT (EST): 31 % (ref 37–54)
HCT VFR BLD CALC: 34.2 % (ref 37–54)
HCT VFR BLD CALC: 47.4 % (ref 37–54)
HEMOGLOBIN: 11.2 G/DL (ref 12.5–16.5)
HEMOGLOBIN: 15.7 G/DL (ref 12.5–16.5)
HGB, (EST): 10.2 G/DL (ref 12.5–15.5)
HGB, (EST): 10.5 G/DL (ref 12.5–15.5)
HGB, (EST): 9.1 G/DL (ref 12.5–15.5)
HHB: 3.2 % (ref 0–5)
HHB: 4.3 % (ref 0–5)
HHB: 6.4 % (ref 0–5)
INR BLD: 1.4
LAB: ABNORMAL
MAGNESIUM: 2.6 MG/DL (ref 1.6–2.6)
MCH RBC QN AUTO: 32.4 PG (ref 26–35)
MCH RBC QN AUTO: 32.5 PG (ref 26–35)
MCHC RBC AUTO-ENTMCNC: 32.7 % (ref 32–34.5)
MCHC RBC AUTO-ENTMCNC: 33.1 % (ref 32–34.5)
MCV RBC AUTO: 97.7 FL (ref 80–99.9)
MCV RBC AUTO: 99.1 FL (ref 80–99.9)
METER GLUCOSE: 126 MG/DL (ref 74–99)
METER GLUCOSE: 135 MG/DL (ref 74–99)
METER GLUCOSE: 138 MG/DL (ref 74–99)
METER GLUCOSE: 151 MG/DL (ref 74–99)
METER GLUCOSE: 179 MG/DL (ref 74–99)
METER GLUCOSE: 98 MG/DL (ref 74–99)
METHB: 0.2 % (ref 0–1.5)
METHB: 0.3 % (ref 0–1.5)
METHB: 0.4 % (ref 0–1.5)
MODE: ABNORMAL
MODE: ABNORMAL
MODE: AC
O2 CONTENT: 17.5 ML/DL
O2 CONTENT: 17.8 ML/DL
O2 SATURATION: 100 % (ref 92–98.5)
O2 SATURATION: 93.5 % (ref 92–98.5)
O2 SATURATION: 94.1 % (ref 92–98.5)
O2 SATURATION: 95.7 % (ref 92–98.5)
O2 SATURATION: 96.8 % (ref 92–98.5)
O2 SATURATION: 99.9 % (ref 92–98.5)
O2HB: 92.4 % (ref 94–97)
O2HB: 95 % (ref 94–97)
O2HB: 95.9 % (ref 94–97)
OPERATOR ID: 882
OPERATOR ID: ABNORMAL
PATIENT TEMP: 37 C
PCO2 ARTERIAL: 36.9 MMHG (ref 35–45)
PCO2 ARTERIAL: 44.4 MMHG (ref 35–45)
PCO2 ARTERIAL: 44.6 MMHG (ref 35–45)
PCO2: 43.6 MMHG (ref 35–45)
PCO2: 50.8 MMHG (ref 35–45)
PCO2: 51.1 MMHG (ref 35–45)
PDW BLD-RTO: 12.5 FL (ref 11.5–15)
PDW BLD-RTO: 12.6 FL (ref 11.5–15)
PEEP/CPAP: 5 CMH2O
PEEP/CPAP: 5 CMH2O
PEEP/CPAP: 8 CMH2O
PFO2: 1.86 MMHG/%
PFO2: 1.91 MMHG/%
PFO2: 2.46 MMHG/%
PH BLOOD GAS: 7.27 (ref 7.35–7.45)
PH BLOOD GAS: 7.31 (ref 7.35–7.45)
PH BLOOD GAS: 7.35 (ref 7.35–7.45)
PH BLOOD GAS: 7.41 (ref 7.35–7.45)
PLATELET # BLD: 199 E9/L (ref 130–450)
PLATELET # BLD: 404 E9/L (ref 130–450)
PMV BLD AUTO: 8.9 FL (ref 7–12)
PMV BLD AUTO: 9.2 FL (ref 7–12)
PO2 ARTERIAL: 325.5 MMHG (ref 80–100)
PO2 ARTERIAL: 552.7 MMHG (ref 80–100)
PO2 ARTERIAL: 78.5 MMHG (ref 80–100)
PO2: 76.6 MMHG (ref 75–100)
PO2: 93 MMHG (ref 75–100)
PO2: 98.4 MMHG (ref 75–100)
POTASSIUM REFLEX MAGNESIUM: 4.3 MMOL/L (ref 3.5–5)
POTASSIUM SERPL-SCNC: 3.8 MMOL/L (ref 3.5–5.5)
POTASSIUM SERPL-SCNC: 4.3 MMOL/L (ref 3.5–5)
POTASSIUM SERPL-SCNC: 4.4 MMOL/L (ref 3.5–5)
POTASSIUM SERPL-SCNC: 4.58 MMOL/L (ref 3.5–5)
POTASSIUM SERPL-SCNC: 5.2 MMOL/L (ref 3.5–5.5)
POTASSIUM SERPL-SCNC: 5.6 MMOL/L (ref 3.5–5.5)
PROTHROMBIN TIME: 15 SEC (ref 9.3–12.4)
PS: 8 CMH20
RBC # BLD: 3.45 E12/L (ref 3.8–5.8)
RBC # BLD: 4.85 E12/L (ref 3.8–5.8)
RI(T): 129 %
RI(T): 183 %
RI(T): 2.08
RR MECHANICAL: 12 B/MIN
RR MECHANICAL: 14 B/MIN
SODIUM BLD-SCNC: 138 MMOL/L (ref 132–146)
SODIUM BLD-SCNC: 139 MMOL/L (ref 132–146)
SOURCE, BLOOD GAS: ABNORMAL
THB: 12.6 G/DL (ref 11.5–16.5)
THB: 13.1 G/DL (ref 11.5–16.5)
THB: 13.4 G/DL (ref 11.5–16.5)
TIME ANALYZED: 1752
TIME ANALYZED: 1849
TIME ANALYZED: 2020
VT MECHANICAL: 500 ML
VT MECHANICAL: 550 ML
WBC # BLD: 12.6 E9/L (ref 4.5–11.5)
WBC # BLD: 7 E9/L (ref 4.5–11.5)

## 2021-05-10 PROCEDURE — 2000000000 HC ICU R&B

## 2021-05-10 PROCEDURE — 6360000002 HC RX W HCPCS: Performed by: NURSE PRACTITIONER

## 2021-05-10 PROCEDURE — 85610 PROTHROMBIN TIME: CPT

## 2021-05-10 PROCEDURE — 2580000003 HC RX 258: Performed by: INTERNAL MEDICINE

## 2021-05-10 PROCEDURE — 6360000002 HC RX W HCPCS: Performed by: THORACIC SURGERY (CARDIOTHORACIC VASCULAR SURGERY)

## 2021-05-10 PROCEDURE — 06BY4ZZ EXCISION OF LOWER VEIN, PERCUTANEOUS ENDOSCOPIC APPROACH: ICD-10-PCS | Performed by: THORACIC SURGERY (CARDIOTHORACIC VASCULAR SURGERY)

## 2021-05-10 PROCEDURE — 33518 CABG ARTERY-VEIN TWO: CPT | Performed by: THORACIC SURGERY (CARDIOTHORACIC VASCULAR SURGERY)

## 2021-05-10 PROCEDURE — 82805 BLOOD GASES W/O2 SATURATION: CPT

## 2021-05-10 PROCEDURE — 3700000001 HC ADD 15 MINUTES (ANESTHESIA): Performed by: THORACIC SURGERY (CARDIOTHORACIC VASCULAR SURGERY)

## 2021-05-10 PROCEDURE — 71045 X-RAY EXAM CHEST 1 VIEW: CPT

## 2021-05-10 PROCEDURE — 2580000003 HC RX 258

## 2021-05-10 PROCEDURE — 3600000008 HC SURGERY OHS BASE: Performed by: THORACIC SURGERY (CARDIOTHORACIC VASCULAR SURGERY)

## 2021-05-10 PROCEDURE — 021109W BYPASS CORONARY ARTERY, TWO ARTERIES FROM AORTA WITH AUTOLOGOUS VENOUS TISSUE, OPEN APPROACH: ICD-10-PCS | Performed by: THORACIC SURGERY (CARDIOTHORACIC VASCULAR SURGERY)

## 2021-05-10 PROCEDURE — 94002 VENT MGMT INPAT INIT DAY: CPT

## 2021-05-10 PROCEDURE — 6360000002 HC RX W HCPCS: Performed by: ANESTHESIOLOGY

## 2021-05-10 PROCEDURE — 7100000001 HC PACU RECOVERY - ADDTL 15 MIN

## 2021-05-10 PROCEDURE — 6360000002 HC RX W HCPCS: Performed by: NURSE ANESTHETIST, CERTIFIED REGISTERED

## 2021-05-10 PROCEDURE — 85027 COMPLETE CBC AUTOMATED: CPT

## 2021-05-10 PROCEDURE — 88304 TISSUE EXAM BY PATHOLOGIST: CPT

## 2021-05-10 PROCEDURE — 6370000000 HC RX 637 (ALT 250 FOR IP): Performed by: THORACIC SURGERY (CARDIOTHORACIC VASCULAR SURGERY)

## 2021-05-10 PROCEDURE — C9113 INJ PANTOPRAZOLE SODIUM, VIA: HCPCS | Performed by: THORACIC SURGERY (CARDIOTHORACIC VASCULAR SURGERY)

## 2021-05-10 PROCEDURE — 6370000000 HC RX 637 (ALT 250 FOR IP)

## 2021-05-10 PROCEDURE — 80048 BASIC METABOLIC PNL TOTAL CA: CPT

## 2021-05-10 PROCEDURE — 37799 UNLISTED PX VASCULAR SURGERY: CPT

## 2021-05-10 PROCEDURE — 6360000002 HC RX W HCPCS

## 2021-05-10 PROCEDURE — 6370000000 HC RX 637 (ALT 250 FOR IP): Performed by: NURSE PRACTITIONER

## 2021-05-10 PROCEDURE — 2580000003 HC RX 258: Performed by: THORACIC SURGERY (CARDIOTHORACIC VASCULAR SURGERY)

## 2021-05-10 PROCEDURE — 83735 ASSAY OF MAGNESIUM: CPT

## 2021-05-10 PROCEDURE — P9041 ALBUMIN (HUMAN),5%, 50ML: HCPCS

## 2021-05-10 PROCEDURE — 85730 THROMBOPLASTIN TIME PARTIAL: CPT

## 2021-05-10 PROCEDURE — 33533 CABG ARTERIAL SINGLE: CPT | Performed by: THORACIC SURGERY (CARDIOTHORACIC VASCULAR SURGERY)

## 2021-05-10 PROCEDURE — 3700000000 HC ANESTHESIA ATTENDED CARE: Performed by: THORACIC SURGERY (CARDIOTHORACIC VASCULAR SURGERY)

## 2021-05-10 PROCEDURE — 02100Z9 BYPASS CORONARY ARTERY, ONE ARTERY FROM LEFT INTERNAL MAMMARY, OPEN APPROACH: ICD-10-PCS | Performed by: THORACIC SURGERY (CARDIOTHORACIC VASCULAR SURGERY)

## 2021-05-10 PROCEDURE — 99232 SBSQ HOSP IP/OBS MODERATE 35: CPT | Performed by: NURSE PRACTITIONER

## 2021-05-10 PROCEDURE — C1713 ANCHOR/SCREW BN/BN,TIS/BN: HCPCS | Performed by: THORACIC SURGERY (CARDIOTHORACIC VASCULAR SURGERY)

## 2021-05-10 PROCEDURE — 3600000018 HC SURGERY OHS ADDTL 15MIN: Performed by: THORACIC SURGERY (CARDIOTHORACIC VASCULAR SURGERY)

## 2021-05-10 PROCEDURE — 82803 BLOOD GASES ANY COMBINATION: CPT

## 2021-05-10 PROCEDURE — 2500000003 HC RX 250 WO HCPCS: Performed by: NURSE ANESTHETIST, CERTIFIED REGISTERED

## 2021-05-10 PROCEDURE — 99999 PR OFFICE/OUTPT VISIT,PROCEDURE ONLY: CPT | Performed by: PHYSICIAN ASSISTANT

## 2021-05-10 PROCEDURE — 2580000003 HC RX 258: Performed by: NURSE ANESTHETIST, CERTIFIED REGISTERED

## 2021-05-10 PROCEDURE — 94660 CPAP INITIATION&MGMT: CPT

## 2021-05-10 PROCEDURE — 6370000000 HC RX 637 (ALT 250 FOR IP): Performed by: INTERNAL MEDICINE

## 2021-05-10 PROCEDURE — 82962 GLUCOSE BLOOD TEST: CPT

## 2021-05-10 PROCEDURE — 36415 COLL VENOUS BLD VENIPUNCTURE: CPT

## 2021-05-10 PROCEDURE — P9045 ALBUMIN (HUMAN), 5%, 250 ML: HCPCS | Performed by: THORACIC SURGERY (CARDIOTHORACIC VASCULAR SURGERY)

## 2021-05-10 PROCEDURE — 2720000010 HC SURG SUPPLY STERILE: Performed by: THORACIC SURGERY (CARDIOTHORACIC VASCULAR SURGERY)

## 2021-05-10 PROCEDURE — B24BZZ4 ULTRASONOGRAPHY OF HEART WITH AORTA, TRANSESOPHAGEAL: ICD-10-PCS | Performed by: ANESTHESIOLOGY

## 2021-05-10 PROCEDURE — 02HV33Z INSERTION OF INFUSION DEVICE INTO SUPERIOR VENA CAVA, PERCUTANEOUS APPROACH: ICD-10-PCS | Performed by: ANESTHESIOLOGY

## 2021-05-10 PROCEDURE — 94640 AIRWAY INHALATION TREATMENT: CPT

## 2021-05-10 PROCEDURE — 7100000000 HC PACU RECOVERY - FIRST 15 MIN

## 2021-05-10 PROCEDURE — 5A1935Z RESPIRATORY VENTILATION, LESS THAN 24 CONSECUTIVE HOURS: ICD-10-PCS | Performed by: INTERNAL MEDICINE

## 2021-05-10 PROCEDURE — 33572 OPEN CORONARY ENDARTERECTOMY: CPT | Performed by: THORACIC SURGERY (CARDIOTHORACIC VASCULAR SURGERY)

## 2021-05-10 PROCEDURE — 2500000003 HC RX 250 WO HCPCS: Performed by: THORACIC SURGERY (CARDIOTHORACIC VASCULAR SURGERY)

## 2021-05-10 PROCEDURE — C1729 CATH, DRAINAGE: HCPCS | Performed by: THORACIC SURGERY (CARDIOTHORACIC VASCULAR SURGERY)

## 2021-05-10 PROCEDURE — 2500000003 HC RX 250 WO HCPCS

## 2021-05-10 PROCEDURE — 5A1221Z PERFORMANCE OF CARDIAC OUTPUT, CONTINUOUS: ICD-10-PCS | Performed by: THORACIC SURGERY (CARDIOTHORACIC VASCULAR SURGERY)

## 2021-05-10 PROCEDURE — 33508 ENDOSCOPIC VEIN HARVEST: CPT | Performed by: THORACIC SURGERY (CARDIOTHORACIC VASCULAR SURGERY)

## 2021-05-10 PROCEDURE — 84132 ASSAY OF SERUM POTASSIUM: CPT

## 2021-05-10 PROCEDURE — 85347 COAGULATION TIME ACTIVATED: CPT

## 2021-05-10 PROCEDURE — 2709999900 HC NON-CHARGEABLE SUPPLY: Performed by: THORACIC SURGERY (CARDIOTHORACIC VASCULAR SURGERY)

## 2021-05-10 DEVICE — SCREW BNE L13MM DIA2.3MM THOR STRNL TI LOK DRL FREE LEV 1: Type: IMPLANTABLE DEVICE | Site: STERNUM | Status: FUNCTIONAL

## 2021-05-10 DEVICE — PLATE LCK STRNL 8-H LAD T 1.8MM: Type: IMPLANTABLE DEVICE | Site: STERNUM | Status: FUNCTIONAL

## 2021-05-10 DEVICE — PLATE BONE 1.8MM THICKNESS STRNL LCK 6 H CP TI: Type: IMPLANTABLE DEVICE | Site: STERNUM | Status: FUNCTIONAL

## 2021-05-10 DEVICE — SCREW BNE L11MM DIA2.3MM THOR STRNL TI LOK DRL FREE LEV 1: Type: IMPLANTABLE DEVICE | Site: STERNUM | Status: FUNCTIONAL

## 2021-05-10 RX ORDER — ACETAMINOPHEN 650 MG/1
650 SUPPOSITORY RECTAL EVERY 4 HOURS PRN
Status: DISCONTINUED | OUTPATIENT
Start: 2021-05-10 | End: 2021-05-11

## 2021-05-10 RX ORDER — ALBUMIN, HUMAN INJ 5% 5 %
SOLUTION INTRAVENOUS PRN
Status: DISCONTINUED | OUTPATIENT
Start: 2021-05-10 | End: 2021-05-10 | Stop reason: SDUPTHER

## 2021-05-10 RX ORDER — OXYCODONE HYDROCHLORIDE 10 MG/1
10 TABLET ORAL EVERY 4 HOURS PRN
Status: DISCONTINUED | OUTPATIENT
Start: 2021-05-10 | End: 2021-05-11

## 2021-05-10 RX ORDER — ASPIRIN 300 MG/1
300 SUPPOSITORY RECTAL ONCE
Status: COMPLETED | OUTPATIENT
Start: 2021-05-10 | End: 2021-05-10

## 2021-05-10 RX ORDER — 0.9 % SODIUM CHLORIDE 0.9 %
250 INTRAVENOUS SOLUTION INTRAVENOUS CONTINUOUS PRN
Status: DISCONTINUED | OUTPATIENT
Start: 2021-05-10 | End: 2021-05-11

## 2021-05-10 RX ORDER — DEXTROSE MONOHYDRATE 25 G/50ML
12.5 INJECTION, SOLUTION INTRAVENOUS PRN
Status: DISCONTINUED | OUTPATIENT
Start: 2021-05-10 | End: 2021-05-14 | Stop reason: HOSPADM

## 2021-05-10 RX ORDER — FENTANYL CITRATE 50 UG/ML
50 INJECTION, SOLUTION INTRAMUSCULAR; INTRAVENOUS
Status: DISCONTINUED | OUTPATIENT
Start: 2021-05-10 | End: 2021-05-11

## 2021-05-10 RX ORDER — PANTOPRAZOLE SODIUM 40 MG/10ML
40 INJECTION, POWDER, LYOPHILIZED, FOR SOLUTION INTRAVENOUS DAILY
Status: COMPLETED | OUTPATIENT
Start: 2021-05-10 | End: 2021-05-10

## 2021-05-10 RX ORDER — CLOPIDOGREL BISULFATE 75 MG/1
75 TABLET ORAL DAILY
Status: DISCONTINUED | OUTPATIENT
Start: 2021-05-11 | End: 2021-05-14 | Stop reason: HOSPADM

## 2021-05-10 RX ORDER — ALBUMIN, HUMAN INJ 5% 5 %
25 SOLUTION INTRAVENOUS PRN
Status: DISCONTINUED | OUTPATIENT
Start: 2021-05-10 | End: 2021-05-11

## 2021-05-10 RX ORDER — IPRATROPIUM BROMIDE AND ALBUTEROL SULFATE 2.5; .5 MG/3ML; MG/3ML
1 SOLUTION RESPIRATORY (INHALATION)
Status: DISCONTINUED | OUTPATIENT
Start: 2021-05-10 | End: 2021-05-14 | Stop reason: HOSPADM

## 2021-05-10 RX ORDER — SODIUM CHLORIDE 0.9 % (FLUSH) 0.9 %
10 SYRINGE (ML) INJECTION PRN
Status: DISCONTINUED | OUTPATIENT
Start: 2021-05-10 | End: 2021-05-14 | Stop reason: HOSPADM

## 2021-05-10 RX ORDER — PROTAMINE SULFATE 10 MG/ML
INJECTION, SOLUTION INTRAVENOUS PRN
Status: DISCONTINUED | OUTPATIENT
Start: 2021-05-10 | End: 2021-05-10 | Stop reason: SDUPTHER

## 2021-05-10 RX ORDER — PROPOFOL 10 MG/ML
INJECTION, EMULSION INTRAVENOUS PRN
Status: DISCONTINUED | OUTPATIENT
Start: 2021-05-10 | End: 2021-05-10 | Stop reason: SDUPTHER

## 2021-05-10 RX ORDER — FENTANYL CITRATE 50 UG/ML
25 INJECTION, SOLUTION INTRAMUSCULAR; INTRAVENOUS
Status: DISCONTINUED | OUTPATIENT
Start: 2021-05-10 | End: 2021-05-11

## 2021-05-10 RX ORDER — ACETAMINOPHEN 325 MG/1
650 TABLET ORAL EVERY 4 HOURS PRN
Status: DISCONTINUED | OUTPATIENT
Start: 2021-05-10 | End: 2021-05-11

## 2021-05-10 RX ORDER — PANTOPRAZOLE SODIUM 40 MG/1
40 TABLET, DELAYED RELEASE ORAL DAILY
Status: DISCONTINUED | OUTPATIENT
Start: 2021-05-11 | End: 2021-05-11

## 2021-05-10 RX ORDER — CEFAZOLIN SODIUM 1 G/3ML
INJECTION, POWDER, FOR SOLUTION INTRAMUSCULAR; INTRAVENOUS PRN
Status: DISCONTINUED | OUTPATIENT
Start: 2021-05-10 | End: 2021-05-10 | Stop reason: SDUPTHER

## 2021-05-10 RX ORDER — MIDAZOLAM HYDROCHLORIDE 1 MG/ML
2 INJECTION INTRAMUSCULAR; INTRAVENOUS ONCE
Status: COMPLETED | OUTPATIENT
Start: 2021-05-10 | End: 2021-05-10

## 2021-05-10 RX ORDER — SODIUM CHLORIDE 0.9 % (FLUSH) 0.9 %
10 SYRINGE (ML) INJECTION EVERY 12 HOURS SCHEDULED
Status: DISCONTINUED | OUTPATIENT
Start: 2021-05-10 | End: 2021-05-14 | Stop reason: HOSPADM

## 2021-05-10 RX ORDER — DEXTROSE MONOHYDRATE 50 MG/ML
100 INJECTION, SOLUTION INTRAVENOUS PRN
Status: DISCONTINUED | OUTPATIENT
Start: 2021-05-10 | End: 2021-05-14 | Stop reason: HOSPADM

## 2021-05-10 RX ORDER — SODIUM CHLORIDE 9 MG/ML
30 INJECTION, SOLUTION INTRAVENOUS CONTINUOUS
Status: DISCONTINUED | OUTPATIENT
Start: 2021-05-10 | End: 2021-05-11

## 2021-05-10 RX ORDER — MAGNESIUM SULFATE IN WATER 40 MG/ML
2000 INJECTION, SOLUTION INTRAVENOUS PRN
Status: DISCONTINUED | OUTPATIENT
Start: 2021-05-10 | End: 2021-05-11

## 2021-05-10 RX ORDER — ASPIRIN 81 MG/1
81 TABLET ORAL DAILY
Status: DISCONTINUED | OUTPATIENT
Start: 2021-05-11 | End: 2021-05-11

## 2021-05-10 RX ORDER — PHENYLEPHRINE HYDROCHLORIDE 10 MG/ML
INJECTION INTRAVENOUS PRN
Status: DISCONTINUED | OUTPATIENT
Start: 2021-05-10 | End: 2021-05-10 | Stop reason: SDUPTHER

## 2021-05-10 RX ORDER — SODIUM CHLORIDE, SODIUM LACTATE, POTASSIUM CHLORIDE, CALCIUM CHLORIDE 600; 310; 30; 20 MG/100ML; MG/100ML; MG/100ML; MG/100ML
INJECTION, SOLUTION INTRAVENOUS CONTINUOUS PRN
Status: DISCONTINUED | OUTPATIENT
Start: 2021-05-10 | End: 2021-05-10 | Stop reason: SDUPTHER

## 2021-05-10 RX ORDER — INSULIN GLARGINE 100 [IU]/ML
0.15 INJECTION, SOLUTION SUBCUTANEOUS NIGHTLY
Status: DISCONTINUED | OUTPATIENT
Start: 2021-05-11 | End: 2021-05-10

## 2021-05-10 RX ORDER — TAMSULOSIN HYDROCHLORIDE 0.4 MG/1
0.4 CAPSULE ORAL DAILY
Status: DISCONTINUED | OUTPATIENT
Start: 2021-05-11 | End: 2021-05-13

## 2021-05-10 RX ORDER — OXYCODONE HYDROCHLORIDE 5 MG/1
5 TABLET ORAL EVERY 4 HOURS PRN
Status: DISCONTINUED | OUTPATIENT
Start: 2021-05-10 | End: 2021-05-11

## 2021-05-10 RX ORDER — PROPOFOL 10 MG/ML
INJECTION, EMULSION INTRAVENOUS
Status: DISCONTINUED
Start: 2021-05-10 | End: 2021-05-10

## 2021-05-10 RX ORDER — ATORVASTATIN CALCIUM 40 MG/1
40 TABLET, FILM COATED ORAL NIGHTLY
Status: DISCONTINUED | OUTPATIENT
Start: 2021-05-10 | End: 2021-05-11

## 2021-05-10 RX ORDER — MEPERIDINE HYDROCHLORIDE 50 MG/ML
25 INJECTION INTRAMUSCULAR; INTRAVENOUS; SUBCUTANEOUS
Status: ACTIVE | OUTPATIENT
Start: 2021-05-10 | End: 2021-05-10

## 2021-05-10 RX ORDER — SODIUM CHLORIDE 9 MG/ML
25 INJECTION, SOLUTION INTRAVENOUS PRN
Status: DISCONTINUED | OUTPATIENT
Start: 2021-05-10 | End: 2021-05-11

## 2021-05-10 RX ORDER — FENTANYL CITRATE 50 UG/ML
INJECTION, SOLUTION INTRAMUSCULAR; INTRAVENOUS
Status: DISCONTINUED
Start: 2021-05-10 | End: 2021-05-11

## 2021-05-10 RX ORDER — SENNA PLUS 8.6 MG/1
1 TABLET ORAL NIGHTLY
Status: DISCONTINUED | OUTPATIENT
Start: 2021-05-11 | End: 2021-05-11

## 2021-05-10 RX ORDER — NICOTINE POLACRILEX 4 MG
15 LOZENGE BUCCAL PRN
Status: DISCONTINUED | OUTPATIENT
Start: 2021-05-10 | End: 2021-05-14 | Stop reason: HOSPADM

## 2021-05-10 RX ORDER — SENNA AND DOCUSATE SODIUM 50; 8.6 MG/1; MG/1
1 TABLET, FILM COATED ORAL 2 TIMES DAILY
Status: DISCONTINUED | OUTPATIENT
Start: 2021-05-10 | End: 2021-05-11

## 2021-05-10 RX ORDER — POTASSIUM CHLORIDE 29.8 MG/ML
20 INJECTION INTRAVENOUS PRN
Status: DISCONTINUED | OUTPATIENT
Start: 2021-05-10 | End: 2021-05-11

## 2021-05-10 RX ORDER — CHLORHEXIDINE GLUCONATE 0.12 MG/ML
15 RINSE ORAL 2 TIMES DAILY
Status: DISCONTINUED | OUTPATIENT
Start: 2021-05-10 | End: 2021-05-11

## 2021-05-10 RX ORDER — LIDOCAINE HYDROCHLORIDE 20 MG/ML
INJECTION, SOLUTION INTRAVENOUS PRN
Status: DISCONTINUED | OUTPATIENT
Start: 2021-05-10 | End: 2021-05-10 | Stop reason: SDUPTHER

## 2021-05-10 RX ORDER — ONDANSETRON 2 MG/ML
4 INJECTION INTRAMUSCULAR; INTRAVENOUS EVERY 8 HOURS PRN
Status: DISCONTINUED | OUTPATIENT
Start: 2021-05-10 | End: 2021-05-14 | Stop reason: HOSPADM

## 2021-05-10 RX ORDER — FENTANYL CITRATE 0.05 MG/ML
INJECTION, SOLUTION INTRAMUSCULAR; INTRAVENOUS PRN
Status: DISCONTINUED | OUTPATIENT
Start: 2021-05-10 | End: 2021-05-10 | Stop reason: SDUPTHER

## 2021-05-10 RX ORDER — VASOPRESSIN 20 U/ML
INJECTION PARENTERAL PRN
Status: DISCONTINUED | OUTPATIENT
Start: 2021-05-10 | End: 2021-05-10 | Stop reason: SDUPTHER

## 2021-05-10 RX ORDER — AMINOCAPROIC ACID 250 MG/ML
INJECTION, SOLUTION INTRAVENOUS PRN
Status: DISCONTINUED | OUTPATIENT
Start: 2021-05-10 | End: 2021-05-10 | Stop reason: SDUPTHER

## 2021-05-10 RX ORDER — HEPARIN SODIUM 10000 [USP'U]/ML
INJECTION, SOLUTION INTRAVENOUS; SUBCUTANEOUS PRN
Status: DISCONTINUED | OUTPATIENT
Start: 2021-05-10 | End: 2021-05-10 | Stop reason: SDUPTHER

## 2021-05-10 RX ORDER — PROPOFOL 10 MG/ML
10 INJECTION, EMULSION INTRAVENOUS CONTINUOUS PRN
Status: DISCONTINUED | OUTPATIENT
Start: 2021-05-10 | End: 2021-05-11

## 2021-05-10 RX ORDER — SODIUM CHLORIDE 9 MG/ML
10 INJECTION INTRAVENOUS DAILY
Status: COMPLETED | OUTPATIENT
Start: 2021-05-10 | End: 2021-05-10

## 2021-05-10 RX ORDER — ALBUMIN, HUMAN INJ 5% 5 %
SOLUTION INTRAVENOUS
Status: COMPLETED
Start: 2021-05-10 | End: 2021-05-10

## 2021-05-10 RX ORDER — GLYCOPYRROLATE 1 MG/5 ML
SYRINGE (ML) INTRAVENOUS PRN
Status: DISCONTINUED | OUTPATIENT
Start: 2021-05-10 | End: 2021-05-10 | Stop reason: SDUPTHER

## 2021-05-10 RX ORDER — VECURONIUM BROMIDE 1 MG/ML
INJECTION, POWDER, LYOPHILIZED, FOR SOLUTION INTRAVENOUS PRN
Status: DISCONTINUED | OUTPATIENT
Start: 2021-05-10 | End: 2021-05-10 | Stop reason: SDUPTHER

## 2021-05-10 RX ADMIN — Medication 2000 MG: at 20:38

## 2021-05-10 RX ADMIN — ALBUMIN (HUMAN) 500 ML: 12.5 INJECTION, SOLUTION INTRAVENOUS at 15:53

## 2021-05-10 RX ADMIN — LIDOCAINE HYDROCHLORIDE 100 MG: 20 INJECTION, SOLUTION INTRAVENOUS at 12:31

## 2021-05-10 RX ADMIN — MUPIROCIN: 20 OINTMENT TOPICAL at 07:47

## 2021-05-10 RX ADMIN — FENTANYL CITRATE 250 MCG: 50 INJECTION, SOLUTION INTRAMUSCULAR; INTRAVENOUS at 13:05

## 2021-05-10 RX ADMIN — SODIUM CHLORIDE 30 ML/HR: 9 INJECTION, SOLUTION INTRAVENOUS at 17:15

## 2021-05-10 RX ADMIN — AMINOCAPROIC ACID 1 G/HR: 250 INJECTION, SOLUTION INTRAVENOUS at 12:48

## 2021-05-10 RX ADMIN — SODIUM CHLORIDE: 9 INJECTION, SOLUTION INTRAVENOUS at 12:23

## 2021-05-10 RX ADMIN — VECURONIUM BROMIDE 20 MG: 10 INJECTION, POWDER, LYOPHILIZED, FOR SOLUTION INTRAVENOUS at 12:31

## 2021-05-10 RX ADMIN — FENTANYL CITRATE 25 MCG: 50 INJECTION, SOLUTION INTRAMUSCULAR; INTRAVENOUS at 20:39

## 2021-05-10 RX ADMIN — VASOPRESSIN 1 UNITS: 20 INJECTION INTRAVENOUS at 15:59

## 2021-05-10 RX ADMIN — PROPOFOL 200 MG: 10 INJECTION, EMULSION INTRAVENOUS at 12:31

## 2021-05-10 RX ADMIN — FENTANYL CITRATE 25 MCG: 50 INJECTION, SOLUTION INTRAMUSCULAR; INTRAVENOUS at 18:38

## 2021-05-10 RX ADMIN — AMINOCAPROIC ACID 5000 MG: 250 INJECTION, SOLUTION INTRAVENOUS at 12:45

## 2021-05-10 RX ADMIN — FENTANYL CITRATE 200 MCG: 50 INJECTION, SOLUTION INTRAMUSCULAR; INTRAVENOUS at 12:59

## 2021-05-10 RX ADMIN — ASPIRIN 300 MG: 300 SUPPOSITORY RECTAL at 18:33

## 2021-05-10 RX ADMIN — IPRATROPIUM BROMIDE AND ALBUTEROL SULFATE 1 AMPULE: .5; 3 SOLUTION RESPIRATORY (INHALATION) at 19:42

## 2021-05-10 RX ADMIN — PROTAMINE SULFATE 300 MG: 10 INJECTION, SOLUTION INTRAVENOUS at 15:45

## 2021-05-10 RX ADMIN — Medication 0.2 MG: at 13:14

## 2021-05-10 RX ADMIN — Medication 2 G: at 12:45

## 2021-05-10 RX ADMIN — INSULIN HUMAN 3 UNITS: 100 INJECTION, SOLUTION PARENTERAL at 14:34

## 2021-05-10 RX ADMIN — PANTOPRAZOLE SODIUM 40 MG: 40 INJECTION, POWDER, FOR SOLUTION INTRAVENOUS at 18:32

## 2021-05-10 RX ADMIN — SODIUM CHLORIDE, PRESERVATIVE FREE 10 ML: 5 INJECTION INTRAVENOUS at 18:33

## 2021-05-10 RX ADMIN — PROPOFOL 10 MCG/KG/MIN: 10 INJECTION, EMULSION INTRAVENOUS at 17:15

## 2021-05-10 RX ADMIN — ALBUMIN (HUMAN) 25 G: 12.5 INJECTION, SOLUTION INTRAVENOUS at 18:30

## 2021-05-10 RX ADMIN — FENTANYL CITRATE 50 MCG: 50 INJECTION, SOLUTION INTRAMUSCULAR; INTRAVENOUS at 17:20

## 2021-05-10 RX ADMIN — PHENYLEPHRINE HYDROCHLORIDE 100 MCG: 10 INJECTION INTRAVENOUS at 15:33

## 2021-05-10 RX ADMIN — SODIUM CHLORIDE, POTASSIUM CHLORIDE, SODIUM LACTATE AND CALCIUM CHLORIDE: 600; 310; 30; 20 INJECTION, SOLUTION INTRAVENOUS at 12:23

## 2021-05-10 RX ADMIN — CHLORHEXIDINE GLUCONATE 15 ML: 1.2 RINSE ORAL at 05:16

## 2021-05-10 RX ADMIN — FENTANYL CITRATE 350 MCG: 50 INJECTION, SOLUTION INTRAMUSCULAR; INTRAVENOUS at 13:07

## 2021-05-10 RX ADMIN — AMLODIPINE BESYLATE 10 MG: 10 TABLET ORAL at 07:43

## 2021-05-10 RX ADMIN — RANOLAZINE 500 MG: 500 TABLET, FILM COATED, EXTENDED RELEASE ORAL at 07:43

## 2021-05-10 RX ADMIN — SODIUM CHLORIDE 6 UNITS/HR: 9 INJECTION, SOLUTION INTRAVENOUS at 14:09

## 2021-05-10 RX ADMIN — Medication 10 ML: at 07:43

## 2021-05-10 RX ADMIN — FENTANYL CITRATE 200 MCG: 50 INJECTION, SOLUTION INTRAMUSCULAR; INTRAVENOUS at 12:31

## 2021-05-10 RX ADMIN — ISOSORBIDE MONONITRATE 30 MG: 30 TABLET ORAL at 07:43

## 2021-05-10 RX ADMIN — PHENYLEPHRINE HYDROCHLORIDE 100 MCG: 10 INJECTION INTRAVENOUS at 15:37

## 2021-05-10 RX ADMIN — VASOPRESSIN 1 UNITS: 20 INJECTION INTRAVENOUS at 16:27

## 2021-05-10 RX ADMIN — MIDAZOLAM 2 MG: 1 INJECTION INTRAMUSCULAR; INTRAVENOUS at 11:05

## 2021-05-10 RX ADMIN — MUPIROCIN: 20 OINTMENT TOPICAL at 20:38

## 2021-05-10 RX ADMIN — HEPARIN SODIUM 40000 UNITS: 10000 INJECTION INTRAVENOUS; SUBCUTANEOUS at 13:37

## 2021-05-10 RX ADMIN — CEFAZOLIN 2000 MG: 1 INJECTION, POWDER, FOR SOLUTION INTRAMUSCULAR; INTRAVENOUS at 16:28

## 2021-05-10 ASSESSMENT — PULMONARY FUNCTION TESTS
PIF_VALUE: 20
PIF_VALUE: 20
PIF_VALUE: 1
PIF_VALUE: 1
PIF_VALUE: 18
PIF_VALUE: 19
PIF_VALUE: 1
PIF_VALUE: 20
PIF_VALUE: 1
PIF_VALUE: 20
PIF_VALUE: 1
PIF_VALUE: 1
PIF_VALUE: 18
PIF_VALUE: 21
PIF_VALUE: 23
PIF_VALUE: 1
PIF_VALUE: 2
PIF_VALUE: 1
PIF_VALUE: 1
PIF_VALUE: 18
PIF_VALUE: 1
PIF_VALUE: 0
PIF_VALUE: 1
PIF_VALUE: 0
PIF_VALUE: 20
PIF_VALUE: 20
PIF_VALUE: 0
PIF_VALUE: 1
PIF_VALUE: 18
PIF_VALUE: 21
PIF_VALUE: 20
PIF_VALUE: 20
PIF_VALUE: 1
PIF_VALUE: 18
PIF_VALUE: 20
PIF_VALUE: 17
PIF_VALUE: 20
PIF_VALUE: 19
PIF_VALUE: 0
PIF_VALUE: 17
PIF_VALUE: 1
PIF_VALUE: 18
PIF_VALUE: 1
PIF_VALUE: 1
PIF_VALUE: 17
PIF_VALUE: 1
PIF_VALUE: 9
PIF_VALUE: 20
PIF_VALUE: 21
PIF_VALUE: 21
PIF_VALUE: 18
PIF_VALUE: 19
PIF_VALUE: 20
PIF_VALUE: 20
PIF_VALUE: 17
PIF_VALUE: 20
PIF_VALUE: 17
PIF_VALUE: 1
PIF_VALUE: 19
PIF_VALUE: 20
PIF_VALUE: 18
PIF_VALUE: 1
PIF_VALUE: 21
PIF_VALUE: 20
PIF_VALUE: 21
PIF_VALUE: 20
PIF_VALUE: 19
PIF_VALUE: 20
PIF_VALUE: 1
PIF_VALUE: 1
PIF_VALUE: 22
PIF_VALUE: 21
PIF_VALUE: 18
PIF_VALUE: 22
PIF_VALUE: 1
PIF_VALUE: 1
PIF_VALUE: 21
PIF_VALUE: 19
PIF_VALUE: 20
PIF_VALUE: 1
PIF_VALUE: 1
PIF_VALUE: 17
PIF_VALUE: 21
PIF_VALUE: 17
PIF_VALUE: 1
PIF_VALUE: 19
PIF_VALUE: 20
PIF_VALUE: 20
PIF_VALUE: 1
PIF_VALUE: 20
PIF_VALUE: 1
PIF_VALUE: 19
PIF_VALUE: 1
PIF_VALUE: 21
PIF_VALUE: 0
PIF_VALUE: 0
PIF_VALUE: 21
PIF_VALUE: 1
PIF_VALUE: 17
PIF_VALUE: 1
PIF_VALUE: 20
PIF_VALUE: 20
PIF_VALUE: 16
PIF_VALUE: 19
PIF_VALUE: 18
PIF_VALUE: 19
PIF_VALUE: 20
PIF_VALUE: 21
PIF_VALUE: 19
PIF_VALUE: 1
PIF_VALUE: 1
PIF_VALUE: 20
PIF_VALUE: 1
PIF_VALUE: 20
PIF_VALUE: 1
PIF_VALUE: 1
PIF_VALUE: 20
PIF_VALUE: 1
PIF_VALUE: 18
PIF_VALUE: 0
PIF_VALUE: 21
PIF_VALUE: 1
PIF_VALUE: 18
PIF_VALUE: 20
PIF_VALUE: 19
PIF_VALUE: 19
PIF_VALUE: 20
PIF_VALUE: 21
PIF_VALUE: 18
PIF_VALUE: 20
PIF_VALUE: 18
PIF_VALUE: 15
PIF_VALUE: 1
PIF_VALUE: 21
PIF_VALUE: 20
PIF_VALUE: 20
PIF_VALUE: 1
PIF_VALUE: 17
PIF_VALUE: 20
PIF_VALUE: 4
PIF_VALUE: 21
PIF_VALUE: 21
PIF_VALUE: 1

## 2021-05-10 ASSESSMENT — PAIN SCALES - GENERAL
PAINLEVEL_OUTOF10: 0
PAINLEVEL_OUTOF10: 3
PAINLEVEL_OUTOF10: 4

## 2021-05-10 ASSESSMENT — PAIN SCALES - WONG BAKER: WONGBAKER_NUMERICALRESPONSE: 0

## 2021-05-10 NOTE — ANESTHESIA PROCEDURE NOTES
Procedure Performed: PAULETTE     Start Time:        End Time:      Preanesthesia Checklist:  Patient identified, IV assessed, risks and benefits discussed, monitors and equipment assessed, procedure being performed at surgeon's request and anesthesia consent obtained. General Procedure Information  Diagnostic Indications for Echo:  assessment of surgical repair, hemodynamic monitoring and assessment of valve function  Physician Requesting Echo: Yang Lim DO  Location performed:  OR  Intubated  Bite block placed  Heart visualized  Probe Insertion:  Easy  Probe Type:  3D and mulitplane  Modalities:  3D, color flow mapping, pulse wave Doppler and continuous wave Doppler    Echocardiographic and Doppler Measurements    Ventricles    Right Ventricle:  Cavity size normal.  Hypertrophy not present. Thrombus not present. Global function normal.    Left Ventricle:  Cavity size normal.  Hypertrophy not present. Thrombus not present. Global Function mildly impaired. Ejection Fraction 45%. Ventricular Regional Function:  1- Basal Anteroseptal:  normal  2- Basal Anterior:  normal  3- Basal Anterolateral:  normal  4- Basal Inferolateral:  normal  5- Basal Inferior:  normal  6- Basal Inferoseptal:  normal  7- Mid Anteroseptal:  hypokinetic  8- Mid Anterior:  normal  9- Mid Anterolateral:  normal  10- Mid Inferolateral:  normal  11- Mid Inferior:  normal  12- Mid Inferoseptal:  normal  13- Apical Anterior:  normal  14- Apical Lateral:  normal  15- Apical Inferior:  normal  16- Apical Septal:  hypokinetic  17- [place]:  normal      Valves    Aortic Valve: Annulus normal.  Stenosis not present. Regurgitation none. Leaflets normal.  Leaflet motions normal.      Mitral Valve: Annulus normal.  Stenosis not present. Regurgitation mild. Leaflets normal.  Leaflet motions normal.      Tricuspid Valve: Annulus normal.  Stenosis not present. Regurgitation mild.   Leaflets normal.  Leaflet motions normal.    Pulmonic Valve: Annulus normal.  Stenosis not present. Regurgitation none. Aorta    Ascending Aorta:  Size normal.  Dissection not present. Aortic Arch:  Size normal.  Dissection not present. Descending Aorta:  Size normal.  Dissection not present. Other Aortic Findings:       Brynda Goody grade 1 atheroma throughout the aorta    Atria    Right Atrium:  Size normal.  Spontaneous echo contrast not present. Thrombus not present. Tumor not present. Device not present. Left Atrium:  Size normal.  Spontaneous echo contrast not present. Thrombus not present. Tumor not present. Device not present. Left atrial appendage normal.      Septa    Atrial Septum:  Intra-atrial septal morphology normal.      Ventricular Septum:  Intra-ventricular septum morphology normal.          Other Findings  Pericardium:  normal  Pleural Effusion:  none  Pulmonary Arteries:  normal  Pulmonary Venous Flow:  normal    Anesthesia Information  Performed Personally  Anesthesiologist:  Mt Pratt DO      Echocardiogram Comments:       S/p CABG x 3.  Normal RVEF mildly reduced LVEF with some septal hypokinesis more pronounced near the apex. All other findings same as prebypass.   All findings d/w surgeon

## 2021-05-10 NOTE — FLOWSHEET NOTE
Pt awake and following commands. Sedation is off. Ventilator flipped to pressure support.  Plan to extubated soon

## 2021-05-10 NOTE — PROGRESS NOTES
Messaged cardiothoracic NP to notify pt is not on betablocker and to see if they want one given prior to procedure.  Awaiting response

## 2021-05-10 NOTE — PROGRESS NOTES
Hospitalist Progress Note      SYNOPSIS:      Patient admitted on 2021 for shortness of breath and chest pain.     54-year-old male with past medical history significant for hypertension came to our ER on  with chief complaint of chest pain and shortness of breath. He also complained of symptomatic bradycardia with heart rate of 40s on standing with some lightheadedness. In the ER his troponins were elevated to 0.53 and lactic acid of 2.3. Chest x-ray was concerning for pneumonia with left greater than right parenchymal infiltrates. He was also in hypertensive urgency and was started on nitroglycerin with complete resolution of his chest pain and improvement in his blood pressure. Patient was also started on IV heparin for non-ST segment elevation MI. Patient underwent cardiac cath on 5/3 and was found to have severe multivessel disease but called for cardiothoracic evaluation. Evaluated by cardiothoracic surgeon with a plan to undergo CABG on 5/10. SUBJECTIVE:    Patient seen and examined in his room. No major overnight events. Denies any chest pain, shortness of breath, nausea, vomiting. Records reviewed. Stable overnight. No other overnight issues reported. Temp (24hrs), Av.2 °F (36.8 °C), Min:97.7 °F (36.5 °C), Max:98.5 °F (36.9 °C)    DIET: Diet NPO Time Specified Exceptions are: Sips of Water with Meds  CODE: Full Code    Intake/Output Summary (Last 24 hours) at 5/10/2021 0824  Last data filed at 5/10/2021 0752  Gross per 24 hour   Intake 480 ml   Output 1600 ml   Net -1120 ml       OBJECTIVE:    /75   Pulse 57   Temp 97.9 °F (36.6 °C) (Temporal)   Resp 16   Ht 5' 11\" (1.803 m)   Wt 217 lb (98.4 kg)   SpO2 97%   BMI 30.27 kg/m²     General appearance: No apparent distress, appears stated age and cooperative. HEENT:  Conjunctivae/corneas clear. Neck: Supple. No jugular venous distention.    Respiratory: Clear to auscultation bilaterally, normal respiratory effort  Cardiovascular: Regular rate rhythm, normal S1-S2  Abdomen: Soft, nontender, nondistended  Musculoskeletal: No clubbing, cyanosis, no bilateral lower extremity edema. Brisk capillary refill.    Skin:  No rashes  on visible skin  Neurologic: awake, alert and following commands     ASSESSMENT & PLAN:    Non-ST segment elevation MI  Continue heparin  Cardiac cath 5/3: Showed severe multivessel coronary artery disease  For CABG on Monday  Ranexa 500 mg p.o. twice daily    Elevated creatinine  Unclear etiology  Admitted with creatinine 1.0, today 1.3  With IV fluids at 75 cc/h, creatinine came down to 1.1 on 5/9, IV fluids discontinued on 5/9  Hold lisinopril for now, restart after surgery     Symptomatic bradycardia  Monitor for now  Improved     Lactic acidosis resolved     Hypertension  Presented with hypertensive urgency  Currently off IV drugs  Blood pressure well controlled with Norvasc 10, Imdur 30 mg p.o. twice daily, lisinopril 40 mg p.o. daily     Parenchymal infiltrate  Likely from fluid overload  Procalcitonin 0.08  Monitor off antibiotics     Tobacco abuse  Cessation education     Hyperlipidemia  Continue statins     Hypoglycemia  Hemoglobin A1c 5.8  Recommend dietary modifications and exercise     DVT prophylaxis  Subcu heparin       DISPOSITION:     Medications:  REVIEWED DAILY    Infusion Medications    sodium chloride      sodium chloride       Scheduled Medications    ceFAZolin (ANCEF) IVPB  2,000 mg Intravenous See Admin Instructions    mupirocin   Nasal BID    chlorhexidine   Topical See Admin Instructions    isosorbide mononitrate  30 mg Oral BID    sodium chloride flush  5-40 mL Intravenous 2 times per day    ranolazine  500 mg Oral BID    nitroGLYCERIN  0.4 mg Sublingual Once    aspirin  81 mg Oral Daily    atorvastatin  80 mg Oral Nightly    amLODIPine  10 mg Oral Daily     PRN Meds: sodium chloride flush, sodium chloride, hydrALAZINE, promethazine **OR** ondansetron, acetaminophen **OR** acetaminophen, polyethylene glycol, perflutren lipid microspheres, heparin (porcine), heparin (porcine)    Labs:     Recent Labs     05/08/21  0611 05/09/21  0633 05/10/21  0712   WBC 5.5 5.1 7.0   HGB 15.3 14.9 15.7   HCT 45.2 44.1 47.4    354 404       Recent Labs     05/08/21  0611 05/09/21  0633 05/10/21  0712    143 139   K 4.7  4.7 4.5  4.5 4.3    108* 104   CO2 28 29 23   BUN 20 15 15   CREATININE 1.3* 1.1 1.1   CALCIUM 9.8 9.1 10.0       No results for input(s): PROT, ALB, ALKPHOS, ALT, AST, BILITOT, AMYLASE, LIPASE in the last 72 hours. Recent Labs     05/09/21 0633   INR 1.2       No results for input(s): [place] in the last 72 hours. Chronic labs:    Lab Results   Component Value Date    CHOL 230 (H) 05/02/2021    TRIG 80 05/02/2021    HDL 37 05/02/2021    LDLCALC 177 (H) 05/02/2021    TSH 0.489 05/03/2021    INR 1.2 05/09/2021    LABA1C 5.8 (H) 05/02/2021       Radiology: REVIEWED DAILY    +++++++++++++++++++++++++++++++++++++++++++++++++  Arun, [address] [place], [place]  +++++++++++++++++++++++++++++++++++++++++++++++++  NOTE: This report was transcribed using voice recognition software. Every effort was made to ensure accuracy; however, inadvertent computerized transcription errors may be present.

## 2021-05-10 NOTE — CARE COORDINATION
Per CTS note, pt is second case today for CABG; per PB's note pt is HFA eligible, no help with meds on discharge, over income for Medicaid. Discharge plan is to return home with wife Yessy Oneal to provide transportation when medically stable.

## 2021-05-10 NOTE — PROGRESS NOTES
CVICU Admission Note    Name: Keli Collins  MRN: [id number]    CC: Postoperative Critical Care Management     Indication for Surgery/Procedure: CAD  LVEF:  45%    RVF:  Normal     Important/Relevant PMH/PSH: HTN, HLD, tobacco abuse, PVD, sinus bradycardia     Procedure/Surgeries: 5/10/2021 CABG x 3 (LIMA-LAD, SVG-OM, SVG-PDA)  Endarterectomy LAD   [facility]  KLS Plating      Pacing wires: Ventricular       Physical Exam:    BP (!) 146/81   Pulse 58   Temp 98 °F (36.7 °C)   Resp 14   Ht 5' 11\" (1.803 m)   Wt 217 lb (98.4 kg)   SpO2 99%   BMI 30.27 kg/m²     Recent Labs     05/10/21  0712   WBC 7.0   RBC 4.85   HGB 15.7   HCT 47.4   MCV 97.7   MCH 32.4   MCHC 33.1   RDW 12.5      MPV 9.2     Recent Labs     05/10/21  0712 05/10/21  0712 05/10/21  1603     --   --    K 4.3  4.3   < > 3.8     --   --    CO2 23  --   --    BUN 15  --   --    CREATININE 1.1  --   --    GLUCOSE 103*  --   --    CALCIUM 10.0  --   --     < > = values in this interval not displayed. Post operative CXR:        Atelectasis bilateral, No pneumothorax noted, Mildly increased vascular markings bilateral, No significant pleural effusion. ETT/lines/drains appear to be in proper position. Final Radiology report pending. General Appearance: Arrived to ICU intubated on ventilator, hemodynamically stable on no gtts   Eyes: PERRL  Pulmonary: Diminished bibasilar.   No wheezes, no accessory muscle use noted   Ventilator: Mode: AC/VC, 50 FiO2, 5 PEEP, 500 Vt   Cardiovascular: RRR, no heaves or thrills palpated   Telemetry: SR/SB  Abdomen: Soft, OG to LIWS  Extremities: Palpable pulses all upper extremities, RLE with +DP/ PT signals, LLE with popliteal signals, No DP/PT signals noted--warm blankets added  Neurologic/Psych: Sedated   Skin: Warm and dry   Incision: MSI with beth dressing intact, RLE SVG sites with ace wrap on    Assessment/Plan: Day of Surgery     1. CAD S/p CABGx3  - DAPT, Lipitor   - Ancef  - Monitor chest tube output and hemodynamics closely    2. Acute Pulmonary Insufficiency Following Surgery    - 2/2 surgery  - Intubated on ventilator  - Wean vent settings and extubate patient once awake, following commands, CROWDER, and no signs of bleeding  - ABGs per protocol and PRN   - Nebs     3.  Acute Post Operative Pain   - PRN fentanyl for pain management until able to take PO       Electronically signed by LINDA Hurley - CNP on 5/10/2021 at 5:51 PM

## 2021-05-10 NOTE — PROGRESS NOTES
Patient is in the shower for his first scrub. Linens, leads and gown changed. Will monitor. Able to make needs known.

## 2021-05-10 NOTE — FLOWSHEET NOTE
Pt admitted to [facility]. Placed on ventilator, chest tubes to suction, linares to gravity. Pt is SB, BP = 124/56, SpO2 = 99%. Will continue to monitor and sign pt out of anaesthesia in 2 hours.

## 2021-05-10 NOTE — FLOWSHEET NOTE
Pt signed out of anaesthesia at 1910. VS stable, pt alert and following commands. CT and linares outputs adequate. Pt suctioned and extubated to BiPAP. Voice clear, lungs CTA. No stridor. SpO2 = 98%. Will continue to monitor.

## 2021-05-10 NOTE — PLAN OF CARE
Problem: Cardiac Output - Decreased:  Goal: Hemodynamic stability will improve  Description: Hemodynamic stability will improve  5/10/2021 0024 by Juliann Pollard RN  Outcome: Met This Shift  5/9/2021 1544 by Madison Moreno RN  Outcome: Met This Shift     Problem: Falls - Risk of:  Goal: Will remain free from falls  Description: Will remain free from falls  5/10/2021 0024 by Juliann Pollard RN  Outcome: Met This Shift  5/9/2021 1544 by Madison Moreno RN  Outcome: Met This Shift  Goal: Absence of physical injury  Description: Absence of physical injury  5/10/2021 0024 by Juliann Pollard RN  Outcome: Met This Shift  5/9/2021 1544 by Madison Moreno RN  Outcome: Met This Shift     Problem: Skin Integrity:  Goal: Will show no infection signs and symptoms  Description: Will show no infection signs and symptoms  Outcome: Met This Shift  Goal: Absence of new skin breakdown  Description: Absence of new skin breakdown  Outcome: Met This Shift

## 2021-05-10 NOTE — PLAN OF CARE
Problem: Cardiac Output - Decreased:  Goal: Hemodynamic stability will improve  Description: Hemodynamic stability will improve  Outcome: Met This Shift     Problem: Falls - Risk of:  Goal: Will remain free from falls  Description: Will remain free from falls  Outcome: Met This Shift  Goal: Absence of physical injury  Description: Absence of physical injury  Outcome: Met This Shift     Problem: Skin Integrity:  Goal: Will show no infection signs and symptoms  Description: Will show no infection signs and symptoms  Outcome: Met This Shift  Goal: Absence of new skin breakdown  Description: Absence of new skin breakdown  Outcome: Met This Shift     Problem: Cardiac Output - Decreased:  Goal: Cardiac output within specified parameters  Description: Cardiac output within specified parameters  Outcome: Met This Shift     Problem: Fluid Volume - Imbalance:  Goal: Chest tube drainage is within specified parameters  Description: Chest tube drainage is within specified parameters  Outcome: Met This Shift     Problem: Gas Exchange - Impaired:  Goal: Levels of oxygenation will improve  Description: Levels of oxygenation will improve  Outcome: Met This Shift  Goal: Ability to maintain adequate ventilation will improve  Description: Ability to maintain adequate ventilation will improve  Outcome: Met This Shift     Problem: Pain:  Goal: Pain level will decrease  Description: Pain level will decrease  Outcome: Ongoing  Goal: Control of acute pain  Description: Control of acute pain  Outcome: Ongoing

## 2021-05-10 NOTE — ANESTHESIA PROCEDURE NOTES
Central Venous Line:    A central venous line was placed using ultrasound guidance, in the OR for the following indication(s): central venous access. Sterility preparation included the following: hand hygiene performed prior to procedure, maximum sterile barriers used and sterile technique used to drape from head to toe. The patient was placed in Trendelenburg position. The right internal jugular vein was prepped. The site was prepped with Chloraprep. A 9 Fr (size), introducer double lumen was placed. During the procedure, the following specific steps were taken: target vein identified, needle advanced into vein and blood aspirated and guidewire advanced into vein. Intravenous verification was obtained by ultrasound and venous blood return. Post insertion care included: all ports aspirated, all ports flushed easily, guidewire removed intact, Biopatch applied, line sutured in place and dressing applied. During the procedure the patient experienced: patient tolerated procedure well with no complications.       Anesthesia type: local..No  Staffing  Performed: Anesthesiologist   Anesthesiologist: Irene Issa DO  Preanesthetic Checklist  Completed: patient identified, IV checked, site marked, risks and benefits discussed, surgical consent, monitors and equipment checked, pre-op evaluation, timeout performed, anesthesia consent given, oxygen available and patient being monitored

## 2021-05-10 NOTE — BRIEF OP NOTE
Brief Postoperative Note      Patient: Aziza Crouch  YOB: 1970  MRN: [id number]    Date of Procedure: 5/10/2021    Pre-Op Diagnosis: MVCAD    Post-Op Diagnosis: Same       Procedure(s):  CABG x 3 (LIMA-LAD, SVG-OM, SVG-PDA)  Endarterectomy LAD   [facility]  KLS Plating     Surgeon(s):  Angela Vila DO    Assistant:  Physician Assistant: GELA Vines; Regina Hastings PA-C; LINDA Gonzalez - CNP    Anesthesia: General    Estimated Blood Loss (mL): less than 50     Complications: None    Specimens:   ID Type Source Tests Collected by Time Destination   A : LAD THROMBUS Tissue Heart SURGICAL PATHOLOGY Angela Vila DO 5/10/2021 1459        Implants:  Implant Name Type Inv. Item Serial No.  Lot No. LRB No. Used Action   PLATE BONE 9.8BD THICKNESS STRNL LCK 6 H CP TI  PLATE BONE 1.7HJ THICKNESS STRNL LCK 6 H CP TI  KLS CONG LP-WD  N/A 1 Implanted   PLATE LCK STRNL 8-H LAD T 1.8MM  PLATE LCK STRNL 8-H LAD T 1.8MM  KLS CONG LP-WD  N/A 1 Implanted   SCREW BNE L11MM DIA2. 3MM THOR STRNL TI VJ DRL FREE LEV 1  SCREW BNE L11MM DIA2. 3MM THOR STRNL TI VJ DRL FREE LEV 1  KLS CONG LP-WD  N/A 9 Implanted   SCREW BNE L13MM DIA2. 3MM THOR STRNL TI VJ DRL FREE LEV 1  SCREW BNE L13MM DIA2. 3MM THOR STRNL TI VJ DRL FREE LEV 1  KLS CONG LP-WD  N/A 5 Implanted         Drains:   Chest Tube 1 Anterior Mediastinal 19 [place] (Active)   Drainage Description Other (Comment) 05/10/21 1550   Dressing Status Clean;Dry; Intact 05/10/21 1550       Chest Tube 2 Anterior Mediastinal 19 [place] (Active)   Dressing Status Clean;Dry; Intact 05/10/21 1549       Chest Tube 3 Anterior Pleural 19 [place] (Active)   Dressing Status Clean;Dry; Intact 05/10/21 1549       Chest Tube 4 Anterior Pleural 19 [place] (Active)   Dressing Status Clean;Dry; Intact 05/10/21 1548       Urethral Catheter Temperature probe 16 fr (Active)         Electronically signed by Angela Vila DO on 5/10/2021 at 4:50 PM

## 2021-05-10 NOTE — PROGRESS NOTES
CC: CP    Brief HPI:  Patient seen and discussed with Dr. Yesenia Varela. Awake, alert. No complaints. Awaiting surgery. Family present in room. Past Medical History:   Diagnosis Date    CAD (coronary artery disease)     nstemi 5/2    Hyperlipidemia     Hypertension      Past Surgical History:   Procedure Laterality Date    CARDIAC CATHETERIZATION  05/03/2021    Dr. Ruth Ann Flores History    Marital status:      Spouse name: Not on file    Number of children: Not on file    Years of education: Not on file    Highest education level: Not on file   Occupational History    Not on file   Social Needs    Financial resource strain: Not on file    Food insecurity     Worry: Not on file     Inability: Not on file   [employer] needs     Medical: Not on file     Non-medical: Not on file   Tobacco Use    Smoking status: Current Every Day Smoker     Packs/day: 0.03     Types: Cigars    Smokeless tobacco: Never Used    Tobacco comment: 5 skinny cigars a day.    Substance and Sexual Activity    Alcohol use: No    Drug use: No    Sexual activity: Yes     Partners: Female   Lifestyle    Physical activity     Days per week: Not on file     Minutes per session: Not on file    Stress: Not on file   Relationships    Social connections     Talks on phone: Not on file     Gets together: Not on file     Attends Latter-day service: Not on file     Active member of club or organization: Not on file     Attends meetings of clubs or organizations: Not on file     Relationship status: Not on file    Intimate partner violence     Fear of current or ex partner: Not on file     Emotionally abused: Not on file     Physically abused: Not on file     Forced sexual activity: Not on file   Other Topics Concern    Not on file   Social History Narrative    Not on file     Family History   Problem Relation Age of Onset    Diabetes Mother     Diabetes Father     High Blood Pressure Father     Stroke Father     Cancer Paternal Grandfather     No Known Problems Sister     High Blood Pressure Brother     No Known Problems Sister     No Known Problems Sister     No Known Problems Sister        Vitals:    05/09/21 2230 05/10/21 0442 05/10/21 0743 05/10/21 0745   BP: (!) 141/68  127/75 127/75   Pulse: 63   57   Resp: 18   16   Temp: 98.5 °F (36.9 °C)   97.9 °F (36.6 °C)   TempSrc: Temporal   Temporal   SpO2: 97%   97%   Weight:  217 lb (98.4 kg)     Height:               Intake/Output Summary (Last 24 hours) at 5/10/2021 0851  Last data filed at 5/10/2021 0752  Gross per 24 hour   Intake 480 ml   Output 1600 ml   Net -1120 ml         Recent Labs     05/08/21  0611 05/09/21  0633 05/10/21  0712   WBC 5.5 5.1 7.0   HGB 15.3 14.9 15.7   HCT 45.2 44.1 47.4    354 404      Recent Labs     05/08/21  0611 05/09/21  0633 05/10/21  0712   BUN 20 15 15   CREATININE 1.3* 1.1 1.1         ROS:   Negative for CP, palpitations, SOB at rest, dizziness/lightheadedness. Physical Exam   Constitutional: Oriented to person, place, and time. Appears well-developed. No distress. Cardiovascular: bradycardia, regular rhythm and normal heart sounds. Pulmonary/Chest: Effort normal. No respiratory distress. Abdominal: Soft. Bowel sounds are normal.   Musculoskeletal: Normal range of motion. Neurological: alert and oriented to person, place, and time. Skin: Skin is warm and dry. Psychiatric: normal mood and affect.          A/P:  1) NSTEMI, MVCAD  --awaiting brilinta washout - surgery scheduled for 2nd case 5/10--today  --NO ACE DAY OF SURGERY  --NOT ON BB DUE TO SINUS BRADYCARDIA---HR 50's  --heparin infusion stopped  --patient denies any further questions      Pre-operative testing review:   --carotids with no significant stenosis  --ramona with decreased ankle brachial index of 0.81 on the right and 0.59 on the  left, with adequate collateral flow to both feet   --ct chest reviewed  --TTE with no significant LV dysfunction or valvular abnormalities, EF ~45-50%  --pft with FEV1 91 percent of predicted and DLCO 70 percent of predicted  --hgA1c 5.8      Note: 25 minutes was spent providing face-to-face patient care, including:  and coordinating care, reviewing the chart, labs, and diagnostics, as well as medical decision making. Greater than 50% of this time was spent instructing and counseling the patient face to face regarding findings and recommendations.

## 2021-05-11 ENCOUNTER — APPOINTMENT (OUTPATIENT)
Dept: GENERAL RADIOLOGY | Age: 51
DRG: 233 | End: 2021-05-11

## 2021-05-11 LAB
ANION GAP SERPL CALCULATED.3IONS-SCNC: 11 MMOL/L (ref 7–16)
B.E.: -2.5 MMOL/L (ref -3–3)
B.E.: -3.6 MMOL/L (ref -3–0)
BUN BLDV-MCNC: 14 MG/DL (ref 6–20)
CALCIUM SERPL-MCNC: 9.4 MG/DL (ref 8.6–10.2)
CARDIOPULMONARY BYPASS: NO
CHLORIDE BLD-SCNC: 104 MMOL/L (ref 98–107)
CO2: 24 MMOL/L (ref 22–29)
COHB: 0.8 % (ref 0–1.5)
CREAT SERPL-MCNC: 1 MG/DL (ref 0.7–1.2)
CRITICAL: ABNORMAL
DATE ANALYZED: ABNORMAL
DATE OF COLLECTION: ABNORMAL
DEVICE: ABNORMAL
GFR AFRICAN AMERICAN: >60
GFR NON-AFRICAN AMERICAN: >60 ML/MIN/1.73
GLUCOSE BLD-MCNC: 166 MG/DL (ref 74–99)
HCO3 ARTERIAL: 22.1 MMOL/L (ref 22–26)
HCO3: 23.3 MMOL/L (ref 22–26)
HCT (EST): 29 % (ref 37–54)
HCT VFR BLD CALC: 38.7 % (ref 37–54)
HEMOGLOBIN: 12.9 G/DL (ref 12.5–16.5)
HGB, (EST): 10 G/DL (ref 12.5–15.5)
HHB: 6.1 % (ref 0–5)
LAB: ABNORMAL
Lab: ABNORMAL
MAGNESIUM: 2.2 MG/DL (ref 1.6–2.6)
MCH RBC QN AUTO: 32.6 PG (ref 26–35)
MCHC RBC AUTO-ENTMCNC: 33.3 % (ref 32–34.5)
MCV RBC AUTO: 97.7 FL (ref 80–99.9)
METER GLUCOSE: 120 MG/DL (ref 74–99)
METER GLUCOSE: 121 MG/DL (ref 74–99)
METER GLUCOSE: 129 MG/DL (ref 74–99)
METER GLUCOSE: 132 MG/DL (ref 74–99)
METER GLUCOSE: 154 MG/DL (ref 74–99)
METER GLUCOSE: 161 MG/DL (ref 74–99)
METHB: 0.3 % (ref 0–1.5)
MODE: ABNORMAL
O2 CONTENT: 17.9 ML/DL
O2 SATURATION: 93.8 % (ref 92–98.5)
O2 SATURATION: 96 % (ref 92–98.5)
O2HB: 92.8 % (ref 94–97)
OPERATOR ID: ABNORMAL
OPERATOR ID: ABNORMAL
PATIENT TEMP: 37 C
PCO2 ARTERIAL: 41.6 MMHG (ref 35–45)
PCO2: 43.6 MMHG (ref 35–45)
PDW BLD-RTO: 12.5 FL (ref 11.5–15)
PH BLOOD GAS: 7.33 (ref 7.35–7.45)
PH BLOOD GAS: 7.34 (ref 7.35–7.45)
PLATELET # BLD: 310 E9/L (ref 130–450)
PMV BLD AUTO: 9 FL (ref 7–12)
PO2 ARTERIAL: 87.2 MMHG (ref 80–100)
PO2: 71.6 MMHG (ref 75–100)
POTASSIUM SERPL-SCNC: 3.9 MMOL/L (ref 3.5–5.5)
POTASSIUM SERPL-SCNC: 4.2 MMOL/L (ref 3.5–5)
POTASSIUM SERPL-SCNC: 4.7 MMOL/L (ref 3.5–5)
POTASSIUM SERPL-SCNC: 5.2 MMOL/L (ref 3.5–5)
RBC # BLD: 3.96 E12/L (ref 3.8–5.8)
SODIUM BLD-SCNC: 139 MMOL/L (ref 132–146)
SOURCE, BLOOD GAS: ABNORMAL
SOURCE, BLOOD GAS: ABNORMAL
THB: 13.7 G/DL (ref 11.5–16.5)
TIME ANALYZED: 412
WBC # BLD: 15.2 E9/L (ref 4.5–11.5)

## 2021-05-11 PROCEDURE — 82805 BLOOD GASES W/O2 SATURATION: CPT

## 2021-05-11 PROCEDURE — 99221 1ST HOSP IP/OBS SF/LOW 40: CPT | Performed by: NURSE PRACTITIONER

## 2021-05-11 PROCEDURE — 6360000002 HC RX W HCPCS: Performed by: THORACIC SURGERY (CARDIOTHORACIC VASCULAR SURGERY)

## 2021-05-11 PROCEDURE — 6370000000 HC RX 637 (ALT 250 FOR IP): Performed by: THORACIC SURGERY (CARDIOTHORACIC VASCULAR SURGERY)

## 2021-05-11 PROCEDURE — 71045 X-RAY EXAM CHEST 1 VIEW: CPT

## 2021-05-11 PROCEDURE — 2700000000 HC OXYGEN THERAPY PER DAY

## 2021-05-11 PROCEDURE — 97535 SELF CARE MNGMENT TRAINING: CPT

## 2021-05-11 PROCEDURE — 85027 COMPLETE CBC AUTOMATED: CPT

## 2021-05-11 PROCEDURE — 82962 GLUCOSE BLOOD TEST: CPT

## 2021-05-11 PROCEDURE — 83735 ASSAY OF MAGNESIUM: CPT

## 2021-05-11 PROCEDURE — 2580000003 HC RX 258: Performed by: NURSE PRACTITIONER

## 2021-05-11 PROCEDURE — 6360000002 HC RX W HCPCS: Performed by: NURSE PRACTITIONER

## 2021-05-11 PROCEDURE — 94660 CPAP INITIATION&MGMT: CPT

## 2021-05-11 PROCEDURE — 94640 AIRWAY INHALATION TREATMENT: CPT

## 2021-05-11 PROCEDURE — 2500000003 HC RX 250 WO HCPCS: Performed by: THORACIC SURGERY (CARDIOTHORACIC VASCULAR SURGERY)

## 2021-05-11 PROCEDURE — 97162 PT EVAL MOD COMPLEX 30 MIN: CPT

## 2021-05-11 PROCEDURE — 6370000000 HC RX 637 (ALT 250 FOR IP): Performed by: NURSE PRACTITIONER

## 2021-05-11 PROCEDURE — 94664 DEMO&/EVAL PT USE INHALER: CPT

## 2021-05-11 PROCEDURE — C9248 INJ, CLEVIDIPINE BUTYRATE: HCPCS | Performed by: THORACIC SURGERY (CARDIOTHORACIC VASCULAR SURGERY)

## 2021-05-11 PROCEDURE — 97166 OT EVAL MOD COMPLEX 45 MIN: CPT

## 2021-05-11 PROCEDURE — 2580000003 HC RX 258: Performed by: THORACIC SURGERY (CARDIOTHORACIC VASCULAR SURGERY)

## 2021-05-11 PROCEDURE — 80048 BASIC METABOLIC PNL TOTAL CA: CPT

## 2021-05-11 PROCEDURE — 97530 THERAPEUTIC ACTIVITIES: CPT

## 2021-05-11 PROCEDURE — 36415 COLL VENOUS BLD VENIPUNCTURE: CPT

## 2021-05-11 PROCEDURE — 84132 ASSAY OF SERUM POTASSIUM: CPT

## 2021-05-11 PROCEDURE — 2140000000 HC CCU INTERMEDIATE R&B

## 2021-05-11 PROCEDURE — 99232 SBSQ HOSP IP/OBS MODERATE 35: CPT | Performed by: NURSE PRACTITIONER

## 2021-05-11 PROCEDURE — 2500000003 HC RX 250 WO HCPCS: Performed by: NURSE PRACTITIONER

## 2021-05-11 RX ORDER — POTASSIUM CHLORIDE 20 MEQ/1
20 TABLET, EXTENDED RELEASE ORAL PRN
Status: DISCONTINUED | OUTPATIENT
Start: 2021-05-11 | End: 2021-05-14 | Stop reason: HOSPADM

## 2021-05-11 RX ORDER — ATORVASTATIN CALCIUM 80 MG/1
80 TABLET, FILM COATED ORAL NIGHTLY
Status: DISCONTINUED | OUTPATIENT
Start: 2021-05-11 | End: 2021-05-14 | Stop reason: HOSPADM

## 2021-05-11 RX ORDER — ASPIRIN 81 MG/1
81 TABLET ORAL DAILY
Status: DISCONTINUED | OUTPATIENT
Start: 2021-05-12 | End: 2021-05-14 | Stop reason: HOSPADM

## 2021-05-11 RX ORDER — ACETAMINOPHEN 325 MG/1
650 TABLET ORAL EVERY 4 HOURS PRN
Status: DISCONTINUED | OUTPATIENT
Start: 2021-05-11 | End: 2021-05-14 | Stop reason: HOSPADM

## 2021-05-11 RX ORDER — MORPHINE SULFATE 2 MG/ML
2 INJECTION, SOLUTION INTRAMUSCULAR; INTRAVENOUS EVERY 4 HOURS PRN
Status: DISCONTINUED | OUTPATIENT
Start: 2021-05-11 | End: 2021-05-14 | Stop reason: HOSPADM

## 2021-05-11 RX ORDER — PANTOPRAZOLE SODIUM 40 MG/1
40 TABLET, DELAYED RELEASE ORAL DAILY
Status: DISCONTINUED | OUTPATIENT
Start: 2021-05-11 | End: 2021-05-14 | Stop reason: HOSPADM

## 2021-05-11 RX ORDER — BISACODYL 10 MG
10 SUPPOSITORY, RECTAL RECTAL DAILY PRN
Status: DISCONTINUED | OUTPATIENT
Start: 2021-05-11 | End: 2021-05-12

## 2021-05-11 RX ORDER — FERROUS SULFATE 325(65) MG
325 TABLET ORAL 2 TIMES DAILY WITH MEALS
Status: DISCONTINUED | OUTPATIENT
Start: 2021-05-11 | End: 2021-05-14 | Stop reason: HOSPADM

## 2021-05-11 RX ORDER — SENNA AND DOCUSATE SODIUM 50; 8.6 MG/1; MG/1
1 TABLET, FILM COATED ORAL 2 TIMES DAILY
Status: DISCONTINUED | OUTPATIENT
Start: 2021-05-11 | End: 2021-05-14 | Stop reason: HOSPADM

## 2021-05-11 RX ORDER — LISINOPRIL 10 MG/1
10 TABLET ORAL DAILY
Status: DISCONTINUED | OUTPATIENT
Start: 2021-05-11 | End: 2021-05-14 | Stop reason: HOSPADM

## 2021-05-11 RX ORDER — ASCORBIC ACID 500 MG
500 TABLET ORAL 2 TIMES DAILY
Status: DISCONTINUED | OUTPATIENT
Start: 2021-05-11 | End: 2021-05-14 | Stop reason: HOSPADM

## 2021-05-11 RX ORDER — AMLODIPINE BESYLATE 10 MG/1
10 TABLET ORAL DAILY
Status: DISCONTINUED | OUTPATIENT
Start: 2021-05-11 | End: 2021-05-14 | Stop reason: HOSPADM

## 2021-05-11 RX ORDER — HYDROCODONE BITARTRATE AND ACETAMINOPHEN 5; 325 MG/1; MG/1
2 TABLET ORAL EVERY 4 HOURS PRN
Status: DISCONTINUED | OUTPATIENT
Start: 2021-05-11 | End: 2021-05-14 | Stop reason: HOSPADM

## 2021-05-11 RX ORDER — HYDROCODONE BITARTRATE AND ACETAMINOPHEN 5; 325 MG/1; MG/1
1 TABLET ORAL EVERY 4 HOURS PRN
Status: DISCONTINUED | OUTPATIENT
Start: 2021-05-11 | End: 2021-05-14 | Stop reason: HOSPADM

## 2021-05-11 RX ORDER — FOLIC ACID 1 MG/1
1 TABLET ORAL DAILY
Status: DISCONTINUED | OUTPATIENT
Start: 2021-05-11 | End: 2021-05-14 | Stop reason: HOSPADM

## 2021-05-11 RX ORDER — HYDRALAZINE HYDROCHLORIDE 20 MG/ML
10 INJECTION INTRAMUSCULAR; INTRAVENOUS EVERY 4 HOURS PRN
Status: DISCONTINUED | OUTPATIENT
Start: 2021-05-11 | End: 2021-05-14 | Stop reason: HOSPADM

## 2021-05-11 RX ADMIN — Medication 2000 MG: at 05:21

## 2021-05-11 RX ADMIN — INSULIN LISPRO 3 UNITS: 100 INJECTION, SOLUTION INTRAVENOUS; SUBCUTANEOUS at 08:09

## 2021-05-11 RX ADMIN — PANTOPRAZOLE SODIUM 40 MG: 40 TABLET, DELAYED RELEASE ORAL at 08:23

## 2021-05-11 RX ADMIN — FOLIC ACID 1 MG: 1 TABLET ORAL at 20:32

## 2021-05-11 RX ADMIN — CLEVIPIDINE 12 MG/HR: 0.5 EMULSION INTRAVENOUS at 08:09

## 2021-05-11 RX ADMIN — HYDROCODONE BITARTRATE AND ACETAMINOPHEN 2 TABLET: 5; 325 TABLET ORAL at 23:27

## 2021-05-11 RX ADMIN — AMLODIPINE BESYLATE 10 MG: 10 TABLET ORAL at 08:23

## 2021-05-11 RX ADMIN — MAGNESIUM HYDROXIDE 30 ML: 2400 SUSPENSION ORAL at 20:32

## 2021-05-11 RX ADMIN — CLEVIPIDINE 10 MG/HR: 0.5 EMULSION INTRAVENOUS at 03:51

## 2021-05-11 RX ADMIN — OXYCODONE HYDROCHLORIDE 10 MG: 10 TABLET ORAL at 07:52

## 2021-05-11 RX ADMIN — Medication 2000 MG: at 23:27

## 2021-05-11 RX ADMIN — Medication 10 ML: at 09:00

## 2021-05-11 RX ADMIN — CLOPIDOGREL 75 MG: 75 TABLET, FILM COATED ORAL at 08:23

## 2021-05-11 RX ADMIN — FENTANYL CITRATE 25 MCG: 50 INJECTION, SOLUTION INTRAMUSCULAR; INTRAVENOUS at 03:49

## 2021-05-11 RX ADMIN — FENTANYL CITRATE 25 MCG: 50 INJECTION, SOLUTION INTRAMUSCULAR; INTRAVENOUS at 09:48

## 2021-05-11 RX ADMIN — OXYCODONE HYDROCHLORIDE AND ACETAMINOPHEN 500 MG: 500 TABLET ORAL at 20:32

## 2021-05-11 RX ADMIN — IPRATROPIUM BROMIDE AND ALBUTEROL SULFATE 1 AMPULE: .5; 3 SOLUTION RESPIRATORY (INHALATION) at 21:32

## 2021-05-11 RX ADMIN — SENNOSIDES AND DOCUSATE SODIUM 1 TABLET: 8.6; 5 TABLET ORAL at 20:31

## 2021-05-11 RX ADMIN — IPRATROPIUM BROMIDE AND ALBUTEROL SULFATE 1 AMPULE: .5; 3 SOLUTION RESPIRATORY (INHALATION) at 10:00

## 2021-05-11 RX ADMIN — HYDRALAZINE HYDROCHLORIDE 10 MG: 20 INJECTION INTRAMUSCULAR; INTRAVENOUS at 16:25

## 2021-05-11 RX ADMIN — LISINOPRIL 10 MG: 10 TABLET ORAL at 10:51

## 2021-05-11 RX ADMIN — ASPIRIN 81 MG: 81 TABLET, COATED ORAL at 08:23

## 2021-05-11 RX ADMIN — PANTOPRAZOLE SODIUM 40 MG: 40 TABLET, DELAYED RELEASE ORAL at 20:32

## 2021-05-11 RX ADMIN — Medication 10 ML: at 20:31

## 2021-05-11 RX ADMIN — DOCUSATE SODIUM 50 MG AND SENNOSIDES 8.6 MG 1 TABLET: 8.6; 5 TABLET, FILM COATED ORAL at 08:23

## 2021-05-11 RX ADMIN — CLEVIPIDINE 14 MG/HR: 0.5 EMULSION INTRAVENOUS at 06:18

## 2021-05-11 RX ADMIN — HYDRALAZINE HYDROCHLORIDE 10 MG: 20 INJECTION INTRAMUSCULAR; INTRAVENOUS at 13:02

## 2021-05-11 RX ADMIN — Medication 2000 MG: at 13:00

## 2021-05-11 RX ADMIN — OXYCODONE HYDROCHLORIDE 10 MG: 10 TABLET ORAL at 11:52

## 2021-05-11 RX ADMIN — ATORVASTATIN CALCIUM 80 MG: 80 TABLET, FILM COATED ORAL at 23:27

## 2021-05-11 RX ADMIN — HYDROCODONE BITARTRATE AND ACETAMINOPHEN 2 TABLET: 5; 325 TABLET ORAL at 18:39

## 2021-05-11 RX ADMIN — TAMSULOSIN HYDROCHLORIDE 0.4 MG: 0.4 CAPSULE ORAL at 08:23

## 2021-05-11 RX ADMIN — MORPHINE SULFATE 2 MG: 2 INJECTION, SOLUTION INTRAMUSCULAR; INTRAVENOUS at 20:31

## 2021-05-11 RX ADMIN — IPRATROPIUM BROMIDE AND ALBUTEROL SULFATE 1 AMPULE: .5; 3 SOLUTION RESPIRATORY (INHALATION) at 16:43

## 2021-05-11 RX ADMIN — MUPIROCIN: 20 OINTMENT TOPICAL at 08:26

## 2021-05-11 RX ADMIN — MAGNESIUM GLUCONATE 500 MG ORAL TABLET 400 MG: 500 TABLET ORAL at 08:23

## 2021-05-11 RX ADMIN — CLEVIPIDINE 6 MG/HR: 0.5 EMULSION INTRAVENOUS at 12:21

## 2021-05-11 RX ADMIN — FENTANYL CITRATE 25 MCG: 50 INJECTION, SOLUTION INTRAMUSCULAR; INTRAVENOUS at 00:04

## 2021-05-11 RX ADMIN — BISACODYL 5 MG: 5 TABLET, COATED ORAL at 20:32

## 2021-05-11 RX ADMIN — INSULIN LISPRO 3 UNITS: 100 INJECTION, SOLUTION INTRAVENOUS; SUBCUTANEOUS at 04:08

## 2021-05-11 RX ADMIN — CLEVIPIDINE 1 MG/HR: 0.5 EMULSION INTRAVENOUS at 00:35

## 2021-05-11 RX ADMIN — IPRATROPIUM BROMIDE AND ALBUTEROL SULFATE 1 AMPULE: .5; 3 SOLUTION RESPIRATORY (INHALATION) at 14:28

## 2021-05-11 RX ADMIN — MUPIROCIN: 20 OINTMENT TOPICAL at 23:27

## 2021-05-11 ASSESSMENT — PAIN DESCRIPTION - PROGRESSION
CLINICAL_PROGRESSION: RESOLVED
CLINICAL_PROGRESSION: GRADUALLY WORSENING
CLINICAL_PROGRESSION: GRADUALLY IMPROVING
CLINICAL_PROGRESSION: RAPIDLY IMPROVING

## 2021-05-11 ASSESSMENT — PAIN DESCRIPTION - ORIENTATION: ORIENTATION: MID

## 2021-05-11 ASSESSMENT — PAIN SCALES - GENERAL
PAINLEVEL_OUTOF10: 4
PAINLEVEL_OUTOF10: 9
PAINLEVEL_OUTOF10: 0
PAINLEVEL_OUTOF10: 0
PAINLEVEL_OUTOF10: 3
PAINLEVEL_OUTOF10: 0
PAINLEVEL_OUTOF10: 9

## 2021-05-11 ASSESSMENT — PAIN DESCRIPTION - DESCRIPTORS: DESCRIPTORS: ACHING;SORE

## 2021-05-11 ASSESSMENT — PAIN DESCRIPTION - FREQUENCY: FREQUENCY: CONTINUOUS

## 2021-05-11 ASSESSMENT — PAIN - FUNCTIONAL ASSESSMENT: PAIN_FUNCTIONAL_ASSESSMENT: PREVENTS OR INTERFERES SOME ACTIVE ACTIVITIES AND ADLS

## 2021-05-11 NOTE — PLAN OF CARE
Problem: Cardiac Output - Decreased:  Goal: Hemodynamic stability will improve  Description: Hemodynamic stability will improve  5/11/2021 0016 by Cielo Burgos RN  Outcome: Ongoing  5/10/2021 2104 by Cielo Burgos RN  Outcome: Ongoing  5/10/2021 1819 by Ryan Myrick RN  Outcome: Met This Shift     Problem: Falls - Risk of:  Goal: Will remain free from falls  Description: Will remain free from falls  5/11/2021 0016 by Cielo Burgos RN  Outcome: Ongoing  5/10/2021 2104 by Cielo Burgos RN  Outcome: Ongoing  5/10/2021 1819 by Ryan Myrick RN  Outcome: Met This Shift  Goal: Absence of physical injury  Description: Absence of physical injury  5/11/2021 0016 by Cielo Burgos RN  Outcome: Ongoing  5/10/2021 2104 by Cielo Burgos RN  Outcome: Ongoing  5/10/2021 1819 by Ryan Myrick RN  Outcome: Met This Shift     Problem: Skin Integrity:  Goal: Will show no infection signs and symptoms  Description: Will show no infection signs and symptoms  5/11/2021 0016 by Cielo Burgos RN  Outcome: Ongoing  5/10/2021 2104 by Cielo Burgos RN  Outcome: Ongoing  5/10/2021 1819 by Ryan Myrick RN  Outcome: Met This Shift  Goal: Absence of new skin breakdown  Description: Absence of new skin breakdown  5/11/2021 0016 by Cieol Burgos RN  Outcome: Ongoing  5/10/2021 2104 by Cielo Burgos RN  Outcome: Ongoing  5/10/2021 1819 by Ryan Myrick RN  Outcome: Met This Shift     Problem: Discharge Planning:  Goal: Discharged to appropriate level of care  Description: Discharged to appropriate level of care  5/11/2021 0016 by Cielo Burgos RN  Outcome: Ongoing  5/10/2021 2104 by Cielo Burgos RN  Outcome: Ongoing     Problem:  Activity Intolerance:  Goal: Able to perform prescribed physical activity  Description: Able to perform prescribed physical activity  5/11/2021 0016 by Cielo Burgos RN  Outcome: Ongoing  5/10/2021 2104 by Cielo Burgos RN  Outcome: Ongoing  Goal: Ability to tolerate increased activity will improve  Description: Ability to tolerate increased activity will improve  5/11/2021 0016 by Olivia Cleveland RN  Outcome: Ongoing  5/10/2021 2104 by Olivia Cleveland RN  Outcome: Ongoing     Problem: Anxiety:  Goal: Level of anxiety will decrease  Description: Level of anxiety will decrease  5/11/2021 0016 by Olivia Cleveland RN  Outcome: Ongoing  5/10/2021 2104 by Olivia Cleveland RN  Outcome: Ongoing     Problem: Cardiac Output - Decreased:  Goal: Cardiac output within specified parameters  Description: Cardiac output within specified parameters  5/11/2021 0016 by Olivia Cleveland RN  Outcome: Ongoing  5/10/2021 2104 by Olivia Cleveland RN  Outcome: Ongoing  5/10/2021 1819 by Akila Rojo RN  Outcome: Met This Shift     Problem: Fluid Volume - Imbalance:  Goal: Ability to achieve a balanced intake and output will improve  Description: Ability to achieve a balanced intake and output will improve  5/11/2021 0016 by Olivia Cleveland RN  Outcome: Ongoing  5/10/2021 2104 by Olivia Cleveland RN  Outcome: Ongoing  Goal: Chest tube drainage is within specified parameters  Description: Chest tube drainage is within specified parameters  5/11/2021 0016 by Olivia Cleveland RN  Outcome: Ongoing  5/10/2021 2104 by Olivia Cleveland RN  Outcome: Ongoing  5/10/2021 1819 by Akila Rojo RN  Outcome: Met This Shift     Problem: Gas Exchange - Impaired:  Goal: Levels of oxygenation will improve  Description: Levels of oxygenation will improve  5/11/2021 0016 by Olivia Cleveland RN  Outcome: Ongoing  5/10/2021 2104 by Olivia Cleveland RN  Outcome: Ongoing  5/10/2021 1819 by Akila Rojo RN  Outcome: Met This Shift  Goal: Ability to maintain adequate ventilation will improve  Description: Ability to maintain adequate ventilation will improve  5/11/2021 0016 by Olivia Cleveland RN  Outcome: Ongoing  5/10/2021 2104 by Olivia Cleveland RN  Outcome: Ongoing  5/10/2021 1819 by Derian Olson RN  Outcome: Met This Shift     Problem: Pain:  Goal: Pain level will decrease  Description: Pain level will decrease  5/11/2021 0016 by Jeffery Wahl RN  Outcome: Ongoing  5/10/2021 2104 by Jeffery Wahl RN  Outcome: Ongoing  5/10/2021 1819 by Derian Olson RN  Outcome: Ongoing  Goal: Control of acute pain  Description: Control of acute pain  5/11/2021 0016 by Jeffery Wahl RN  Outcome: Ongoing  5/10/2021 2104 by Jeffery Wahl RN  Outcome: Ongoing  5/10/2021 1819 by Derian Olson RN  Outcome: Ongoing  Goal: Control of chronic pain  Description: Control of chronic pain  5/11/2021 0016 by Jeffery Wahl RN  Outcome: Ongoing  5/10/2021 2104 by Jeffery Wahl RN  Outcome: Ongoing     Problem: Tissue Perfusion - Cardiopulmonary, Altered:  Goal: Absence of angina  Description: Absence of angina  5/11/2021 0016 by Jeffery Wahl RN  Outcome: Ongoing  5/10/2021 2104 by Jeffery Wahl RN  Outcome: Ongoing  Goal: Will show no evidence of cardiac arrhythmias  Description: Will show no evidence of cardiac arrhythmias  5/11/2021 0016 by Jeffery Wahl RN  Outcome: Ongoing  5/10/2021 2104 by Jeffery Wahl RN  Outcome: Ongoing     Problem: Tobacco Use:  Goal: Will participate in inpatient tobacco-use cessation counseling  Description: Will participate in inpatient tobacco-use cessation counseling  5/11/2021 0016 by Jeffery Wahl RN  Outcome: Ongoing  5/10/2021 2104 by Jeffery Wahl RN  Outcome: Ongoing     Problem: Pain:  Goal: Pain level will decrease  Description: Pain level will decrease  5/11/2021 0016 by Jeffery Wahl RN  Outcome: Ongoing  5/10/2021 2104 by Jeffery Wahl RN  Outcome: Ongoing  5/10/2021 1819 by Derian Olson RN  Outcome: Ongoing  Goal: Control of acute pain  Description: Control of acute pain  5/11/2021 0016 by Jeffery Wahl RN  Outcome: Ongoing  5/10/2021 2104 by Bianca Proctor SANTIAGO Murillo  Outcome: Ongoing  Goal: Control of chronic pain  Description: Control of chronic pain  5/11/2021 0016 by Jose Lopez RN  Outcome: Ongoing  5/10/2021 2104 by Jose Lopez RN  Outcome: Ongoing

## 2021-05-11 NOTE — FLOWSHEET NOTE
Right brachial arterial line and right IJ introducer removed. Pressure held for 5 minutes each, pressure dressings applied. Both catheters intact. No bleeding / swelling / hematoma. Vergara catheter also removed at 1700. No complications. Pt due to void within 8 hours.

## 2021-05-11 NOTE — CARE COORDINATION
Pod#1 s/p cabgx3, Lad endarterectomy. Met with pt and wife at bedside. Plan is to return home at discharge. Since they live in a 2 story home and wife works, they plan to stay with pt's mom who has a 1 story home. Her address is [address] [place] [address]. [place] [phone]. Pt's mom is retired and will be with him when wife works. Discussed c. They are aware there will be a cost with [facility]. They are agreeable to referral to [facility]. Referral made to [facility]. Brandon will faxed [facility] financial assistance forms that I  provided to wife to complete.

## 2021-05-11 NOTE — FLOWSHEET NOTE
Report given to nurse on PCCU. Pt belongings gathered. Transport called. Pt to be transferred to room 6523. Wife and mother informed.

## 2021-05-11 NOTE — PROGRESS NOTES
CVICU Progress Note    Name: Naz Barragan  MRN: [id number]    CC: Postoperative Critical Care Management     Indication for Surgery/Procedure: CAD  LVEF:  45%    RVF:  Normal      Important/Relevant PMH/PSH: HTN, HLD, tobacco abuse, PVD, sinus bradycardia      Procedure/Surgeries: 5/10/2021 CABG x 3 (LIMA-LAD, SVG-OM, SVG-PDA)  Endarterectomy LAD   [facility]  KLS Plating     Pacing wires: Ventricular      Intake/Output Summary (Last 24 hours) at 5/11/2021 0753  Last data filed at 5/11/2021 0700  Gross per 24 hour   Intake 2739.58 ml   Output 3865 ml   Net -1125.42 ml       Recent Labs     05/10/21  0712 05/10/21  1715 05/11/21  0401   WBC 7.0 12.6* 15.2*   HGB 15.7 11.2* 12.9   HCT 47.4 34.2* 38.7    199 310      Recent Labs     05/10/21  0712 05/10/21  0712 05/10/21  1715 05/10/21  2020 05/11/21  0149 05/11/21  0401     --  138  --   --  139   K 4.3  4.3   < > 4.4 4.58 5.2* 4.7     --  106  --   --  104   CO2 23  --  26  --   --  24   BUN 15  --  16  --   --  14   CREATININE 1.1  --  1.4*  --   --  1.0   GLUCOSE 103*  --  123*  --   --  166*   CALCIUM 10.0  --  8.3*  --   --  9.4    < > = values in this interval not displayed. Physical Exam:    BP (!) 143/79   Pulse 66   Temp 98.8 °F (37.1 °C) (Bladder)   Resp (!) 35   Ht 5' 11\" (1.803 m)   Wt 230 lb 9.6 oz (104.6 kg)   SpO2 96%   BMI 32.16 kg/m²       CXR Findings: 5/11/2021      Impression:     1. Stable position of the left chest tube.  There is no pneumothorax   2. Patchy bilateral airspace disease   3. Stable position of the 2 mediastinal tubes. ---CXR personally viewed and interpreted by ICU Nurse Practitioner, agree with above findings     General: Awake, alert. Feeling SOB with exertion    Eyes: PERRL, anicteric   Pulmonary: Diminished bibasilar.  No wheezes, no accessory muscle use noted on 5L NC  Cardiovascular:  RRR, no heaves or thrills on palpation  Tele: SR  Abdomen: Soft, nontender, + BS   Extremities: Palpable pulses all

## 2021-05-11 NOTE — PROGRESS NOTES
Inpatient Cardiology Progress note     PATIENT IS BEING FOLLOWED FOR: Cardiology follow-up post CABG    Richi Simeon is a 45 y.o. AAM seen in initial consultation by Dr Nikolas Reyes 5/2/2021     SUBJECTIVE: Complains of incisional pain and some SOB  OBJECTIVE: No apparent distress while resting in bed    ROS:  Consist: Denies fevers, chills or night sweats  Heart: + Incisional pain. Denies chest pain, palpitations, lightheadedness, dizziness or syncope  Lungs: + SOB. Denies cough, wheezing, orthopnea or PND  GI: Denies abdominal pain, vomiting or diarrhea    PHYSICAL EXAM:   BP (!) 143/79   Pulse 66   Temp 98.8 °F (37.1 °C) (Bladder)   Resp (!) 35   Ht 5' 11\" (1.803 m)   Wt 230 lb 9.6 oz (104.6 kg)   SpO2 96%   BMI 32.16 kg/m²    B/P Range last 24 hours: Systolic (88WFJ), ZNI:452 , Min:123 , HII:609    Diastolic (33FEH), RQK:94, Min:46, Max:84    CONST: Well developed, obese AAM who appears stated age. Awake, alert and cooperative. No apparent distress  HEENT:   Head- Normocephalic, atraumatic   Eyes- Conjunctivae pink, anicteric  Throat- Oral mucosa pink and moist  Neck-  No stridor, trachea midline, no jugular venous distention. No carotid bruit. R IJ introducer. CHEST: Chest symmetrical and non-tender to palpation. No accessory muscle use or intercostal retractions. NOLBERTO dressing D&I. CT ( L and mediastinal)  to suction. RESPIRATORY:  Lung sounds - diminished in bases due to poor inspiratory effort on 5 liters NC.   CARDIOVASCULAR:     Heart Ausculation- Regular rate and rhythm, no murmur. No s3, s4 or rub   PV: No lower extremity edema. No varicosities. Pedal pulses palpable, no clubbing or cyanosis. RLE ACE wrap. + Janelle  ABDOMEN: Soft, non-tender to light palpation. Bowel sounds present. No palpable masses; no abdominal bruit  MS: Good muscle strength and tone. No atrophy or abnormal movements.    : Vergara clear yellow urine  SKIN: Warm and dry no statis dermatitis or ulcers   NEURO / PSYCH: Oriented to person, place and time. Speech clear and appropriate. Follows all commands. Pleasant affect       Intake/Output Summary (Last 24 hours) at 5/11/2021 0728  Last data filed at 5/11/2021 0700  Gross per 24 hour   Intake 2739.58 ml   Output 4365 ml   Net -1625.42 ml       Weight:   Wt Readings from Last 3 Encounters:   05/11/21 230 lb 9.6 oz (104.6 kg)   10/14/19 242 lb (109.8 kg)   03/01/19 238 lb (108 kg)     Current Inpatient Medications:   sodium chloride flush  10 mL Intravenous 2 times per day    aspirin  81 mg Oral Daily    chlorhexidine  15 mL Mouth/Throat BID    magnesium oxide  400 mg Oral Daily    mupirocin   Nasal BID    sennosides-docusate sodium  1 tablet Oral BID    pantoprazole  40 mg Oral Daily    ceFAZolin (ANCEF) IVPB  2,000 mg Intravenous Q8H    ipratropium-albuterol  1 ampule Inhalation Q4H WA    clopidogrel  75 mg Oral Daily    insulin lispro  0-3 Units Subcutaneous Q4H    tamsulosin  0.4 mg Oral Daily    senna  1 tablet Oral Nightly    atorvastatin  40 mg Oral Nightly       IV Infusions (if any):   sodium chloride 30 mL/hr (05/10/21 1715)    sodium chloride      propofol Stopped (05/10/21 1829)    sodium chloride      norepinephrine      clevidipine 12 mg/hr (05/11/21 0632)    insulin      dextrose         DIAGNOSTIC/ LABORATORY DATA:  Labs:   CBC:   Recent Labs     05/10/21  1715 05/11/21  0401   WBC 12.6* 15.2*   HGB 11.2* 12.9   HCT 34.2* 38.7    310     BMP:   Recent Labs     05/10/21  1715 05/10/21  1715 05/11/21  0149 05/11/21  0401     --   --  139   K 4.4   < > 5.2* 4.7   CO2 26  --   --  24   BUN 16  --   --  14   CREATININE 1.4*  --   --  1.0   LABGLOM >60  --   --  >60   CALCIUM 8.3*  --   --  9.4    < > = values in this interval not displayed.      Mag:   Recent Labs     05/10/21  1715 05/11/21  0401   MG 2.6 2.2     TFT:   Lab Results   Component Value Date    TSH 0.489 05/03/2021    T4FREE 1.21 05/03/2021      HgA1c:   Lab Results   Component Value Date    LABA1C 5.8 (H) 05/02/2021     PT/INR:   Recent Labs     05/09/21  0633 05/10/21  1715   PROTIME 12.8* 15.0*   INR 1.2 1.4     APTT:  Recent Labs     05/10/21  0712 05/10/21  1715   APTT 50.7* 39.3*     FASTING LIPID PANEL:  Lab Results   Component Value Date    CHOL 230 05/02/2021    HDL 37 05/02/2021    LDLCALC 177 05/02/2021    TRIG 80 05/02/2021       CXR 5/10/2021: Status post recent CABG.  Mild pulmonary vascular congestion.  No   consolidation. The life-support lines and tubes are well positioned. Telemetry: SR 60's    LHC 5/3/2021 Dr Corbin Escalante: Severe MV CAD. EF 40%    TTE 5/5/2021 Dr Moreno:Left ventricle is normal in size . Moderate to severe concentric left ventricular hypertrophy. There is apical wall thinning an akinesis. EF visually estimated at 45-50%. Stage I DD. Normal right ventricular size and function. Normal sized left atrium. No valvular abnormalities. ASSESSMENT:   1. NSTEMI--> MV CAD s/p CABG x LIMA-LAD, SVG-OM, SVG-PDA 5/10/2021  2. HTN/Moderate to severe LVH requiring CLeviprex  3. Hypercholesterolemia (with low HDL), on statin    4. Tobacco abuse. 5. Sinus bradycardia, reason why not on BB   6. Obesity.        7. Acute hypoxic respiratory failure                 PLAN:  1. Immediate post-op care per CTS  2. IS encouraged  3. Cardiology to follow prn, please call if needed    Discussed with Dr Tariq You    Electronically signed by Joanne Hinojosa.  VICKI Vinson on 5/11/2021 at 7:28 AM Yes

## 2021-05-11 NOTE — OP NOTE
[address]                  [address] [facility],  [facility]                                OPERATIVE REPORT    PATIENT NAME: Mayuri Jara                       :        1970  MED REC NO:   [id number]                            ROOM:       [facility]  ACCOUNT NO:   [de-identified]                           ADMIT DATE: 2021  PROVIDER:     Rip Ross DO    DATE OF PROCEDURE:  05/10/2021    PREOPERATIVE DIAGNOSIS:  Severe multivessel coronary artery disease. POSTOPERATIVE DIAGNOSIS:  Severe multivessel coronary artery disease. PROCEDURES PERFORMED:  1. Coronary artery bypass grafting x3 using the left internal mammary  artery to left anterior descending artery, reverse saphenous vein graft  to the dominant obtuse marginal branch of the circumflex and reverse  saphenous vein graft to the posterior descending artery. 2.  Endarterectomy of the LAD. 3.  Lower extremity endoscopic vein harvest.  4.  Rigid sternal fixation with the KLS plating system. SURGEON:  Rip Ross DO    ASSISTANTS:  GELA Dickson (assisted with all portions of the  procedure in the chest as well as closing). Ananth Self (endoscopic  vein harvest). ANESTHESIA:  General.    ESTIMATED BLOOD LOSS:  Less than 50. COMPLICATIONS:  None. SPECIMENS:  LAD endarterectomy/thrombus. DESCRIPTION OF THE PROCEDURE:  After informed consent had been obtained,  the patient was brought to the operating room, placed in the supine  position on the operating table. Monitoring lines as well as Vergara  catheter and transesophageal echo probe were inserted after induction of  anesthesia. Preoperative echo demonstrated normal biventricular  function, no valvular abnormalities. Surgical time-out was held. Preoperative antibiotics administered and a standard midline sternotomy  incision was carried out.   The subcutaneous tissue was dissected and the  sternum was divided with a sternal saw. Simultaneously, lower extremity  endoscopic vein harvest was carried out in the usual fashion. The left  internal mammary artery was dissected free from the undersurface of the  left chest wall with a pedicled technique. All branches were clipped  and divided. The patient was then administered systemic heparin for an  adequate ACT for bypass. The distal portion of the left internal  mammary artery was clipped and divided and it was noted to be a good  conduit for bypass. Next, the mammary retractor was removed, standard  sternal retractor was placed and the pericardium was opened. The  ascending aorta was palpated and noted be free of atherosclerotic  disease. Suitable areas for cannulation, crossclamping, and proximal  anastomoses were apparent. Next, central cannulation was commenced in  the usual fashion of ascending aorta and right atrial cannulas followed  by insertion of antegrade and retrograde cardioplegia lines. The ACT  was verified, the patient was placed on cardiopulmonary bypass. The  distal targets were inspected and noted to be adequate. The crossclamp  was applied. The heart was arrested with a combination of antegrade and  retrograde cardioplegia. We achieved an excellent arrest and also  administered maintenance doses of retrograde cardioplegia via the  retrograde plegia cannula every 15 minutes. First, our attention was  turned to the lateral wall, where there were two similarly sized very  close together obtuse marginal branches of the circumflex. The one that  appeared to be dominant was chosen for grafting and after arteriotomy an  end-to-side vein graft anastomosis was created here with a running 7-0  Prolene suture. This was tested and had an excellent flow and also was  hemostatic. Next, our attention was turned to the right coronary artery  system, which was noted to be completely occluded.   The spot on the  posterior descending artery was entered with a standard arteriotomy. This was noted to be quite calcified and then needed to be extended  proximally and distally to traverse a very tight lesion in order to find  the lumen. After this, the distal portion of the vein graft was then  spatulated to match up with the very long anastomosis. This was then  carried out with running 6-0 Prolene suture. This was tested and had  adequate flow and was hemostatic. Next, our attention was turned to the  midportion of the LAD. After arteriotomy, we noted that there was a  clot/thrombus associated with some heavy plaque burden in the LAD. This  was all removed in a standard endarterectomy at the area of the  anastomosis. This was then sent off to pathology. Satisfied with that  the probe passed both proximally and distally in the LAD very easily. Next, the LIMA to LAD anastomosis was created here with a running 7-0  Prolene suture. This was tested by removing the bulldog clamp from the  left internal mammary artery, we noted excellent flow and excellent  runoff into the distal vessel and adjacent diagonal branches. Next, our  attention was turned to the proximal anastomoses. Two aortotomies were  fashioned in the ascending aorta with 4.8 mm punch. The vein grafts  were then measured to length and the proximal anastomoses were carried  out with running 6-0 Prolene suture. Once this was completed, a warm  dose of blood was administered via the retrograde cardioplegia cannula  as a \"hot shot. \"  The bulldog clamp was then removed from the left  internal mammary artery and crossclamp removed from the aorta. The  heart began to reanimate immediately. A temporary ventricular pacing  wire was placed; however, not used secondary to the patient being in  normal sinus rhythm. Once all evidence of intracardiac air was gone,  the aortic root vent and retrograde cardioplegia cannulas were both  removed.   Post-bypass echo demonstrated once again normal biventricular function and no valvular abnormalities. Hemostasis was achieved and  then four chest tubes were inserted, one in the left pleural space, one  in the right pleural, and two in the mediastinal space. Sponge, needle  and instrument counts were noted to be correct and the sternum was  approximated with stainless steel wires. The wound was irrigated  copiously and then closed in multiple layers. The patient tolerated the  procedure well and was transferred to the ICU in stable condition. The  cardiopulmonary bypass time was 91 minutes and the crossclamp time was  73 minutes.         Gerhard Nielsen DO    D: 05/10/2021 16:58:44       T: 05/10/2021 17:02:46     [email or web address]  Job#: [id number]     Doc#: [id number]    CC:

## 2021-05-11 NOTE — PROGRESS NOTES
OCCUPATIONAL THERAPY INITIAL EVALUATION      Date:2021  Patient Name: Denice Darby  MRN: [id number]  : 1970  Room: [address] [place] [address]    Referring Provider:  Maura Ingram DO      Evaluating OT: Mercedes Sheth OTR/L [id number]    AM-PAC Daily Activity Raw Score:     Recommended Adaptive Equipment:  LB dressing AE, shower seat for energy conservation, raised commode as needed      Diagnosis: Severe multivessel coronary artery disease  Surgery/Procedures: 5/10 CABG x 3; Endarterectomy      Pertinent Medical History: CAD. HTN, HLD    Precautions:  Falls, sternal,O2, chest tubex2     Home Living: Pt lives with wife; will stay at mother's condo upon discharge: 1 story with 1+1 steps/no rail to enter. Bathroom setup: tub/shower with seat and rail; standard height commode  Equipment owned: shower seat    Prior Level of Function: IND with ADLs;  IND with IADLs. No device for ambulation. Driving: yes  Occupation: heavy lifting for work    Pain Level: pt c/o 9/10 chest pain  this session; s/p pain medication      Cognition: A&O: /    Follows 1-2 step commands appropriately.    Memory: Good   Comprehension Good   Problem solving: Fair+/Good   Judgement/safety: Fair+/Good               Communication skills: WFL           Vision: WFL               Glasses:yes; contact lenses                                                    Hearing: WFL     RASS: 0  CAM-ICU: (NT) Delirium    UE Assessment:  Hand Dominance: Right []  Left [x]     ROM Strength STM goal: PRN   RUE  Grossly WFL within precautions Not formally tested; grossly WFL              WNL for ADLS     LUE Grossly WFL within precautions Not formally tested; grossly WFL              WNL for ADLS       Sensation: No c/o numbness or tingling in extremities   Tone: WNL   Edema: [facility]     Functional Assessment   Initial Eval Status  Date:  Treatment Status  Date: STG=LTG  5-7 days   Feeding S; set up                       IND  while seated up in chair to increase activity tolerance        Grooming Mod A                       Crystal   while stadning sink level demonstrating G tolerance; G balance. UB dressing/bathing Max A                       Min A   demonstrating G knowledge of precautions during tasks     LB dressing/bathing Dep    Max A  after instruction on LB dressing AE for safe reach within precautions                       Min A  using AE as needed for safe reach/ energy conservation       Toileting NT                       Crystal     Bed Mobility  Supine to sit: Mod A+2 with HOB elevated    Sit to supine: NT                       Min A  in prep of ADL tasks & transfers   Functional Transfers Sit to stand: Mod A  from higher bed surface;  NT from lower chair surface    Stand to sit: Mod A onto lower chair                       Crystal  sit<>stand/functional bathroom transfers using AD/DME as needed for balance and safety   Functional Mobility Min A  no device                        Crystal   functional/bathroom mobility using AD as needed & demonstrating G safety     Balance Sitting:     Static:  SBA    Dynamic:Min A  Standing: Min A  Crystal dynamic sitting balance; Crystal dynamic standing balance  during ADL tasks & transfers   Endurance/Activity Tolerance   F tolerance with light activity. SOB/pain with activity. Pt instructed on sternal precautions and breathing techniques during ADLs.   G   tolerance with moderate activity/self care routine   Visual/  Perceptual               WFL                            Vitals:   HR at rest: 65 bpm HR at end of session: 67 bpm   Spo2 at rest:98% Spo2 at end of session 97%   BP at rest:155/63 mmHg BP at end of session 149/80 mmHg     Assessment of current deficits   [x]Functional mobility   [x]ADLs [x]Strength  []Cognition  [x]Functional transfers  [x]IADLs [x]Safety Awareness  [x]Endurance  []Fine Motor Coordination  [x]Balance      []Vision/perception  []Sensation    []Gross Motor Coordination  [x]ROM  []Delirium []Communication     Plan of Care: 1-3 days/week for 1-2 weeks PRN   ADL retraining/adapted techniques and AE recommendations to increase functional independence within precautions                    Energy conservation techniques to improve tolerance for selfcare routine   Functional transfer/mobility training/DME recommendations for increased independence, safety and fall prevention         Patient/family education to increase safety and functional independence             Environmental modifications for safe mobility and completion of ADLs                             Therapeutic activity to improve functional performance during ADLs. Therapeutic exercise to improve tolerance and functional strength for ADLs   Balance retraining/tolerance tasks for facilitation of postural control with dynamic challenges during ADLs . Treatment: OT intervention provided to achieve goals:   Functional mobility: Instruction on sternal precautions to facilitate safe bed mobility & functional transfers. Pt required 2 person assist for safe mobility due to complexity of medical condition, medical lines and deconditioning. ADL retraining: Instruction on adapted dressing techniques/equipment to maintain sternal precautions during ADLs. Pt required 2 person assist for safe mobility due to complexity of medical condition, medical lines and deconditioning. HOB elevated to assist with mobility. Energy Conservation training: Education on postural awareness/positioning and breathing techniques to improve overall tolerance and participation in self care routine. Pt demonstrated fair tolerance; limited by pain. Review of recommended DME for fall prevention, bathroom safety & energy conservation. Pt/Family Education: Provided with handouts on energy conservation and sternal precautions during functional activities for safe return home. Pt/family demonstrates G understanding.      Line management and environmental modifications made prior to and end of session to ensure patient safety and to increase efficiency of session. Skilled monitoring of HR, O2 saturation, blood pressure and patient's response to activity performed throughout session. Comments: OK from RN to see patient. Upon arrival, patient supine in bed, wife present. At end of session, patient left seated in chair with no complaints. Call light within reach, all lines and tubes intact. Pt instructed on use of call light for assistance and fall prevention. Patient presents with decreased activity tolerance, dynamic balance, functional mobility limiting completion of ADLs and safety. Pt can benefit from continued skilled OT to increase safety and functional independence. Evaluation Complexity: Moderate    · History: Expanded chart review of consults, imaging, and psychosocial history related to current functional performance. · Exam: 5+ performance deficits identified limiting functional independence and safe return home   · Assistance/Modification: Min/mod assistance or modifications required to perform tasks. May have comorbidities that affect occupational performance. Rehab Potential: Good for established goals    Patient / Family Goal: return to OF    Patient and/or family were instructed/educated on diagnosis, prognosis/goals and plan of care. Pt demonstrated G understanding. [] Malnutrition indicators have been identified and nursing has been notified to ensure a dietitian consult is ordered.       Time In:0910              Time Out: 0950       Total Treatment Time: 25          Min Units   OT Eval Low 13989     OT Eval Medium 91007 X    OT Eval High 88678     OT Re-Eval [id number]     Therapeutic Ex 77424     Therapeutic Activities 44706 15 1   ADL/Self Care 60214 10 1   Orthotic Management 15111     Neuro Re-Ed 69747     Non-Billable Time       Evaluation time includes thorough review of current medical

## 2021-05-11 NOTE — PLAN OF CARE
Problem: Falls - Risk of:  Goal: Will remain free from falls  Description: Will remain free from falls  5/11/2021 0717 by Soila James RN  Outcome: Met This Shift     Problem: Falls - Risk of:  Goal: Absence of physical injury  Description: Absence of physical injury  5/11/2021 0717 by Soila James RN  Outcome: Met This Shift     Problem: Skin Integrity:  Goal: Will show no infection signs and symptoms  Description: Will show no infection signs and symptoms  5/11/2021 0717 by Soila James RN  Outcome: Met This Shift     Problem: Skin Integrity:  Goal: Absence of new skin breakdown  Description: Absence of new skin breakdown  5/11/2021 0717 by Soila James RN  Outcome: Met This Shift     Problem: Cardiac Output - Decreased:  Goal: Cardiac output within specified parameters  Description: Cardiac output within specified parameters  5/11/2021 0717 by Soila James RN  Outcome: Met This Shift     Problem: Fluid Volume - Imbalance:  Goal: Ability to achieve a balanced intake and output will improve  Description: Ability to achieve a balanced intake and output will improve  5/11/2021 0717 by Soila James RN  Outcome: Met This Shift     Problem: Fluid Volume - Imbalance:  Goal: Chest tube drainage is within specified parameters  Description: Chest tube drainage is within specified parameters  5/11/2021 0717 by Soila James RN  Outcome: Met This Shift     Problem: Gas Exchange - Impaired:  Goal: Ability to maintain adequate ventilation will improve  Description: Ability to maintain adequate ventilation will improve  5/11/2021 0717 by Soila James RN  Outcome: Met This Shift     Problem: Pain:  Goal: Pain level will decrease  Description: Pain level will decrease  5/11/2021 0717 by Soila James RN  Outcome: Met This Shift     Problem: Pain:  Goal: Control of acute pain  Description: Control of acute pain  5/11/2021 0717 by Soila James RN  Outcome: Met This Shift     Problem: Pain:  Goal: Pain level will decrease  Description: Pain level will decrease  5/11/2021 0717 by Baron Kate RN  Outcome: Met This Shift     Problem: Cardiac Output - Decreased:  Goal: Hemodynamic stability will improve  Description: Hemodynamic stability will improve  5/11/2021 0717 by Baron Kate RN  Outcome: Ongoing     Problem: Gas Exchange - Impaired:  Goal: Levels of oxygenation will improve  Description: Levels of oxygenation will improve  5/11/2021 0717 by Baron Kate RN  Outcome: Ongoing

## 2021-05-11 NOTE — ANESTHESIA POSTPROCEDURE EVALUATION
Department of Anesthesiology  Postprocedure Note    Patient: Anastasia Rodriguez  MRN: [id number]  YOB: 1970  Date of evaluation: 5/11/2021  Time:  7:11 AM     Procedure Summary     Date: 05/10/21 Room / Location: [facility] 02 / [facility]    Anesthesia Start: 4329 Anesthesia Stop: 1605    Procedure: CABG CORONARY ARTERY BYPASS, PAULETTE (N/A ) Diagnosis: (/)    Surgeons: Renetta Merino DO Responsible Provider: Dennis Hughes DO    Anesthesia Type: general ASA Status: 4          Anesthesia Type: general    Penny Phase I: Penny Score: 8    Penny Phase II: Penny Score: 9    Last vitals: Reviewed and per EMR flowsheets.        Anesthesia Post Evaluation    Patient location during evaluation: ICU  Patient participation: complete - patient cannot participate  Level of consciousness: sedated and ventilated  Airway patency: patent  Nausea & Vomiting: no nausea and no vomiting  Complications: no  Cardiovascular status: blood pressure returned to baseline  Respiratory status: acceptable  Hydration status: euvolemic

## 2021-05-11 NOTE — PROGRESS NOTES
sign-off. Pt under management of Cardiothoracic Surgery. Please do not hesitate to re-consult us if needed. Thank you for allowing us to participate in the care of your patient. Medications:  REVIEWED DAILY    Infusion Medications    sodium chloride 30 mL/hr (05/11/21 0800)    sodium chloride      sodium chloride      dextrose       Scheduled Medications    amLODIPine  10 mg Oral Daily    lisinopril  10 mg Oral Daily    atorvastatin  80 mg Oral Nightly    sodium chloride flush  10 mL Intravenous 2 times per day    aspirin  81 mg Oral Daily    magnesium oxide  400 mg Oral Daily    mupirocin   Nasal BID    sennosides-docusate sodium  1 tablet Oral BID    pantoprazole  40 mg Oral Daily    ceFAZolin (ANCEF) IVPB  2,000 mg Intravenous Q8H    ipratropium-albuterol  1 ampule Inhalation Q4H WA    clopidogrel  75 mg Oral Daily    insulin lispro  0-3 Units Subcutaneous Q4H    tamsulosin  0.4 mg Oral Daily    senna  1 tablet Oral Nightly     PRN Meds: hydrALAZINE, sodium chloride flush, sodium chloride, ondansetron, acetaminophen, acetaminophen, oxyCODONE **OR** oxyCODONE, fentanNYL **OR** fentanNYL, magnesium hydroxide, potassium chloride, magnesium sulfate, albumin human, sodium chloride, glucose, dextrose, glucagon (rDNA), dextrose, calcium gluconate IVPB    Labs:     Recent Labs     05/10/21  0712 05/10/21  1715 05/11/21  0401   WBC 7.0 12.6* 15.2*   HGB 15.7 11.2* 12.9   HCT 47.4 34.2* 38.7    199 310       Recent Labs     05/10/21  0712 05/10/21  0712 05/10/21  1715 05/10/21  2020 05/11/21  0149 05/11/21  0401     --  138  --   --  139   K 4.3  4.3   < > 4.4 4.58 5.2* 4.7     --  106  --   --  104   CO2 23  --  26  --   --  24   BUN 15  --  16  --   --  14   CREATININE 1.1  --  1.4*  --   --  1.0   CALCIUM 10.0  --  8.3*  --   --  9.4    < > = values in this interval not displayed.        No results for input(s): PROT, ALB, ALKPHOS, ALT, AST, BILITOT, AMYLASE, LIPASE in the last

## 2021-05-11 NOTE — PROGRESS NOTES
Physical Therapy  Physical Therapy Initial Assessment     Name: Washington Silva  : 1970  MRN: [id number]    Referring Provider:  Kulwinder Cortés DO    Date of Service: 2021    Evaluating PT:  Calixto Agnieszka, PT, DPT [id number]    Room #:  8287/6655-Q  Diagnosis:  NSTEMI  Precautions: Falls, Sternal, O2, chest tube x 2, external pacemaker  Procedure/Surgery:  5/3 Left Heart Catheterization, coronary angiography, left ventriculography, 5/10 CABG x 3  PMHx/PSHx:  CAD, HLD, HTN  Equipment Needs:  TBD    SUBJECTIVE:  Pt lives with wife in a 3 story home with 4 stairs to enter. Upon discharge, pt will return to mother's house (1 story condo with 1 step to access). Pt ambulated with no device PTA. OBJECTIVE:   Initial Evaluation  Date: 2021 Treatment Short Term/ Long Term   Goals   AM-PAC 6 Clicks 96/57     Was pt agreeable to Eval/treatment? Yes     Does pt have pain? Yes, 9/10 chest pain, pain meds recently administered     Bed Mobility  Rolling: NT  Supine to sit: ModA x 2 with HOB elevated  Sit to supine: NT  Scooting: ModA  Mod. Independent   Transfers Sit to stand: ModA  Stand to sit: ModA  Stand pivot: Bhanu with no device   Independent no device   Ambulation   A few steps to bedside chair Bhanu no device  >300 feet with Independent no device   Stair negotiation: ascended and descended NT  >4 steps with 2 rail Mod.  Independent   ROM BUE:  Defer to OT note  BLE:  WFL     Strength BUE:  Defer to OT note  BLE:  4/5  Increase by 1/3 MMT grade   Balance Sitting EOB:  Bhanu dynamic  Dynamic Standing:  Bhanu with no device  Sitting EOB:  Independent  Dynamic Standing:  Independent no device     Pt is A & O x 3  CAM-ICU: NT  RASS: 0  Sensation:  No reported paresthesias  Edema:  None noted    Vitals:  Heart Rate at rest 65 bpm Heart Rate post session 67 bpm   SpO2 at rest 98% SpO2 post session 97%   Blood Pressure at rest 153/62 mmHg Blood Pressure post session 151/61 mmHg     Functional Status Score-Intensive Care Unit (FSS-ICU)   Rolling -/7   Supine to sit transfer 2/7   Unsupported sitting  4/7   Sit to stand transfers 3/7   Ambulation 1/7   Total  10/35       Therapeutic Exercises:  NA    Patient education  Pt educated on safety    Patient response to education:   Pt verbalized understanding Pt demonstrated skill Pt requires further education in this area   yes yes yes     ASSESSMENT:    Comments:  RN reported pt was stable for session. Pt was in bed upon arrival, agreeable to initial evaluation. Pt was educated on sternal precautions and PLB prior to activity. Pt initiated movement with BLE, but required assistance to reach EOB. Upon sitting, pt reported dizziness that improved with time - vitals WNL. Pt required increased assistance to stand from bed. Pt took short, shuffled steps to bedside chair. In sitting, pt reported fatigue and further mobility deferred. Increased time required during session due to dizziness. Pt was left in chair with all needs met and call light in reach. All lines remained intact. Wife present for session. Assistance of two required due to acuity of medical condition, complex medical lines management as well as overall deconditioning of patient. Treatment:  Patient practiced and was instructed in the following treatment:     Bed mobility training - pt given verbal and tactile cues to facilitate proper sequencing and safety during supine>sit as well as provided with physical assistance to complete task    Sitting EOB for >8 minutes for upright tolerance, postural awareness and BLE ROM   Transfer training - pt was given verbal and tactile cues to facilitate proper hand placement, technique and safety during sit to stand and stand to sit as well as provided with physical assistance to complete task.  Gait training- pt was given verbal and tactile cues to facilitate safety and balance during ambulation as well as provided with physical assistance to complete task.     Pt's/ family goals   1. Return home    Patient and or family understand(s) diagnosis, prognosis, and plan of care. yes    PLAN OF CARE:    Current Treatment Recommendations     [x] Strengthening     [] ROM   [x] Balance Training   [x] Endurance Training   [x] Transfer Training   [x] Gait Training   [x] Stair Training   [] Positioning   [x] Safety and Education Training   [x] Patient/Caregiver Education   [] HEP  [] Other     Frequency of treatments: 2-5x/week x 1-2 weeks. Time in  0905  Time out  0935    Total Treatment Time 25 minutes     Evaluation Time includes thorough review of current medical information, gathering information on past medical history/social history and prior level of function, completion of standardized testing/informal observation of tasks, assessment of data and education on plan of care and goals.     CPT codes:  [] Low Complexity PT evaluation 73746  [x] Moderate Complexity PT evaluation 99160  [] High Complexity PT evaluation 56976  [] PT Re-evaluation 15132  [] Gait training 85927 - minutes  [] Manual therapy 19349 - minutes  [x] Therapeutic activities 32973 25 minutes  [] Therapeutic exercises 62189 - minutes  [] Neuromuscular reeducation 42295 - minutes     [address] [facility], [place], [place]  [id number]

## 2021-05-11 NOTE — PLAN OF CARE
Problem: Cardiac Output - Decreased:  Goal: Hemodynamic stability will improve  Description: Hemodynamic stability will improve  5/10/2021 2104 by Jeremy Hilton RN  Outcome: Ongoing  5/10/2021 1819 by Konrad Leon RN  Outcome: Met This Shift     Problem: Falls - Risk of:  Goal: Will remain free from falls  Description: Will remain free from falls  5/10/2021 2104 by Jeremy Hilton RN  Outcome: Ongoing  5/10/2021 1819 by Konrad Leon RN  Outcome: Met This Shift  Goal: Absence of physical injury  Description: Absence of physical injury  5/10/2021 2104 by Jeremy Hilton RN  Outcome: Ongoing  5/10/2021 1819 by Konrad Leon RN  Outcome: Met This Shift     Problem: Skin Integrity:  Goal: Will show no infection signs and symptoms  Description: Will show no infection signs and symptoms  5/10/2021 2104 by Jeremy Hilton RN  Outcome: Ongoing  5/10/2021 1819 by Konrad Leon RN  Outcome: Met This Shift  Goal: Absence of new skin breakdown  Description: Absence of new skin breakdown  5/10/2021 2104 by Jeremy Hilton RN  Outcome: Ongoing  5/10/2021 1819 by Konrad Leon RN  Outcome: Met This Shift     Problem: Discharge Planning:  Goal: Discharged to appropriate level of care  Description: Discharged to appropriate level of care  Outcome: Ongoing     Problem:  Activity Intolerance:  Goal: Able to perform prescribed physical activity  Description: Able to perform prescribed physical activity  Outcome: Ongoing  Goal: Ability to tolerate increased activity will improve  Description: Ability to tolerate increased activity will improve  Outcome: Ongoing     Problem: Anxiety:  Goal: Level of anxiety will decrease  Description: Level of anxiety will decrease  Outcome: Ongoing     Problem: Cardiac Output - Decreased:  Goal: Cardiac output within specified parameters  Description: Cardiac output within specified parameters  5/10/2021 2104 by Jeremy Hilton RN  Outcome: Ongoing  5/10/2021 1819 by

## 2021-05-12 ENCOUNTER — APPOINTMENT (OUTPATIENT)
Dept: GENERAL RADIOLOGY | Age: 51
DRG: 233 | End: 2021-05-12

## 2021-05-12 LAB
ANION GAP SERPL CALCULATED.3IONS-SCNC: 12 MMOL/L (ref 7–16)
BUN BLDV-MCNC: 17 MG/DL (ref 6–20)
CALCIUM SERPL-MCNC: 9.9 MG/DL (ref 8.6–10.2)
CHLORIDE BLD-SCNC: 98 MMOL/L (ref 98–107)
CO2: 26 MMOL/L (ref 22–29)
CREAT SERPL-MCNC: 1 MG/DL (ref 0.7–1.2)
GFR AFRICAN AMERICAN: >60
GFR NON-AFRICAN AMERICAN: >60 ML/MIN/1.73
GLUCOSE BLD-MCNC: 136 MG/DL (ref 74–99)
HCT VFR BLD CALC: 39.8 % (ref 37–54)
HEMOGLOBIN: 13.4 G/DL (ref 12.5–16.5)
MAGNESIUM: 2.4 MG/DL (ref 1.6–2.6)
MCH RBC QN AUTO: 32.9 PG (ref 26–35)
MCHC RBC AUTO-ENTMCNC: 33.7 % (ref 32–34.5)
MCV RBC AUTO: 97.8 FL (ref 80–99.9)
METER GLUCOSE: 111 MG/DL (ref 74–99)
METER GLUCOSE: 122 MG/DL (ref 74–99)
METER GLUCOSE: 136 MG/DL (ref 74–99)
METER GLUCOSE: 139 MG/DL (ref 74–99)
PDW BLD-RTO: 12.5 FL (ref 11.5–15)
PLATELET # BLD: 297 E9/L (ref 130–450)
PMV BLD AUTO: 9 FL (ref 7–12)
POTASSIUM SERPL-SCNC: 4 MMOL/L (ref 3.5–5)
RBC # BLD: 4.07 E12/L (ref 3.8–5.8)
SODIUM BLD-SCNC: 136 MMOL/L (ref 132–146)
WBC # BLD: 11.5 E9/L (ref 4.5–11.5)

## 2021-05-12 PROCEDURE — 85027 COMPLETE CBC AUTOMATED: CPT

## 2021-05-12 PROCEDURE — 6370000000 HC RX 637 (ALT 250 FOR IP): Performed by: NURSE PRACTITIONER

## 2021-05-12 PROCEDURE — 94760 N-INVAS EAR/PLS OXIMETRY 1: CPT

## 2021-05-12 PROCEDURE — 94640 AIRWAY INHALATION TREATMENT: CPT

## 2021-05-12 PROCEDURE — 99232 SBSQ HOSP IP/OBS MODERATE 35: CPT | Performed by: INTERNAL MEDICINE

## 2021-05-12 PROCEDURE — 82962 GLUCOSE BLOOD TEST: CPT

## 2021-05-12 PROCEDURE — 2700000000 HC OXYGEN THERAPY PER DAY

## 2021-05-12 PROCEDURE — 93798 PHYS/QHP OP CAR RHAB W/ECG: CPT

## 2021-05-12 PROCEDURE — 6360000002 HC RX W HCPCS: Performed by: THORACIC SURGERY (CARDIOTHORACIC VASCULAR SURGERY)

## 2021-05-12 PROCEDURE — 6360000002 HC RX W HCPCS: Performed by: NURSE PRACTITIONER

## 2021-05-12 PROCEDURE — 71045 X-RAY EXAM CHEST 1 VIEW: CPT

## 2021-05-12 PROCEDURE — 2580000003 HC RX 258: Performed by: NURSE PRACTITIONER

## 2021-05-12 PROCEDURE — 83735 ASSAY OF MAGNESIUM: CPT

## 2021-05-12 PROCEDURE — 2500000003 HC RX 250 WO HCPCS: Performed by: NURSE PRACTITIONER

## 2021-05-12 PROCEDURE — 36415 COLL VENOUS BLD VENIPUNCTURE: CPT

## 2021-05-12 PROCEDURE — 2140000000 HC CCU INTERMEDIATE R&B

## 2021-05-12 PROCEDURE — 80048 BASIC METABOLIC PNL TOTAL CA: CPT

## 2021-05-12 RX ORDER — FUROSEMIDE 10 MG/ML
20 INJECTION INTRAMUSCULAR; INTRAVENOUS ONCE
Status: COMPLETED | OUTPATIENT
Start: 2021-05-12 | End: 2021-05-12

## 2021-05-12 RX ORDER — BISACODYL 10 MG
10 SUPPOSITORY, RECTAL RECTAL DAILY
Status: DISCONTINUED | OUTPATIENT
Start: 2021-05-13 | End: 2021-05-14 | Stop reason: HOSPADM

## 2021-05-12 RX ADMIN — OXYCODONE HYDROCHLORIDE AND ACETAMINOPHEN 500 MG: 500 TABLET ORAL at 08:29

## 2021-05-12 RX ADMIN — METOPROLOL TARTRATE 12.5 MG: 25 TABLET, FILM COATED ORAL at 10:22

## 2021-05-12 RX ADMIN — ENOXAPARIN SODIUM 40 MG: 40 INJECTION SUBCUTANEOUS at 10:22

## 2021-05-12 RX ADMIN — METOPROLOL TARTRATE 12.5 MG: 25 TABLET, FILM COATED ORAL at 21:27

## 2021-05-12 RX ADMIN — HYDROCODONE BITARTRATE AND ACETAMINOPHEN 2 TABLET: 5; 325 TABLET ORAL at 10:27

## 2021-05-12 RX ADMIN — OXYCODONE HYDROCHLORIDE AND ACETAMINOPHEN 500 MG: 500 TABLET ORAL at 21:26

## 2021-05-12 RX ADMIN — MORPHINE SULFATE 2 MG: 2 INJECTION, SOLUTION INTRAMUSCULAR; INTRAVENOUS at 01:12

## 2021-05-12 RX ADMIN — ATORVASTATIN CALCIUM 80 MG: 80 TABLET, FILM COATED ORAL at 21:26

## 2021-05-12 RX ADMIN — FERROUS SULFATE TAB 325 MG (65 MG ELEMENTAL FE) 325 MG: 325 (65 FE) TAB at 16:08

## 2021-05-12 RX ADMIN — CLOPIDOGREL 75 MG: 75 TABLET, FILM COATED ORAL at 08:30

## 2021-05-12 RX ADMIN — MAGNESIUM GLUCONATE 500 MG ORAL TABLET 400 MG: 500 TABLET ORAL at 08:30

## 2021-05-12 RX ADMIN — SENNOSIDES AND DOCUSATE SODIUM 1 TABLET: 8.6; 5 TABLET ORAL at 08:29

## 2021-05-12 RX ADMIN — LISINOPRIL 10 MG: 10 TABLET ORAL at 08:29

## 2021-05-12 RX ADMIN — ASPIRIN 81 MG: 81 TABLET, COATED ORAL at 08:29

## 2021-05-12 RX ADMIN — TAMSULOSIN HYDROCHLORIDE 0.4 MG: 0.4 CAPSULE ORAL at 08:29

## 2021-05-12 RX ADMIN — IPRATROPIUM BROMIDE AND ALBUTEROL SULFATE 1 AMPULE: .5; 3 SOLUTION RESPIRATORY (INHALATION) at 08:52

## 2021-05-12 RX ADMIN — IPRATROPIUM BROMIDE AND ALBUTEROL SULFATE 1 AMPULE: .5; 3 SOLUTION RESPIRATORY (INHALATION) at 17:30

## 2021-05-12 RX ADMIN — Medication 2000 MG: at 06:14

## 2021-05-12 RX ADMIN — AMLODIPINE BESYLATE 10 MG: 10 TABLET ORAL at 08:29

## 2021-05-12 RX ADMIN — MAGNESIUM HYDROXIDE 30 ML: 2400 SUSPENSION ORAL at 08:30

## 2021-05-12 RX ADMIN — FERROUS SULFATE TAB 325 MG (65 MG ELEMENTAL FE) 325 MG: 325 (65 FE) TAB at 08:30

## 2021-05-12 RX ADMIN — Medication 10 ML: at 21:27

## 2021-05-12 RX ADMIN — PANTOPRAZOLE SODIUM 40 MG: 40 TABLET, DELAYED RELEASE ORAL at 08:30

## 2021-05-12 RX ADMIN — SENNOSIDES AND DOCUSATE SODIUM 1 TABLET: 8.6; 5 TABLET ORAL at 21:27

## 2021-05-12 RX ADMIN — FUROSEMIDE 20 MG: 10 INJECTION, SOLUTION INTRAMUSCULAR; INTRAVENOUS at 11:07

## 2021-05-12 RX ADMIN — Medication 10 ML: at 08:29

## 2021-05-12 RX ADMIN — POTASSIUM CHLORIDE 20 MEQ: 1500 TABLET, EXTENDED RELEASE ORAL at 10:22

## 2021-05-12 RX ADMIN — MUPIROCIN: 20 OINTMENT TOPICAL at 21:26

## 2021-05-12 RX ADMIN — HYDROCODONE BITARTRATE AND ACETAMINOPHEN 2 TABLET: 5; 325 TABLET ORAL at 04:06

## 2021-05-12 RX ADMIN — MORPHINE SULFATE 2 MG: 2 INJECTION, SOLUTION INTRAMUSCULAR; INTRAVENOUS at 06:47

## 2021-05-12 RX ADMIN — HYDROCODONE BITARTRATE AND ACETAMINOPHEN 2 TABLET: 5; 325 TABLET ORAL at 18:41

## 2021-05-12 RX ADMIN — FOLIC ACID 1 MG: 1 TABLET ORAL at 08:30

## 2021-05-12 RX ADMIN — IPRATROPIUM BROMIDE AND ALBUTEROL SULFATE 1 AMPULE: .5; 3 SOLUTION RESPIRATORY (INHALATION) at 12:21

## 2021-05-12 RX ADMIN — MUPIROCIN: 20 OINTMENT TOPICAL at 08:30

## 2021-05-12 RX ADMIN — BISACODYL 5 MG: 5 TABLET, COATED ORAL at 08:29

## 2021-05-12 ASSESSMENT — PAIN SCALES - GENERAL
PAINLEVEL_OUTOF10: 9
PAINLEVEL_OUTOF10: 7
PAINLEVEL_OUTOF10: 0

## 2021-05-12 NOTE — CARE COORDINATION
SOCIAL WORK/CASEMANAGEMENT TRANSITION OF CARE [id number], [address], [place], [phone]): I met with pt and wife in the room this a.m. the pt ambulated 200' and 250' with cardiac rehab this a.m. plan remains home with [facility]. Pt with chest tubes and wires. Sw/cm to follow. [facility]  5/12/2021    The Plan for Transition of Care is related to the following treatment goals: The Patient and/or patient representative  was provided with a choice of provider and agrees   with the discharge plan. [x] Yes [] No  Freedom of choice list was provided with basic dialogue that supports the patient's individualized plan of care/goals, treatment preferences and shares the quality data associated with the providers.  [x] Yes [] No

## 2021-05-12 NOTE — PROGRESS NOTES
Nutrition Education    Counseled patient and wife on heart healthy/cardiac diet. Encouraged adequate intake of meals and supplements. Pt states fair appetite since surgery. · Written educational materials provided. · Contact name and number provided. · Refer to Patient Education activity for more details.     Electronically signed by Grupo Napier RD, JUSTINO on 5/12/21 at 12:01 PM EDT    Contact: [phone]

## 2021-05-12 NOTE — PROGRESS NOTES
Perfect serve message sent to Dr. Yadira Hoffman of cardiology re patient having 25 beats of Vtac at 2110. Patient vital taken, all stable and patient not symptomatic. Updated physician on patient's most recent potassium and magnesium lab levels. Instructed to watch for now. Will continue to monitor.

## 2021-05-12 NOTE — PROGRESS NOTES
POD#2 Awake, alert. No complaints other than incisional/chest tube discomfort. Denies CP, palpitations, SOB at rest, dizziness/lightheadedness. Vitals:    05/12/21 0715 05/12/21 0837 05/12/21 0852 05/12/21 0930   BP:       Pulse:       Resp:   16    Temp:       TempSrc:       SpO2: 95% 94% 95% 92%   Weight:       Height:         O2: 5L/NC with SpO2 92%      Intake/Output Summary (Last 24 hours) at 5/12/2021 0933  Last data filed at 5/12/2021 0400  Gross per 24 hour   Intake 720 ml   Output 1650 ml   Net -930 ml         +BM pre-op    UO: 350mL/8hr   CT output: Mediastinal: 75mL/24hrs   Pleural: 75mL/24hrs      Recent Labs     05/10/21  0712 05/10/21  1715 05/11/21  0401   WBC 7.0 12.6* 15.2*   HGB 15.7 11.2* 12.9   HCT 47.4 34.2* 38.7    199 310      Recent Labs     05/10/21  0712 05/10/21  1715 05/11/21  0401   BUN 15 16 14   CREATININE 1.1 1.4* 1.0         Telemetry: SR (HR 80's)      PE  Cardiac: RRR  Lungs: decreased bases  Chest incision with intact NOLBERTO DSD. Chest tubes x 4 and Epicardial pacing wires present and secure. Abd: Soft, nontender, +BS  Ext: Incisions C/D/I, approximated, no erythema, + minimal edema           A/P: POD#2    1. CAD  --Stable s/p CABG x 3 (LIMA-LAD, SVG-OM, SVG-PDA), LAD endarterectomy, KLS plating on 5/10/2021  --Post op PAULETTE reveals normal biventricular function  --Scr stable  --DAPT/statin/norvasc/lipitor--will add low dose BB  --reinforce sternal precautions  --continue epicardial pacing wires  --chest tubes without significant output and without airleaks. Chest tubes removed without difficulty. Patient tolerated well       2. Acute blood loss anemia secondary to open heart surgery  --stable      3. NSR  --SB HR 50's pre-op and DOS--will add low dose BB with hold parameters        4.  Prolonged postoperative respiratory insufficiency  --wean oxygen to keep SpO2 greater than or equal to 92%  --continue duonebs with ezpap  --encourage C&DB, SMI  --currently on 5L

## 2021-05-12 NOTE — PROGRESS NOTES
INPATIENT CARDIOLOGY FOLLOW-UP    Name: Betty Whiteside    Age: 45 y.o. Date of Admission: 5/2/2021  2:25 AM    Date of Service: 5/12/2021    Chief Complaint: Follow-up for coronary atherosclerosis, ischemic cardiomyopathy, chronic obstructive lung disease, moderate obesity    Interim History: The patient presently appears compensated from a cardiovascular standpoint with mild residual postoperative discomfort. Appropriate fluid mobilization has occurred with a repeat chest x-ray in follow-up of his interstitial infiltrates and pleural effusions pending. He remains hemodynamically stable with sinus rhythm maintained. Review of Systems: The remainder of a complete multisystem review including consitutional, central nervous, respiratory, circulatory, gastrointestinal, genitourinary, endocrinologic, hematologic, musculoskeletal and psychiatric are negative.     Problem List:  Patient Active Problem List   Diagnosis    Essential hypertension    NSTEMI (non-ST elevated myocardial infarction) ([facility] [address]    Chest pain       Allergies:  No Known Allergies    Current Medications:  Current Facility-Administered Medications   Medication Dose Route Frequency Provider Last Rate Last Admin    amLODIPine (NORVASC) tablet 10 mg  10 mg Oral Daily Marylou Vannessa, APRN - CNP   10 mg at 05/12/21 0829    aspirin EC tablet 81 mg  81 mg Oral Daily Marylou Vannessa, APRN - CNP   81 mg at 05/12/21 0829    acetaminophen (TYLENOL) tablet 650 mg  650 mg Oral Q4H PRN Marylou Vannessa, APRN - CNP        HYDROcodone-acetaminophen (NORCO) 5-325 MG per tablet 1 tablet  1 tablet Oral Q4H PRN Marylou Vannessa, APRN - CNP        Or    HYDROcodone-acetaminophen (NORCO) 5-325 MG per tablet 2 tablet  2 tablet Oral Q4H PRN Marylou Vannessa, APRN - CNP   2 tablet at 05/12/21 0406    bisacodyl (DULCOLAX) EC tablet 5 mg  5 mg Oral Daily Marylou Vannessa, APRN - CNP   5 mg at 05/12/21 0829    sennosides-docusate sodium (SENOKOT-S) 8.6-50 MG tablet 1 tablet  1 tablet Oral BID Tawny Fields, APRN - CNP   1 tablet at 05/12/21 1946    magnesium hydroxide (MILK OF MAGNESIA) 400 MG/5ML suspension 30 mL  30 mL Oral Daily Tawny Fields, APRN - CNP   30 mL at 05/12/21 0830    ferrous sulfate (IRON 325) tablet 325 mg  325 mg Oral BID  Tawny Fields APRN - CNP   325 mg at 05/12/21 0830    ascorbic acid (VITAMIN C) tablet 500 mg  500 mg Oral BID Tawny Marine, APRN - CNP   500 mg at 15/59/11 7156    folic acid (FOLVITE) tablet 1 mg  1 mg Oral Daily [facility], APRN - CNP   1 mg at 05/12/21 0830    potassium chloride (KLOR-CON M) extended release tablet 20 mEq  20 mEq Oral PRN Tawny Marine APRN - CNP        bisacodyl (DULCOLAX) suppository 10 mg  10 mg Rectal Daily PRN Tawny Marine APRN - CNP        insulin lispro (HUMALOG) injection vial 0-6 Units  0-6 Units Subcutaneous TID [facility] APRN - CNP        insulin lispro (HUMALOG) injection vial 0-3 Units  0-3 Units Subcutaneous Nightly Tawny Marine, APRN - CNP        pantoprazole (PROTONIX) tablet 40 mg  40 mg Oral Daily Tawny Marine APRN - CNP   40 mg at 05/12/21 0830    lisinopril (PRINIVIL;ZESTRIL) tablet 10 mg  10 mg Oral Daily Tawny Marine, APRN - CNP   10 mg at 05/12/21 0121    hydrALAZINE (APRESOLINE) injection 10 mg  10 mg Intravenous Q4H PRN Tawny Marine APRN - CNP   10 mg at 05/11/21 1625    atorvastatin (LIPITOR) tablet 80 mg  80 mg Oral Nightly Tawny Marine, APRN - CNP   80 mg at 05/11/21 2327    morphine (PF) injection 2 mg  2 mg Intravenous Q4H PRN Cintia May DO   2 mg at 05/12/21 0647    sodium chloride flush 0.9 % injection 10 mL  10 mL Intravenous 2 times per day Tawny Diamond APRN - CNP   10 mL at 05/12/21 0829    sodium chloride flush 0.9 % injection 10 mL  10 mL Intravenous PRN Tawny Marine APRN - CNP        ondansetron [facility]) injection 4 mg  4 mg Intravenous Q8H PRN Tawny Fields APRN - CNP        magnesium oxide (MAG-OX) tablet 400 mg  400 mg Oral Daily Madlyn Bahai, APRN - CNP   400 mg at 05/12/21 0830    mupirocin (BACTROBAN) 2 % ointment   Nasal BID Madlyn Bahai, APRN - CNP   Given at 05/12/21 0830    ipratropium-albuterol (DUONEB) nebulizer solution 1 ampule  1 ampule Inhalation Q4H WA Madlyn Bahai, APRN - CNP   1 ampule at 05/11/21 2132    glucose (GLUTOSE) 40 % oral gel 15 g  15 g Oral PRN Madlyn Bahai, APRN - CNP        dextrose 50 % IV solution  12.5 g Intravenous PRN Madlyn Bahai, APRN - CNP        glucagon (rDNA) injection 1 mg  1 mg Intramuscular PRN Madlyn Bahai, APRN - CNP        dextrose 5 % solution  100 mL/hr Intravenous PRN Madlyn Bahai, APRN - CNP        clopidogrel (PLAVIX) tablet 75 mg  75 mg Oral Daily Madlyn Bahai, APRN - CNP   75 mg at 05/12/21 0830    tamsulosin (FLOMAX) capsule 0.4 mg  0.4 mg Oral Daily Madlyn Bahai, APRN - CNP   0.4 mg at 05/12/21 0829      dextrose         Physical Exam:  /70   Pulse 80   Temp 98.2 °F (36.8 °C) (Temporal)   Resp 14   Ht 5' 11\" (1.803 m)   Wt 222 lb 6.4 oz (100.9 kg)   SpO2 94%   BMI 31.02 kg/m²   Weight change: -8 lb 3.2 oz (-3.719 kg)  Wt Readings from Last 3 Encounters:   05/12/21 222 lb 6.4 oz (100.9 kg)   10/14/19 242 lb (109.8 kg)   03/01/19 238 lb (108 kg)     The patient is awake, alert and in no discomfort or distress. No gross musculoskeletal deformity is present. No significant skin or nail changes are present. Gross examination of head, eyes, nose and throat are negative. Jugular venous pressure is normal and no carotid bruits are present. Normal respiratory effort is noted with no accessory muscle usage present. Lung fields are clear to ascultation diminished breath sounds in both lung bases and somewhat limited inspiratory effort. Cardiac examination is notable for a regular rate and rhythm with no palpable thrill. No gallop rhythm or cardiac murmur are identified.  A benign abdominal examination is present with no masses or organomegaly. Intact pulses are present throughout all extremities and trivial pretibial edema predominately of his left lower extremity edema is present. No focal neurologic deficits are present. Intake/Output:    Intake/Output Summary (Last 24 hours) at 5/12/2021 0840  Last data filed at 5/12/2021 0400  Gross per 24 hour   Intake 720 ml   Output 1750 ml   Net -1030 ml     No intake/output data recorded. Laboratory Tests:  Lab Results   Component Value Date    CREATININE 1.0 05/11/2021    BUN 14 05/11/2021     05/11/2021    K 4.2 05/11/2021     05/11/2021    CO2 24 05/11/2021     No results for input(s): CKTOTAL, CKMB in the last 72 hours. Invalid input(s): TROPONONI  No results found for: BNP  Lab Results   Component Value Date    WBC 15.2 05/11/2021    RBC 3.96 05/11/2021    HGB 12.9 05/11/2021    HCT 38.7 05/11/2021    MCV 97.7 05/11/2021    MCH 32.6 05/11/2021    MCHC 33.3 05/11/2021    RDW 12.5 05/11/2021     05/11/2021    MPV 9.0 05/11/2021     No results for input(s): ALKPHOS, ALT, AST, PROT, BILITOT, BILIDIR, LABALBU in the last 72 hours.   Lab Results   Component Value Date    MG 2.2 05/11/2021     Lab Results   Component Value Date    PROTIME 15.0 05/10/2021    INR 1.4 05/10/2021     Lab Results   Component Value Date    TSH 0.489 05/03/2021     No components found for: CHLPL  Lab Results   Component Value Date    TRIG 80 05/02/2021     Lab Results   Component Value Date    HDL 37 05/02/2021     Lab Results   Component Value Date    LDLCALC 177 (H) 05/02/2021       Cardiac Tests:  Telemetry findings reviewed: sinus rhythm with occasional supraventricular ectopy, no new tachy/bradyarrhythmias overnight  Chest X-ray: pending, most recent chest x-ray demonstrated evidence of cardiomegaly with interstitial infiltrates and small bilateral pleural effusions      ASSESSMENT / PLAN: On a clinical basis, the patient presently appears compensated from a cardiovascular standpoint with no significant perioperative cardiovascular complications. He remains hemodynamically stable with appropriate fluid mobilization of the past 24 hours and a repeat pending chest x-ray for further evaluation of his postoperative status. His needs of continued aggressive pulmonary physiotherapy have been discussed with he and family. Ongoing monitoring of blood pressure will be essential to optimizing the diastolic component of heart failure management. On a long-term basis, aggressive risk factor modification of blood pressure and serum lipids will remain essential to reducing risk of future atherosclerotic development as well as that of appropriate lifestyle modification both related to smoking cessation to reduce risk of further adverse effects both on his chronic obstructive lung disease and atherosclerotic risk as well as that of weight reduction. We will further evaluate him during his postoperative recovery should additional cardiovascular difficulties or concerns arise needs a follow-up with his primary cardiologist, Saravanan Mcneil following hospital discharge. Note: This report was completed utilizing computer voice recognition software. Every effort has been made to ensure accuracy, however; inadvertent computerized transcription errors may be present. Martah Dominguez.  [facility] [place], [address]

## 2021-05-12 NOTE — PROGRESS NOTES
Perfect serve message sent to Dr. Nicole Garica regarding patient stating his pain is 9 on 0-10 scale not relieved by prn Norco. Orders received for Morphine 2mg IV q 4 hours prn.

## 2021-05-13 LAB
ANION GAP SERPL CALCULATED.3IONS-SCNC: 12 MMOL/L (ref 7–16)
BLOOD BANK DISPENSE STATUS: NORMAL
BLOOD BANK PRODUCT CODE: NORMAL
BPU ID: NORMAL
BUN BLDV-MCNC: 18 MG/DL (ref 6–20)
CALCIUM SERPL-MCNC: 9.8 MG/DL (ref 8.6–10.2)
CHLORIDE BLD-SCNC: 99 MMOL/L (ref 98–107)
CO2: 25 MMOL/L (ref 22–29)
CREAT SERPL-MCNC: 0.8 MG/DL (ref 0.7–1.2)
DESCRIPTION BLOOD BANK: NORMAL
GFR AFRICAN AMERICAN: >60
GFR NON-AFRICAN AMERICAN: >60 ML/MIN/1.73
GLUCOSE BLD-MCNC: 119 MG/DL (ref 74–99)
HCT VFR BLD CALC: 35 % (ref 37–54)
HEMOGLOBIN: 11.7 G/DL (ref 12.5–16.5)
MAGNESIUM: 2.4 MG/DL (ref 1.6–2.6)
MCH RBC QN AUTO: 32.6 PG (ref 26–35)
MCHC RBC AUTO-ENTMCNC: 33.4 % (ref 32–34.5)
MCV RBC AUTO: 97.5 FL (ref 80–99.9)
PDW BLD-RTO: 12.6 FL (ref 11.5–15)
PLATELET # BLD: 304 E9/L (ref 130–450)
PMV BLD AUTO: 9.2 FL (ref 7–12)
POTASSIUM SERPL-SCNC: 4 MMOL/L (ref 3.5–5)
RBC # BLD: 3.59 E12/L (ref 3.8–5.8)
SODIUM BLD-SCNC: 136 MMOL/L (ref 132–146)
WBC # BLD: 11.2 E9/L (ref 4.5–11.5)

## 2021-05-13 PROCEDURE — 97530 THERAPEUTIC ACTIVITIES: CPT

## 2021-05-13 PROCEDURE — 6370000000 HC RX 637 (ALT 250 FOR IP): Performed by: THORACIC SURGERY (CARDIOTHORACIC VASCULAR SURGERY)

## 2021-05-13 PROCEDURE — 83735 ASSAY OF MAGNESIUM: CPT

## 2021-05-13 PROCEDURE — 6360000002 HC RX W HCPCS: Performed by: NURSE PRACTITIONER

## 2021-05-13 PROCEDURE — 94640 AIRWAY INHALATION TREATMENT: CPT

## 2021-05-13 PROCEDURE — 2700000000 HC OXYGEN THERAPY PER DAY

## 2021-05-13 PROCEDURE — 97535 SELF CARE MNGMENT TRAINING: CPT

## 2021-05-13 PROCEDURE — 36415 COLL VENOUS BLD VENIPUNCTURE: CPT

## 2021-05-13 PROCEDURE — 6370000000 HC RX 637 (ALT 250 FOR IP): Performed by: NURSE PRACTITIONER

## 2021-05-13 PROCEDURE — 85027 COMPLETE CBC AUTOMATED: CPT

## 2021-05-13 PROCEDURE — 93798 PHYS/QHP OP CAR RHAB W/ECG: CPT

## 2021-05-13 PROCEDURE — 6370000000 HC RX 637 (ALT 250 FOR IP): Performed by: PHYSICIAN ASSISTANT

## 2021-05-13 PROCEDURE — 2580000003 HC RX 258: Performed by: NURSE PRACTITIONER

## 2021-05-13 PROCEDURE — 80048 BASIC METABOLIC PNL TOTAL CA: CPT

## 2021-05-13 PROCEDURE — 2140000000 HC CCU INTERMEDIATE R&B

## 2021-05-13 RX ORDER — AMIODARONE HYDROCHLORIDE 200 MG/1
400 TABLET ORAL 2 TIMES DAILY
Status: DISCONTINUED | OUTPATIENT
Start: 2021-05-13 | End: 2021-05-14 | Stop reason: HOSPADM

## 2021-05-13 RX ORDER — MECOBALAMIN 5000 MCG
5 TABLET,DISINTEGRATING ORAL NIGHTLY
Status: DISCONTINUED | OUTPATIENT
Start: 2021-05-13 | End: 2021-05-14 | Stop reason: HOSPADM

## 2021-05-13 RX ORDER — FUROSEMIDE 10 MG/ML
20 INJECTION INTRAMUSCULAR; INTRAVENOUS 2 TIMES DAILY
Status: DISCONTINUED | OUTPATIENT
Start: 2021-05-13 | End: 2021-05-14 | Stop reason: HOSPADM

## 2021-05-13 RX ADMIN — LISINOPRIL 10 MG: 10 TABLET ORAL at 08:12

## 2021-05-13 RX ADMIN — Medication 10 ML: at 22:55

## 2021-05-13 RX ADMIN — IPRATROPIUM BROMIDE AND ALBUTEROL SULFATE 1 AMPULE: .5; 3 SOLUTION RESPIRATORY (INHALATION) at 16:33

## 2021-05-13 RX ADMIN — AMIODARONE HYDROCHLORIDE 400 MG: 200 TABLET ORAL at 11:19

## 2021-05-13 RX ADMIN — MAGNESIUM GLUCONATE 500 MG ORAL TABLET 400 MG: 500 TABLET ORAL at 08:12

## 2021-05-13 RX ADMIN — SENNOSIDES AND DOCUSATE SODIUM 1 TABLET: 8.6; 5 TABLET ORAL at 08:13

## 2021-05-13 RX ADMIN — POTASSIUM CHLORIDE 20 MEQ: 1500 TABLET, EXTENDED RELEASE ORAL at 08:18

## 2021-05-13 RX ADMIN — HYDROCODONE BITARTRATE AND ACETAMINOPHEN 1 TABLET: 5; 325 TABLET ORAL at 01:17

## 2021-05-13 RX ADMIN — FOLIC ACID 1 MG: 1 TABLET ORAL at 08:13

## 2021-05-13 RX ADMIN — SALINE NASAL SPRAY 1 SPRAY: 1.5 SOLUTION NASAL at 21:47

## 2021-05-13 RX ADMIN — OXYCODONE HYDROCHLORIDE AND ACETAMINOPHEN 500 MG: 500 TABLET ORAL at 21:38

## 2021-05-13 RX ADMIN — FUROSEMIDE 20 MG: 10 INJECTION, SOLUTION INTRAMUSCULAR; INTRAVENOUS at 08:13

## 2021-05-13 RX ADMIN — ENOXAPARIN SODIUM 40 MG: 40 INJECTION SUBCUTANEOUS at 08:12

## 2021-05-13 RX ADMIN — IPRATROPIUM BROMIDE AND ALBUTEROL SULFATE 1 AMPULE: .5; 3 SOLUTION RESPIRATORY (INHALATION) at 12:21

## 2021-05-13 RX ADMIN — METOPROLOL TARTRATE 12.5 MG: 25 TABLET, FILM COATED ORAL at 08:13

## 2021-05-13 RX ADMIN — ASPIRIN 81 MG: 81 TABLET, COATED ORAL at 08:12

## 2021-05-13 RX ADMIN — HYDROCODONE BITARTRATE AND ACETAMINOPHEN 2 TABLET: 5; 325 TABLET ORAL at 21:37

## 2021-05-13 RX ADMIN — FERROUS SULFATE TAB 325 MG (65 MG ELEMENTAL FE) 325 MG: 325 (65 FE) TAB at 15:55

## 2021-05-13 RX ADMIN — TAMSULOSIN HYDROCHLORIDE 0.4 MG: 0.4 CAPSULE ORAL at 08:14

## 2021-05-13 RX ADMIN — MAGNESIUM HYDROXIDE 30 ML: 2400 SUSPENSION ORAL at 08:13

## 2021-05-13 RX ADMIN — PANTOPRAZOLE SODIUM 40 MG: 40 TABLET, DELAYED RELEASE ORAL at 08:18

## 2021-05-13 RX ADMIN — MUPIROCIN: 20 OINTMENT TOPICAL at 08:12

## 2021-05-13 RX ADMIN — Medication 5 MG: at 23:50

## 2021-05-13 RX ADMIN — METOPROLOL TARTRATE 12.5 MG: 25 TABLET, FILM COATED ORAL at 21:38

## 2021-05-13 RX ADMIN — HYDROCODONE BITARTRATE AND ACETAMINOPHEN 2 TABLET: 5; 325 TABLET ORAL at 15:55

## 2021-05-13 RX ADMIN — AMIODARONE HYDROCHLORIDE 400 MG: 200 TABLET ORAL at 21:37

## 2021-05-13 RX ADMIN — OXYCODONE HYDROCHLORIDE AND ACETAMINOPHEN 500 MG: 500 TABLET ORAL at 08:13

## 2021-05-13 RX ADMIN — FERROUS SULFATE TAB 325 MG (65 MG ELEMENTAL FE) 325 MG: 325 (65 FE) TAB at 08:13

## 2021-05-13 RX ADMIN — Medication 10 ML: at 08:12

## 2021-05-13 RX ADMIN — AMLODIPINE BESYLATE 10 MG: 10 TABLET ORAL at 08:12

## 2021-05-13 RX ADMIN — MUPIROCIN: 20 OINTMENT TOPICAL at 21:37

## 2021-05-13 RX ADMIN — BISACODYL 5 MG: 5 TABLET, COATED ORAL at 08:13

## 2021-05-13 RX ADMIN — ATORVASTATIN CALCIUM 80 MG: 80 TABLET, FILM COATED ORAL at 21:36

## 2021-05-13 RX ADMIN — FUROSEMIDE 20 MG: 10 INJECTION, SOLUTION INTRAMUSCULAR; INTRAVENOUS at 17:54

## 2021-05-13 RX ADMIN — CLOPIDOGREL 75 MG: 75 TABLET, FILM COATED ORAL at 08:12

## 2021-05-13 RX ADMIN — HYDROCODONE BITARTRATE AND ACETAMINOPHEN 2 TABLET: 5; 325 TABLET ORAL at 06:19

## 2021-05-13 RX ADMIN — SENNOSIDES AND DOCUSATE SODIUM 1 TABLET: 8.6; 5 TABLET ORAL at 21:37

## 2021-05-13 ASSESSMENT — PAIN SCALES - GENERAL
PAINLEVEL_OUTOF10: 7
PAINLEVEL_OUTOF10: 5
PAINLEVEL_OUTOF10: 0

## 2021-05-13 ASSESSMENT — PAIN DESCRIPTION - LOCATION: LOCATION: INCISION;SHOULDER

## 2021-05-13 ASSESSMENT — PAIN DESCRIPTION - PAIN TYPE
TYPE: ACUTE PAIN;SURGICAL PAIN

## 2021-05-13 NOTE — PATIENT CARE CONFERENCE
[facility] Quality Flow/Interdisciplinary Rounds Progress Note        Quality Flow Rounds held on May 13, 2021    Disciplines Attending:  Bedside Nurse, ,  and Nursing Unit Leadership    Danny Thomson was admitted on 5/2/2021  2:25 AM    Anticipated Discharge Date:  Expected Discharge Date: 05/15/21    Disposition:    John Score:  John Scale Score: 19    Readmission Risk              Risk of Unplanned Readmission:        10           Discussed patient goal for the day, patient clinical progression, and barriers to discharge.   The following Goal(s) of the Day/Commitment(s) have been identified:  Labs - Report Results      Richard Pedroza  May 13, 2021

## 2021-05-13 NOTE — PROGRESS NOTES
Patient educated on incentive spirometry, sternal precautions and keeping oxygen in due to pulse ox dropping to low 80's when oxygen in off.

## 2021-05-13 NOTE — CARE COORDINATION
SOCIAL WORK/CASEMANAGEMENT TRANSITION OF CARE [id number] [place], [address], [place] Marlon, [phone]): I met with pt and his mother, Gordon Adkins, [phone] c# [phone], [id number] [facility] , [address] [place] [address] where pt will be going on discharge. Per ctvs pt can go as early as tomorrow pending he is weaned from o3. Pt is on 4l nc right now. I notified [facility] of this and of address which they already has. Pt ambulated 400' x 2 with cardiac rehab today. Monica Olivo. 5/13/2021  Pt is private pay. Pt is not eligible for help with medications on discharge thru the clinic pharmacy. Pt wants a 3:1 commode and shower chair and will use [employer] once a order is placed. Pt will receive a 40% discount thru [facility] dme and in agreement to using them. Will get order from [facility] and documentation in the a.m. provided pt with the pt assistance pamphlet.  Per public benefits pt is not medicaid eligible.  Jennifer Orantes  5/12/2021

## 2021-05-13 NOTE — CONSULTS
support on 5/10/2021. Rapid COVID-19 test negative. Sodium 136 potassium 4.0 BUN 18 creatinine 0.8 WBC 11.2 hemoglobin 11.7 INR 1.4. Past Medical History:   Diagnosis Date    CAD (coronary artery disease)     nstemi 5/2    Hyperlipidemia     Hypertension        Past Surgical History:   Procedure Laterality Date    CARDIAC CATHETERIZATION  05/03/2021    Dr. Azam Dee N/A 5/10/2021    CABG CORONARY ARTERY BYPASS, PAULETTE performed by Edilberto Hogue DO at [facility] OR       Family History   Problem Relation Age of Onset    Diabetes Mother     Diabetes Father     High Blood Pressure Father     Stroke Father     Cancer Paternal Grandfather     No Known Problems Sister     High Blood Pressure Brother     No Known Problems Sister     No Known Problems Sister     No Known Problems Sister        Social History:   Social History     Socioeconomic History    Marital status:      Spouse name: Not on file    Number of children: Not on file    Years of education: Not on file    Highest education level: Not on file   Occupational History    Not on file   Social Needs    Financial resource strain: Not on file    Food insecurity     Worry: Not on file     Inability: Not on file    Transportation needs     Medical: Not on file     Non-medical: Not on file   Tobacco Use    Smoking status: Current Every Day Smoker     Packs/day: 0.03     Types: Cigars    Smokeless tobacco: Never Used    Tobacco comment: 5 skinny cigars a day.    Substance and Sexual Activity    Alcohol use: No    Drug use: No    Sexual activity: Yes     Partners: Female   Lifestyle    Physical activity     Days per week: Not on file     Minutes per session: Not on file    Stress: Not on file   Relationships    Social connections     Talks on phone: Not on file     Gets together: Not on file     Attends Worship service: Not on file     Active member of club or organization: Not on file     Attends meetings of clubs or organizations: Not on file     Relationship status: Not on file    Intimate partner violence     Fear of current or ex partner: Not on file     Emotionally abused: Not on file     Physically abused: Not on file     Forced sexual activity: Not on file   Other Topics Concern    Not on file   Social History Narrative    Not on file     Smoking history: The patient is a current every day smoker of cigars    ETOH:   reports no history of alcohol use. Exposures:  There are no known exposures    Vaccines:      Immunization History   Administered Date(s) Administered    Influenza, Quadv, IM, PF (6 mo and older Fluzone, Flulaval, Fluarix, and 3 yrs and older Afluria) 10/14/2019    Pneumococcal Polysaccharide ([id number]) 02/01/2019    Tdap (Boostrix, Adacel) 02/01/2019        Home Meds: Medications Prior to Admission: aspirin 81 MG chewable tablet, Take 81 mg by mouth daily  lisinopril (PRINIVIL;ZESTRIL) 40 MG tablet, Take 1 tablet by mouth daily  amLODIPine (NORVASC) 10 MG tablet, TAKE 1 TABLET BY MOUTH DAILY  ibuprofen (ADVIL;MOTRIN) 200 MG tablet, Take 200 mg by mouth every 6 hours as needed for Pain    CURRENT MEDS :  Scheduled Meds:   furosemide  20 mg Intravenous BID    amiodarone  400 mg Oral BID    metoprolol tartrate  12.5 mg Oral BID    enoxaparin  40 mg Subcutaneous Daily    bisacodyl  10 mg Rectal Daily    amLODIPine  10 mg Oral Daily    aspirin  81 mg Oral Daily    bisacodyl  5 mg Oral Daily    sennosides-docusate sodium  1 tablet Oral BID    magnesium hydroxide  30 mL Oral Daily    ferrous sulfate  325 mg Oral BID WC    vitamin C  500 mg Oral BID    folic acid  1 mg Oral Daily    pantoprazole  40 mg Oral Daily    lisinopril  10 mg Oral Daily    atorvastatin  80 mg Oral Nightly    sodium chloride flush  10 mL Intravenous 2 times per day    magnesium oxide  400 mg Oral Daily    mupirocin   Nasal BID    ipratropium-albuterol  1 ampule Inhalation Q4H WA    clopidogrel  75 mg oral lesions and no post-nasal drip   Neck: supple without adenopathy  Cardiovascular: regular rate and rhythm without murmur or gallop  Respiratory: Clear to auscultation bilaterally without wheezing or crackles. Air entry is symmetric incisions healing old chest tube sites with dry dressing intact, epicardial pacer wires intact  Abdomen: soft, non-tender, non-distended, normal bowel sounds  Extremities: warm,  no clubbing trace bilateral lower extremity edema  Skin: no rash or lesion  Neurologic: CN II-XII grossly intact, no focal deficits    Pulmonary Function Testing personally reviewed and interpreted  PFTs normal 5/6/2021  FEV1 3.4 L, 91% of predicted, FEV1/FVC ratio 79, 103% of predicted. , 77% of predicted, SVC 4.30 L 88% of predicted, DLCO 33.68, 70% of predicted    Imaging personally reviewed:  FINDINGS:   EKG leads overlie the chest.  Right IJ catheter is been removed.  Chest and   mediastinal tubes remain in place.  Cardiac silhouette remains enlarged   similar to previous.  Lung volumes are diminished but demonstrate improved   aeration likely due to improving fluid overload and improving areas of   atelectasis.  No identified pneumothorax.           Impression   Improving aeration.  Continued follow-up recommended.             CT chest non contrast    5/3/21    FINDINGS:   Exam is limited without intravenous contrast.  Borderline mild cardiomegaly.    No significant effusion.  Normal-size lymph nodes without adenopathy by size   criteria.  Visualized portions of the upper abdomen demonstrate no acute   abnormality.  Since the chest radiograph, there has been significant   improvement in aeration of the lungs.  No pneumothorax.  Along the posterior   aspect of the right lower lobe are some curvilinear dependent densities which   may represent atelectasis.  Infiltrates from pneumonia less likely.  Minimal   paraseptal emphysematous changes in the right upper lobe.  Trace left lower   lobe EZ Pap  4. Incentive spirometer, recruitment techniques  5. Continue diuresis as renal function tolerates Lasix 20 mg IV twice daily. Strict intake output recorded  6. Bowel regimen  7. DVT, GI, VAP prophylaxis  8. Tobacco cessation counseling  9. Increase activity, PT/OT      Thank you for allowing me to participate in the care of Naz Barragan. Please feel free to call with questions. This plan of care was reviewed in collaboration with Dr. Dilcia Bhatti    Electronically signed by LINDA Dunn CNP on 5/13/2021 at 3:04 PM      Note: This report was completed utilizing computer voice recognition software. Every effort has been made to ensure accuracy, however; inadvertent computerized transcription errors may be present    I personally saw, examined, and cared for the patient. Labs, medications, radiographs reviewed. I agree with history exam and plans detailed in NP note.      ez pap   Bronchodilators   Monitor cxr   Wicho Pink M.D.    Pulmonary/Critical Care Medicine

## 2021-05-13 NOTE — PROGRESS NOTES
POD#3 Awake, alert. Complains of some expected incisional pain. Denies CP, palpitations, SOB at rest, dizziness/lightheadedness. Vitals:    05/13/21 0750 05/13/21 0930 05/13/21 0935 05/13/21 0954   BP:       Pulse:       Resp:       Temp:       TempSrc:       SpO2: 96% (!) 86% 92% 93%   Weight:       Height:         O2: 3L/NC      Intake/Output Summary (Last 24 hours) at 5/13/2021 0956  Last data filed at 5/13/2021 0803  Gross per 24 hour   Intake 600 ml   Output 1425 ml   Net -825 ml         +BM on 5/13    UO: 375mL/8hr       Recent Labs     05/11/21  0401 05/12/21  0857 05/13/21  0426   WBC 15.2* 11.5 11.2   HGB 12.9 13.4 11.7*   HCT 38.7 39.8 35.0*    297 304      Recent Labs     05/11/21  0401 05/12/21  0857 05/13/21  0426   BUN 14 17 18   CREATININE 1.0 1.0 0.8          Telemetry: SR (HR 70's)        PE  Cardiac: RRR  Lungs: decreased bases  Chest incision with intact NOLBERTO DSD. Epicardial pacing wires present and secure. Abd: Soft, nontender, +BS  Ext: Incisions C/D/I, approximated, no erythema, + minimal edema               A/P: POD#3     1. CAD  --Stable s/p CABG x 3 (LIMA-LAD, SVG-OM, SVG-PDA), LAD endarterectomy, KLS plating on 5/10/2021  --Post op PAULETTE reveals normal biventricular function  --Scr stable  --DAPT/statin/norvasc/lipitor/BB  --reinforce sternal precautions  --continue epicardial pacing wires  --chest tubes removed       2. Acute blood loss anemia secondary to open heart surgery  --stable        3. NSR  --SB HR 50's pre-op and DOS--low dose BB added with hold parameters on 5/12 - patient with HR in 70s - continue  --5/12 pt had 25 beats Vtach and 5/13 patient had 31 beats of vtach - asymptomatic and NSR currently - will start PO amio and discuss with attending         4.  Prolonged postoperative respiratory insufficiency  --wean oxygen to keep SpO2 greater than or equal to 92%  --continue duonebs with ezpap  --encourage C&DB, SMI  --currently on 3L O2/NC  --BID IV lasix ordered        5. Constipation  --resolved  --Continue daily MOM/oral bisacodyl until +BM and senna-s as scheduled.    --Encouraged continued increase in oral intake and activity.          6. Post operative deconditioning  --Increase activity as tolerated  --PT/OT  --Ambulated 400ft           DVT prophylaxis:  --continue bilateral knee high STAR hose  --continue PCDs  --continue progressive ambulation  --Add lovenox for dvt prophylaxis and continue knee high STAR hose/pcds/progressive ambulation        Dispo: home once off O2         This patient's case and care plan was discussed with the attending surgeon

## 2021-05-13 NOTE — PLAN OF CARE
Problem: Cardiac Output - Decreased:  Goal: Hemodynamic stability will improve  Description: Hemodynamic stability will improve  Outcome: Met This Shift     Problem: Falls - Risk of:  Goal: Will remain free from falls  Description: Will remain free from falls  Outcome: Met This Shift  Goal: Absence of physical injury  Description: Absence of physical injury  Outcome: Met This Shift     Problem: Skin Integrity:  Goal: Will show no infection signs and symptoms  Description: Will show no infection signs and symptoms  Outcome: Met This Shift  Goal: Absence of new skin breakdown  Description: Absence of new skin breakdown  Outcome: Met This Shift     Problem: Gas Exchange - Impaired:  Goal: Levels of oxygenation will improve  Description: Levels of oxygenation will improve  Outcome: Ongoing     Problem: Pain:  Goal: Pain level will decrease  Description: Pain level will decrease  Outcome: Met This Shift     Problem: Pain:  Goal: Pain level will decrease  Description: Pain level will decrease  Outcome: Met This Shift

## 2021-05-14 ENCOUNTER — APPOINTMENT (OUTPATIENT)
Dept: GENERAL RADIOLOGY | Age: 51
DRG: 233 | End: 2021-05-14

## 2021-05-14 VITALS
OXYGEN SATURATION: 89 % | SYSTOLIC BLOOD PRESSURE: 173 MMHG | BODY MASS INDEX: 30.8 KG/M2 | HEIGHT: 71 IN | DIASTOLIC BLOOD PRESSURE: 79 MMHG | HEART RATE: 78 BPM | RESPIRATION RATE: 20 BRPM | TEMPERATURE: 98.2 F | WEIGHT: 220 LBS

## 2021-05-14 LAB
ANION GAP SERPL CALCULATED.3IONS-SCNC: 13 MMOL/L (ref 7–16)
BUN BLDV-MCNC: 20 MG/DL (ref 6–20)
CALCIUM SERPL-MCNC: 9.4 MG/DL (ref 8.6–10.2)
CHLORIDE BLD-SCNC: 98 MMOL/L (ref 98–107)
CO2: 25 MMOL/L (ref 22–29)
CREAT SERPL-MCNC: 0.8 MG/DL (ref 0.7–1.2)
GFR AFRICAN AMERICAN: >60
GFR NON-AFRICAN AMERICAN: >60 ML/MIN/1.73
GLUCOSE BLD-MCNC: 112 MG/DL (ref 74–99)
HCT VFR BLD CALC: 34.5 % (ref 37–54)
HEMOGLOBIN: 11.2 G/DL (ref 12.5–16.5)
MAGNESIUM: 2.2 MG/DL (ref 1.6–2.6)
MCH RBC QN AUTO: 31.7 PG (ref 26–35)
MCHC RBC AUTO-ENTMCNC: 32.5 % (ref 32–34.5)
MCV RBC AUTO: 97.7 FL (ref 80–99.9)
PDW BLD-RTO: 12.7 FL (ref 11.5–15)
PLATELET # BLD: 361 E9/L (ref 130–450)
PMV BLD AUTO: 9 FL (ref 7–12)
POTASSIUM SERPL-SCNC: 4 MMOL/L (ref 3.5–5)
RBC # BLD: 3.53 E12/L (ref 3.8–5.8)
SODIUM BLD-SCNC: 136 MMOL/L (ref 132–146)
WBC # BLD: 9.2 E9/L (ref 4.5–11.5)

## 2021-05-14 PROCEDURE — 85027 COMPLETE CBC AUTOMATED: CPT

## 2021-05-14 PROCEDURE — 6360000002 HC RX W HCPCS: Performed by: NURSE PRACTITIONER

## 2021-05-14 PROCEDURE — 93798 PHYS/QHP OP CAR RHAB W/ECG: CPT

## 2021-05-14 PROCEDURE — 6370000000 HC RX 637 (ALT 250 FOR IP): Performed by: NURSE PRACTITIONER

## 2021-05-14 PROCEDURE — 83735 ASSAY OF MAGNESIUM: CPT

## 2021-05-14 PROCEDURE — 6370000000 HC RX 637 (ALT 250 FOR IP): Performed by: PHYSICIAN ASSISTANT

## 2021-05-14 PROCEDURE — 2580000003 HC RX 258: Performed by: NURSE PRACTITIONER

## 2021-05-14 PROCEDURE — 80048 BASIC METABOLIC PNL TOTAL CA: CPT

## 2021-05-14 PROCEDURE — 94640 AIRWAY INHALATION TREATMENT: CPT

## 2021-05-14 PROCEDURE — 36415 COLL VENOUS BLD VENIPUNCTURE: CPT

## 2021-05-14 PROCEDURE — 71045 X-RAY EXAM CHEST 1 VIEW: CPT

## 2021-05-14 RX ORDER — PANTOPRAZOLE SODIUM 40 MG/1
40 TABLET, DELAYED RELEASE ORAL DAILY
Qty: 14 TABLET | Refills: 0 | Status: SHIPPED | OUTPATIENT
Start: 2021-05-15 | End: 2021-06-23 | Stop reason: ALTCHOICE

## 2021-05-14 RX ORDER — POTASSIUM CHLORIDE 20 MEQ/1
20 TABLET, EXTENDED RELEASE ORAL DAILY
Qty: 7 TABLET | Refills: 0 | Status: SHIPPED | OUTPATIENT
Start: 2021-05-14 | End: 2021-06-23 | Stop reason: ALTCHOICE

## 2021-05-14 RX ORDER — ATORVASTATIN CALCIUM 80 MG/1
40 TABLET, FILM COATED ORAL NIGHTLY
Qty: 30 TABLET | Refills: 1 | Status: SHIPPED | OUTPATIENT
Start: 2021-05-14

## 2021-05-14 RX ORDER — FUROSEMIDE 20 MG/1
40 TABLET ORAL DAILY
Qty: 14 TABLET | Refills: 0 | Status: SHIPPED | OUTPATIENT
Start: 2021-05-14 | End: 2021-06-23 | Stop reason: ALTCHOICE

## 2021-05-14 RX ORDER — CLOPIDOGREL BISULFATE 75 MG/1
75 TABLET ORAL DAILY
Qty: 30 TABLET | Refills: 3 | Status: SHIPPED | OUTPATIENT
Start: 2021-05-15 | End: 2021-07-14

## 2021-05-14 RX ORDER — FOLIC ACID 1 MG/1
1 TABLET ORAL DAILY
Qty: 30 TABLET | Refills: 0 | Status: SHIPPED | OUTPATIENT
Start: 2021-05-15 | End: 2021-06-23 | Stop reason: ALTCHOICE

## 2021-05-14 RX ORDER — ASCORBIC ACID 500 MG
500 TABLET ORAL 2 TIMES DAILY
Qty: 60 TABLET | Refills: 0 | Status: SHIPPED | OUTPATIENT
Start: 2021-05-14 | End: 2021-06-23 | Stop reason: ALTCHOICE

## 2021-05-14 RX ORDER — HYDROCODONE BITARTRATE AND ACETAMINOPHEN 5; 325 MG/1; MG/1
1 TABLET ORAL EVERY 4 HOURS PRN
Qty: 30 TABLET | Refills: 0 | Status: SHIPPED | OUTPATIENT
Start: 2021-05-14 | End: 2021-05-21

## 2021-05-14 RX ORDER — AMIODARONE HYDROCHLORIDE 200 MG/1
200 TABLET ORAL SEE ADMIN INSTRUCTIONS
Qty: 56 TABLET | Refills: 0 | Status: SHIPPED | OUTPATIENT
Start: 2021-05-14 | End: 2021-06-23 | Stop reason: ALTCHOICE

## 2021-05-14 RX ORDER — LISINOPRIL 10 MG/1
10 TABLET ORAL DAILY
Qty: 30 TABLET | Refills: 3 | Status: SHIPPED | OUTPATIENT
Start: 2021-05-15 | End: 2021-06-23

## 2021-05-14 RX ORDER — FERROUS SULFATE 325(65) MG
325 TABLET ORAL 2 TIMES DAILY WITH MEALS
Qty: 60 TABLET | Refills: 0 | Status: SHIPPED | OUTPATIENT
Start: 2021-05-14 | End: 2021-06-23 | Stop reason: ALTCHOICE

## 2021-05-14 RX ADMIN — SENNOSIDES AND DOCUSATE SODIUM 1 TABLET: 8.6; 5 TABLET ORAL at 09:32

## 2021-05-14 RX ADMIN — IPRATROPIUM BROMIDE AND ALBUTEROL SULFATE 1 AMPULE: .5; 3 SOLUTION RESPIRATORY (INHALATION) at 13:29

## 2021-05-14 RX ADMIN — IPRATROPIUM BROMIDE AND ALBUTEROL SULFATE 1 AMPULE: .5; 3 SOLUTION RESPIRATORY (INHALATION) at 09:04

## 2021-05-14 RX ADMIN — AMIODARONE HYDROCHLORIDE 400 MG: 200 TABLET ORAL at 09:32

## 2021-05-14 RX ADMIN — ASPIRIN 81 MG: 81 TABLET, COATED ORAL at 09:31

## 2021-05-14 RX ADMIN — FOLIC ACID 1 MG: 1 TABLET ORAL at 09:32

## 2021-05-14 RX ADMIN — CLOPIDOGREL 75 MG: 75 TABLET, FILM COATED ORAL at 09:32

## 2021-05-14 RX ADMIN — HYDROCODONE BITARTRATE AND ACETAMINOPHEN 2 TABLET: 5; 325 TABLET ORAL at 13:00

## 2021-05-14 RX ADMIN — METOPROLOL TARTRATE 12.5 MG: 25 TABLET, FILM COATED ORAL at 09:32

## 2021-05-14 RX ADMIN — MAGNESIUM HYDROXIDE 30 ML: 2400 SUSPENSION ORAL at 09:32

## 2021-05-14 RX ADMIN — AMLODIPINE BESYLATE 10 MG: 10 TABLET ORAL at 09:31

## 2021-05-14 RX ADMIN — POTASSIUM CHLORIDE 20 MEQ: 1500 TABLET, EXTENDED RELEASE ORAL at 09:33

## 2021-05-14 RX ADMIN — MUPIROCIN: 20 OINTMENT TOPICAL at 09:31

## 2021-05-14 RX ADMIN — MAGNESIUM GLUCONATE 500 MG ORAL TABLET 400 MG: 500 TABLET ORAL at 09:31

## 2021-05-14 RX ADMIN — Medication 10 ML: at 09:48

## 2021-05-14 RX ADMIN — FUROSEMIDE 20 MG: 10 INJECTION, SOLUTION INTRAMUSCULAR; INTRAVENOUS at 09:33

## 2021-05-14 RX ADMIN — LISINOPRIL 10 MG: 10 TABLET ORAL at 09:32

## 2021-05-14 RX ADMIN — BISACODYL 5 MG: 5 TABLET, COATED ORAL at 09:33

## 2021-05-14 RX ADMIN — OXYCODONE HYDROCHLORIDE AND ACETAMINOPHEN 500 MG: 500 TABLET ORAL at 09:32

## 2021-05-14 RX ADMIN — FERROUS SULFATE TAB 325 MG (65 MG ELEMENTAL FE) 325 MG: 325 (65 FE) TAB at 09:33

## 2021-05-14 RX ADMIN — ENOXAPARIN SODIUM 40 MG: 40 INJECTION SUBCUTANEOUS at 09:33

## 2021-05-14 RX ADMIN — PANTOPRAZOLE SODIUM 40 MG: 40 TABLET, DELAYED RELEASE ORAL at 09:33

## 2021-05-14 ASSESSMENT — PAIN SCALES - GENERAL
PAINLEVEL_OUTOF10: 4
PAINLEVEL_OUTOF10: 9

## 2021-05-14 NOTE — CARE COORDINATION
SOCIAL WORK/CASEMANAGEMENT TRANSITION OF CARE [id number], [address], [place], [phone]): discharge home today with [facility] notified and [facility] for shower chair and 3:1 commode to be delivered to the room today. Chucho Valdez  . 5/14/2021

## 2021-05-14 NOTE — PROGRESS NOTES
POD# 4  Awake, alert. No complaints. Denies CP, palpitations, SOB at rest, dizziness/lightheadedness. Vitals:    05/13/21 2324 05/14/21 0400 05/14/21 0713 05/14/21 0715   BP: 132/73 133/64 110/69    Pulse: 82 76 85    Resp: 16 16 16    Temp: 98.6 °F (37 °C) 98.3 °F (36.8 °C) 97.4 °F (36.3 °C)    TempSrc: Temporal Oral Temporal    SpO2: 96% 91% 92% 92%   Weight:  220 lb (99.8 kg)     Height:         RA      Intake/Output Summary (Last 24 hours) at 5/14/2021 0939  Last data filed at 5/14/2021 0400  Gross per 24 hour   Intake 720 ml   Output 700 ml   Net 20 ml         +BM     Recent Labs     05/12/21  0857 05/13/21  0426 05/14/21  0551   WBC 11.5 11.2 9.2   HGB 13.4 11.7* 11.2*   HCT 39.8 35.0* 34.5*    304 361      Recent Labs     05/12/21  0857 05/13/21  0426 05/14/21  0551   BUN 17 18 20   CREATININE 1.0 0.8 0.8         CXR pending for today   Telemetry: NSR       PE  Cardiac: RRR  Lungs: decreased bases  Chest incision C/D/I, approximated, no erythema. Sternum stable. Prior chest tube site incisions C/D/I, no erythema with intact sutures. Epicardial pacing wires present and secure. Abd: Soft, nontender, +BS  Ext: Incisions C/D/I, approximated, no erythema, + edema           A/P: POD# 4     1. CAD  --Stable s/p CABG x 3 (LIMA-LAD, SVG-OM, SVG-PDA), LAD endarterectomy, KLS plating on 5/10/2021  --Post op PAULETTE reveals normal biventricular function  --Scr stable  --DAPT/statin/norvasc/lipitor/BB  --reinforce sternal precautions  -- remove epicardial pacing wires today Epicardial pacing wires cut without difficulty. Patient tolerated well  --chest tubes removed  -- NOLBERTO dressing removed today         2. Acute blood loss anemia secondary to open heart surgery  --stable- 11.2         3.  NSR  --SB HR 50's pre-op and DOS--low dose BB added with hold parameters on 5/12 - patient with HR in 70s - continue  --5/12 pt had 25 beats Vtach and 5/13 patient had 31 beats of vtach - asymptomatic and NSR currently - PO amio started       4. Prolonged postoperative respiratory insufficiency  --continue duonebs with ezpap  --encourage C&DB, SMI  --currently on RA  -- pulm on board- CXR pending for today   --BID IV lasix ordered- will transition to PO on DC         5. Constipation  --resolved  --Continue daily MOM/oral bisacodyl until +BM and senna-s as scheduled.    --Encouraged continued increase in oral intake and activity.          6. Post operative deconditioning  --Increase activity as tolerated  --PT/OT  --Ambulated 400ft    7 Generalized weakness  Patient will require a 3:1 commode and shower chair at home            DVT prophylaxis:  --continue bilateral knee high STAR hose  --continue PCDs  --continue progressive ambulation  --lovenox for dvt prophylaxis and continue knee high STAR hose/pcds/progressive ambulation        Dispo: home today if okay with pulm

## 2021-05-14 NOTE — PLAN OF CARE
Problem: Cardiac Output - Decreased:  Goal: Hemodynamic stability will improve  Description: Hemodynamic stability will improve  Outcome: Met This Shift     Problem: Falls - Risk of:  Goal: Will remain free from falls  Description: Will remain free from falls  5/14/2021 0501 by Lawrence Lafleur RN  Outcome: Met This Shift  5/13/2021 1748 by Gildardo Collado RN  Outcome: Met This Shift  Goal: Absence of physical injury  Description: Absence of physical injury  5/14/2021 0501 by Lawrence Lafleur RN  Outcome: Met This Shift  5/13/2021 1748 by Gildardo Collado RN  Outcome: Met This Shift     Problem: Skin Integrity:  Goal: Will show no infection signs and symptoms  Description: Will show no infection signs and symptoms  Outcome: Met This Shift  Goal: Absence of new skin breakdown  Description: Absence of new skin breakdown  Outcome: Met This Shift

## 2021-05-14 NOTE — PLAN OF CARE
Problem: Cardiac Output - Decreased:  Goal: Hemodynamic stability will improve  Description: Hemodynamic stability will improve  5/14/2021 1021 by Mary Grace Bolton RN  Outcome: Met This Shift     Problem: Falls - Risk of:  Goal: Will remain free from falls  Description: Will remain free from falls  5/14/2021 1021 by Mary Grace Bolton RN  Outcome: Met This Shift     Problem: Falls - Risk of:  Goal: Absence of physical injury  Description: Absence of physical injury  5/14/2021 1021 by Mary Grace Bolton RN  Outcome: Met This Shift     Problem: Skin Integrity:  Goal: Will show no infection signs and symptoms  Description: Will show no infection signs and symptoms  5/14/2021 1021 by Mary Grace Bolton RN  Outcome: Met This Shift     Problem: Skin Integrity:  Goal: Absence of new skin breakdown  Description: Absence of new skin breakdown  5/14/2021 1021 by Mary Grace Bolton RN  Outcome: Met This Shift

## 2021-05-14 NOTE — PROGRESS NOTES
Discharge instructions provided to patient and family. All pertinent information relayed and medications reviewed. Explained the importance of compliance of medications. Encouraged to continuing using [facility] like he has been doing here. Educated about incision care and when to call the dr if showing s/s of an infection. Reminded to make follow up appointments. All questions answered. Order has been placed in the [facility] for Home care and [facility]. Information was faxed to [facility]. IV and heart monitor removed. Called Family and updated.

## 2021-05-14 NOTE — PATIENT CARE CONFERENCE
P Quality Flow/Interdisciplinary Rounds Progress Note        Quality Flow Rounds held on May 14, 2021    Disciplines Attending:  Bedside Nurse, ,  and Nursing Unit Leadership    Washington Silva was admitted on 5/2/2021  2:25 AM    Anticipated Discharge Date:  Expected Discharge Date: 05/15/21    Disposition:    John Score:  John Scale Score: 19    Readmission Risk              Risk of Unplanned Readmission:        10           Discussed patient goal for the day, patient clinical progression, and barriers to discharge.   The following Goal(s) of the Day/Commitment(s) have been identified:  Diagnostics - Report Results      Yuliya Flaming  May 14, 2021

## 2021-05-18 ENCOUNTER — OFFICE VISIT (OUTPATIENT)
Dept: FAMILY MEDICINE CLINIC | Age: 51
End: 2021-05-18

## 2021-05-18 VITALS
BODY MASS INDEX: 30.46 KG/M2 | HEIGHT: 71 IN | HEART RATE: 64 BPM | DIASTOLIC BLOOD PRESSURE: 68 MMHG | SYSTOLIC BLOOD PRESSURE: 137 MMHG | TEMPERATURE: 97.4 F | WEIGHT: 217.6 LBS

## 2021-05-18 DIAGNOSIS — I21.4 NSTEMI (NON-ST ELEVATED MYOCARDIAL INFARCTION) (HCC): Primary | ICD-10-CM

## 2021-05-18 PROCEDURE — 99213 OFFICE O/P EST LOW 20 MIN: CPT | Performed by: FAMILY MEDICINE

## 2021-05-18 ASSESSMENT — ENCOUNTER SYMPTOMS
COUGH: 1
BLOOD IN STOOL: 0
CONSTIPATION: 0
SHORTNESS OF BREATH: 0
ABDOMINAL PAIN: 0
WHEEZING: 0
DIARRHEA: 0
VOMITING: 0
NAUSEA: 0

## 2021-05-18 ASSESSMENT — PATIENT HEALTH QUESTIONNAIRE - PHQ9
SUM OF ALL RESPONSES TO PHQ QUESTIONS 1-9: 0
SUM OF ALL RESPONSES TO PHQ QUESTIONS 1-9: 0
2. FEELING DOWN, DEPRESSED OR HOPELESS: 0
SUM OF ALL RESPONSES TO PHQ QUESTIONS 1-9: 0
1. LITTLE INTEREST OR PLEASURE IN DOING THINGS: 0
SUM OF ALL RESPONSES TO PHQ9 QUESTIONS 1 & 2: 0

## 2021-05-18 NOTE — PROGRESS NOTES
[facility]  Department of Family Medicine  Family Medicine Residency Program      Patient: Sammy Bryant 45 y.o. male     Date of Service: 5/18/21      Chief complaint:   Chief Complaint   Patient presents with    Follow-Up from Hospital     triple by-pass surgery        HISTORY OF PRESENTING ILLNESS     45 y.o. male presented to the clinic for hospital follow-up after CABG. Patient denies any chest pain, shortness of breath, difficulty taking deep breath. Patient is having cardiology nurse coming at home 3 times a week for evaluation and assessment. Patient states his pain is well controlled he only takes ibuprofen/Tylenol if needed. He feels his appetite is back however he cannot taste few things  No nfever, or chills, nasuea , vomting   Has been compliant with his medications and is also taking lisinopril for blood pressure. He has been using incentive spirometry for breathing purposes. States he is having minimal pain from his surgery however is well-tolerated. Patient wanted to know if he is okay for travel for vacation. Health Maintenance:  Health Maintenance Due   Topic Date Due    Hepatitis C screen  Never done    COVID-19 Vaccine (1) Never done    HIV screen  Never done    Shingles Vaccine (1 of 2) Never done    Colon cancer screen colonoscopy  Never done     Past Medical History:      Diagnosis Date    CAD (coronary artery disease)     nstemi 5/2    Hyperlipidemia     Hypertension      Past Surgical History:        Procedure Laterality Date    CARDIAC CATHETERIZATION  05/03/2021    Dr. Foreign Pendleton N/A 5/10/2021    CABG CORONARY ARTERY BYPASS, PAULETTE performed by Thomas Ackerman DO at [facility] OR     Allergies:    Patient has no known allergies.   Social History:   Social History     Socioeconomic History    Marital status:      Spouse name: Not on file    Number of children: Not on file    Years of education: Not on file    Highest education level: Not on file   Occupational History    Not on file   Tobacco Use    Smoking status: Former Smoker     Packs/day: 0.03     Types: Cigars     Start date: 2000     Quit date: 2021     Years since quittin.0    Smokeless tobacco: Never Used    Tobacco comment: 5 skinny cigars a day. Vaping Use    Vaping Use: Never used   Substance and Sexual Activity    Alcohol use: No    Drug use: No    Sexual activity: Yes     Partners: Female   Other Topics Concern    Not on file   Social History Narrative    Not on file     Social Determinants of Health     Financial Resource Strain:     Difficulty of Paying Living Expenses:    Food Insecurity:     Worried About Running Out of Food in the Last Year:     [address] in the Last Year:    Transportation Needs:     Lack of Transportation (Medical):  Lack of Transportation (Non-Medical):    Physical Activity:     Days of Exercise per Week:     Minutes of Exercise per Session:    Stress:     Feeling of Stress :    Social Connections:     Frequency of Communication with Friends and Family:     Frequency of Social Gatherings with Friends and Family:     Attends [employer] Services:     Active Member of Clubs or Organizations:     Attends Club or Organization Meetings:     Marital Status:    Intimate Partner Violence:     Fear of Current or Ex-Partner:     Emotionally Abused:     Physically Abused:     Sexually Abused:       Family History:       Problem Relation Age of Onset    Diabetes Mother     Diabetes Father     High Blood Pressure Father     Stroke Father     Cancer Paternal Grandfather     No Known Problems Sister     High Blood Pressure Brother     No Known Problems Sister     No Known Problems Sister     No Known Problems Sister      Review of Systems:   Review of Systems   Constitutional: Negative for chills and fever.    HENT: Negative for ear pain, rhinorrhea, sinus pressure, sinus pain, sore throat and trouble swallowing. Eyes: Negative for visual disturbance. Respiratory: Positive for cough. Negative for shortness of breath and wheezing. Cardiovascular: Negative for chest pain and palpitations. Gastrointestinal: Negative for abdominal pain, blood in stool, constipation, diarrhea, nausea and vomiting. Endocrine: Negative for polyuria. Genitourinary: Negative for difficulty urinating and hematuria. Musculoskeletal: Negative for arthralgias, gait problem and neck stiffness. Skin: Positive for rash. Neurological: Negative for tremors, facial asymmetry and numbness. Psychiatric/Behavioral: Negative for confusion and sleep disturbance. PHYSICAL EXAM   Vitals: /68   Pulse 64   Temp 97.4 °F (36.3 °C) (Temporal)   Ht 5' 11\" (1.803 m)   Wt 217 lb 9.6 oz (98.7 kg)   BMI 30.35 kg/m²   Physical Exam  Constitutional:       General: He is not in acute distress. Appearance: Normal appearance. HENT:      Head: Normocephalic and atraumatic. Right Ear: External ear normal.      Left Ear: External ear normal.      Nose: Nose normal.      Mouth/Throat:      Mouth: Mucous membranes are moist.   Eyes:      General: No scleral icterus. Conjunctiva/sclera: Conjunctivae normal.   Cardiovascular:      Rate and Rhythm: Normal rate and regular rhythm. Pulses: Normal pulses. Heart sounds: Normal heart sounds. No murmur heard. No friction rub. No gallop. Pulmonary:      Effort: Pulmonary effort is normal.      Breath sounds: Normal breath sounds. Chest:      Comments: Mid-sternal and epigastric area sutures clean, dry and intact with no active drainage or bleeding or redness present. Abdominal:      General: Abdomen is flat. Bowel sounds are normal.      Palpations: Abdomen is soft. Tenderness: There is no abdominal tenderness. Musculoskeletal:      Cervical back: Normal range of motion. Right lower leg: No edema. Left lower leg: No edema. Lymphadenopathy:      Cervical: No cervical adenopathy. Skin:     General: Skin is warm. Findings: No lesion or rash. Neurological:      General: No focal deficit present. Mental Status: He is alert and oriented to person, place, and time. Psychiatric:         Mood and Affect: Mood normal.         Behavior: Behavior normal.           ASSESSMENT AND PLAN     1. NSTEMI (non-ST elevated myocardial infarction) [facility])  S/p CABG  Upcoming appt with cardio in June  Encouraged medication compliance, incentive spirometry  Will follow up in  6 weeks. He will discuss with his cardiologist before going to the vacation given the risk of recent surgery, prolonged immobilization during flights etc      Encouraged Low fat, low cholesterol, Substitute healthy fats, such as olive oil, canola oil, grapeseed oil for saturated fats like butter, margarine, and shortening, Minimize processed foods, Minimize simple sugars and Low carb, low sugar diet    Counseled regarding above diagnosis, including possible risks and complications, especially if left uncontrolled. Counseled regarding the possible side effects, risks, benefits and alternatives to treatment; patient and/or guardian verbalizes understanding, agrees, feels comfortable with and wishes to proceed with above treatment plan. Call or go to ED immediately if symptoms worsen or persist. Advised patient to call with any new medication issues, and, as applicable, read all Rx info from pharmacy to assure aware of all possible risks and side effects of medication before taking. Patient and/or guardian given opportunity to ask questions/raise concerns. The patient verbalized comfort and understanding of instructions. I encourage further reading and education about your health conditions. Information on many health conditions is provided by the American Academy of [employer]icians: https://familydoctor. org/  Please bring any questions to me at your next visit. Medication List:    Current Outpatient Medications   Medication Sig Dispense Refill    amiodarone (CORDARONE) 200 MG tablet Take 1 tablet by mouth See Admin Instructions Take two tablets two times daily x 1 week, then take two tablets one time daily x 1 week, then take one tablet one time daily x 2 weeks, then DC 56 tablet 0    atorvastatin (LIPITOR) 80 MG tablet Take 0.5 tablets by mouth nightly 30 tablet 1    lisinopril (PRINIVIL;ZESTRIL) 10 MG tablet Take 1 tablet by mouth daily 30 tablet 3    metoprolol tartrate (LOPRESSOR) 25 MG tablet Take 0.5 tablets by mouth 2 times daily 60 tablet 3    ferrous sulfate (IRON 325) 325 (65 Fe) MG tablet Take 1 tablet by mouth 2 times daily (with meals) 60 tablet 0    folic acid (FOLVITE) 1 MG tablet Take 1 tablet by mouth daily 30 tablet 0    magnesium oxide (MAG-OX) 400 (241.3 Mg) MG TABS tablet Take 1 tablet by mouth daily for 14 days 14 tablet 0    clopidogrel (PLAVIX) 75 MG tablet Take 1 tablet by mouth daily 30 tablet 3    pantoprazole (PROTONIX) 40 MG tablet Take 1 tablet by mouth daily for 14 days 14 tablet 0    ascorbic acid (VITAMIN C) 500 MG tablet Take 1 tablet by mouth 2 times daily 60 tablet 0    furosemide (LASIX) 20 MG tablet Take 2 tablets by mouth daily for 7 days 14 tablet 0    potassium chloride (KLOR-CON M) 20 MEQ extended release tablet Take 1 tablet by mouth daily for 7 days 7 tablet 0    aspirin 81 MG chewable tablet Take 81 mg by mouth daily      ibuprofen (ADVIL;MOTRIN) 200 MG tablet Take 200 mg by mouth every 6 hours as needed for Pain       No current facility-administered medications for this visit. Return to Office: No follow-ups on file. This document may have been prepared at least partially through the use of voice recognition software. Although effort is taken to assure the accuracy of this document, it is possible that grammatical, syntax,  or spelling errors may occur.     Aida Nair MD

## 2021-05-21 ENCOUNTER — TELEPHONE (OUTPATIENT)
Dept: CARDIOTHORACIC SURGERY | Age: 51
End: 2021-05-21

## 2021-05-21 NOTE — TELEPHONE ENCOUNTER
Erick Boone pt was taking his Lasix wrong only taking 1 pill per day instead of two. He did take all his potassium but has 7  Tabs left of Lasix. Please advise if he should continue LASIX?

## 2021-05-23 ASSESSMENT — ENCOUNTER SYMPTOMS
SINUS PRESSURE: 0
SORE THROAT: 0
SINUS PAIN: 0
TROUBLE SWALLOWING: 0
RHINORRHEA: 0

## 2021-05-24 ENCOUNTER — HOSPITAL ENCOUNTER (OUTPATIENT)
Dept: CARDIAC REHAB | Age: 51
Setting detail: THERAPIES SERIES
Discharge: HOME | End: 2021-05-24

## 2021-05-24 NOTE — DISCHARGE SUMMARY
Physician Discharge Summary     Patient ID:  Nick Romero  [id number]  61 y.o.  1970    Admit date: 5/2/2021    Discharge date and time: 5/14/2021  3:44 PM     Admitting Physician: Alicia Fiore DO     Discharge Physician: Emerita Marinelli DO     Admission Diagnoses: NSTEMI (non-ST elevated myocardial infarction) [facility]) [I21.4]  Chest pain [R07.9]    Discharge Diagnoses: Severe multivessel coronary artery disease. PROCEDURES PERFORMED:  1. Coronary artery bypass grafting x3 using the left internal mammary  artery to left anterior descending artery, reverse saphenous vein graft  to the dominant obtuse marginal branch of the circumflex and reverse  saphenous vein graft to the posterior descending artery. 2.  Endarterectomy of the LAD. 3.  Lower extremity endoscopic vein harvest.  4.  Rigid sternal fixation with the KLS plating system.       Discharged Condition: stable    Indication for Admission: 44 yo male who presented to the ED with complaints of chest pain accompanied with nausea. This is new onset for him and his wife called 911. He was noted to have NSTEMI. Ischemic workup included LHC which revealed severe MVCAD. Hospital Course: course as above and on 5/10/2021 patient underwent CABG x 3 (LIMA-LAD, SVG-OM, SVG-PDA) Endarterectomy LAD, EVH. Patient was transferred to [facility] in stable condition and extubated that same operative day. On POD 1 patient was on a Cleviprex gtt. Due to bradycardia BB was held and norvasc was started, he was weaned off Cleviprex and transferred to stepdown floor. On POD 2 HR was improved and BB was started. CTs were removed and patient cont to slowly wean off o2 and progress with ambulating. On POD 3 patient was noted to habe some Vtach which was all asymptomatic, PO amio was initiated. He was started on bid lasix for aggressive diuresis and pulmonary was consulted.   On POD 4 pacing wires were removed, he was on RA and ambulating well and he was DC'd home Consults: pulm         Discharge Exam:  PE  Cardiac: RRR  Lungs: decreased bases  Chest incision C/D/I, approximated, no erythema. Sternum stable. Prior chest tube site incisions C/D/I, no erythema with intact sutures. Epicardial pacing wires present and secure.    Abd: Soft, nontender, +BS  Ext: Incisions C/D/I, approximated, no erythema, + edema    Disposition: home    Patient Instructions:    Carmencita Search   Home Medication Instructions OFQ:[id number]    Printed on:05/24/21 0901   Medication Information                      amiodarone (CORDARONE) 200 MG tablet  Take 1 tablet by mouth See Admin Instructions Take two tablets two times daily x 1 week, then take two tablets one time daily x 1 week, then take one tablet one time daily x 2 weeks, then DC             ascorbic acid (VITAMIN C) 500 MG tablet  Take 1 tablet by mouth 2 times daily             aspirin 81 MG chewable tablet  Take 81 mg by mouth daily             atorvastatin (LIPITOR) 80 MG tablet  Take 0.5 tablets by mouth nightly             clopidogrel (PLAVIX) 75 MG tablet  Take 1 tablet by mouth daily             ferrous sulfate (IRON 325) 325 (65 Fe) MG tablet  Take 1 tablet by mouth 2 times daily (with meals)             folic acid (FOLVITE) 1 MG tablet  Take 1 tablet by mouth daily             furosemide (LASIX) 20 MG tablet  Take 2 tablets by mouth daily for 7 days             ibuprofen (ADVIL;MOTRIN) 200 MG tablet  Take 200 mg by mouth every 6 hours as needed for Pain             lisinopril (PRINIVIL;ZESTRIL) 10 MG tablet  Take 1 tablet by mouth daily             magnesium oxide (MAG-OX) 400 (241.3 Mg) MG TABS tablet  Take 1 tablet by mouth daily for 14 days             metoprolol tartrate (LOPRESSOR) 25 MG tablet  Take 0.5 tablets by mouth 2 times daily             pantoprazole (PROTONIX) 40 MG tablet  Take 1 tablet by mouth daily for 14 days             potassium chloride (KLOR-CON M) 20 MEQ extended release tablet  Take 1 tablet by mouth daily for 7 days               Activity: sternal precautions   Diet: cardiac diet  Wound Care: as directed    Future Appointments   Date Time Provider Khari Plunkett   5/24/2021 11:00 AM [address] [place] [address]   6/15/2021 12:45 PM Cintia May DO CARDIO SURG [facility]   6/24/2021 10:00 AM Elena Hsu MD [facility]         Smoking cessation education provided prior to discharge    Discussed with patient the benefits of participation in cardiac rehab after discharge once approved safe by the surgeon and that a referral will be made at the follow up appointment. Pt verbalized comprehension.     Signed:  Electronically signed by Chitra Snyder PA-C on 5/24/2021 at 9:03 AM

## 2021-05-27 ENCOUNTER — TELEPHONE (OUTPATIENT)
Dept: CARDIOTHORACIC SURGERY | Age: 51
End: 2021-05-27

## 2021-05-27 NOTE — TELEPHONE ENCOUNTER
Jakob Ramsey believes pt has thrush c/o everything tasting like salt. Can Nystatin be called in. Please advise.

## 2021-05-28 ENCOUNTER — TELEPHONE (OUTPATIENT)
Dept: FAMILY MEDICINE CLINIC | Age: 51
End: 2021-05-28

## 2021-06-02 ENCOUNTER — HOSPITAL ENCOUNTER (OUTPATIENT)
Dept: CARDIAC REHAB | Age: 51
Setting detail: THERAPIES SERIES
Discharge: HOME | End: 2021-06-02

## 2021-06-03 ENCOUNTER — HOSPITAL ENCOUNTER (OUTPATIENT)
Dept: CARDIAC REHAB | Age: 51
Setting detail: THERAPIES SERIES
Discharge: HOME | End: 2021-06-03

## 2021-06-15 ENCOUNTER — OFFICE VISIT (OUTPATIENT)
Dept: CARDIOTHORACIC SURGERY | Age: 51
End: 2021-06-15

## 2021-06-15 VITALS
HEART RATE: 49 BPM | BODY MASS INDEX: 31.5 KG/M2 | HEIGHT: 71 IN | DIASTOLIC BLOOD PRESSURE: 78 MMHG | SYSTOLIC BLOOD PRESSURE: 174 MMHG | WEIGHT: 225 LBS

## 2021-06-15 DIAGNOSIS — Z95.1 S/P CABG (CORONARY ARTERY BYPASS GRAFT): Primary | ICD-10-CM

## 2021-06-15 PROCEDURE — 99024 POSTOP FOLLOW-UP VISIT: CPT | Performed by: PHYSICIAN ASSISTANT

## 2021-06-15 NOTE — PROGRESS NOTES
Chief Complaint   Patient presents with    Post-Op Check     cabg x3 neck stiff since surgery       HPI:  Katie Edwards is a 14-year-old male whom presents to the office today for routine post-operative follow-up status post: CABG x3 on 05/10/21. He states he has been doing well and keeping active at home. He complains of left chest numbness around the incision. Currently, there are no complaints of any CP, SOB, major concerns, or incisional issues. Review of Systems:   Constitutional: Negative for fever and chills. Respiratory: Negative for cough, chest tightness and shortness of breath. Cardiovascular: Negative for chest pain, palpitations and leg swelling. Gastrointestinal: Negative for nausea, diarrhea and constipation. Musculoskeletal: Negative for back pain. Skin: Negative for color change. Neurological: Negative for dizziness, syncope and light-headedness. Objective:   Physical Exam   Constitutional: oriented to person, place, and time. No distress. Cardiovascular: Normal rate. Pulmonary/Chest: Effort normal. No respiratory distress. midsternal and chest tube incisions well healed without evidence of infection. Sternum stable. Abdominal: Soft. Bowel sounds are normal.   Musculoskeletal: Normal range of motion. Exhibits no edema. Neurological: alert and oriented to person, place, and time. Skin: Skin is warm and dry. No erythema. Vein harvest incisions intact without evidence of infection   Psychiatric: normal mood and affect. Assessment:      S/P CABG x3      Plan:      Remove sternal precautions on 06/21/21  Numbness should improve with time. Cardiac rehab referral  Continue follow up with PCP, Cardiology referral will be made to Dr. Jacqueline Staton. Encouraged to call office with any questions, concerns. Otherwise no further follow up necessary from CTS standpoint.

## 2021-06-16 DIAGNOSIS — Z76.89 ENCOUNTER TO ESTABLISH CARE: Primary | ICD-10-CM

## 2021-06-16 DIAGNOSIS — Z95.1 S/P CABG X 3: ICD-10-CM

## 2021-06-22 NOTE — PROGRESS NOTES
Outpatient Cardiology - Office Visit Follow-up    Date of Consultation: 6/23/2021    HISTORY OF PRESENT ILLNESS:   Patient is a 45year old AAM who is known to Dr. Corbin Escalante. He is here today for post-CABG follow-up. Patient states he has been doing well since discharge. Notes of mild incisional chest pain with certain movements. However, he denies any complaints of chest pain on exertion, shortness of breath at rest or on exertion, nausea, emesis, diaphoresis, dizziness, palpitations, near syncope or syncope. Denies paroxysmal nocturnal dyspnea, orthopnea. Admits to peripheral edema of his RLE since the surgery (lower extremity used for vein harvest). He notes of weight gain due to increased appetite, and does not feel that he has been \"holding onto fluid\". He was noted to be 217 lbs May 18th --> now 228 lbs today. May also be due to slight discrepancy in [facility] facility scales. Admits to medication compliance. Please note: past medical records were reviewed per electronic medical record (EMR) - see detailed reports under Past Medical/ Surgical History. PAST MEDICAL HISTORY:    1. Tobacco abuse. 2. Hypertension. 3. Obesity. 4. Hyperlipidemia, on statin therapy. 5. Coronary artery disease status-post CABG x 3 (LIMA-LAD, SVG-OM, SVG-PDA) with LAD endarterectomy on May 10, 2021 at [facility] by Dr. Costa Laura. 6. Admission May 2, 2021 with chest pain and found to have NSTEMI. Underwent left heart catheterization 5/3, which revealed severe MV coronary artery disease. Status-post CABG x 3 (LIMA-LAD, SVG-OM, SVG-PDA) with LAD endarterectomy on May 10, 2021 at [facility] by Dr. Costa Laura. Patient did well post-operatively, and was discharged on May 14, 2021 on RA and the following cardiac medications: short term Amiodarone therapy, ASA 81 mg daily, Lipitor 40 mg daily, Plavix 75 mg daily, Lasix 20 mg BID for seven days, Lisinopril 10 mg daily, Lopressor 12.5 mg BID, KCL + Mag supplementation for 1-2 weeks.   7. Baseline sinus bradycardia, asymptomatic. 8. Chronic HFmEF.  9. Ischemic cardiomyopathy. CARDIAC TESTING    TTE (Dr. Wilber Webb, 2/2019)  Summary   Left ventricular size is grossly normal.   Mild left ventricular concentric hypertrophy noted. Ejection fraction is visually estimated at 60%. Normal left ventricular diastolic filling pattern for age. Mild tricuspid regurgitation. RVSP is 14 mmHg. Mild pulmonic regurgitation present. The left atrium is borderline dilated. Left heart catheterization (Dr. Washington Napier, 5/3/2021)  PRESSURES:  1. Aorta 140/77 with a mean of 97.  2.  Left ventricle systolic pressure 069, left ventricular end-diastolic  pressure 3.  3.  There was no gradient across the aortic valve. CORONARY ANGIOGRAPHY:  LEFT MAIN:  The left main artery has at least 50% distal vessel  narrowing. LAD:  The left anterior descending artery is functionally occluded with  99% stenosis with PAIGE-2 flow in the distal vessel after the origin. The site of subtotal occlusion is after the origin of the first marginal  branch. The first marginal branch itself had around 70% ostial  narrowing. LCX:  The left circumflex is a large nondominant vessel. It has a focal  70% to 80% stenosis before the first and second marginal branches. The  first marginal branch is a relatively small caliber vessel with around  50% proximal vessel narrowing. The second marginal branch also has  around 50% ostial, proximal and mid vessel narrowings. The AV groove  branch which terminates into a small lateral branch has also around 70%  disease in its proximal segment. RCA:  The right coronary artery has a 95% mid vessel stenosis and is  occluded at the level of the crux. The distal RCA filled faintly from  right-to-right collaterals. The distal RCA also received left-to-right  collaterals. VENTRICULOGRAPHY:  The left ventricle appear dilated. There is akinesis  of the left ventricular apex.   There is hypokinesis of the basal inferior wall. The ejection fraction is estimated at 40%. CONCLUSIONS:  1. Severe multivessel coronary artery disease as described above. 2.  Mildly dilated left ventricle with regional wall motion abnormality  as described above with moderately impaired left ventricular systolic  function with an estimated ejection fraction of 40%. TTE (Dr. Jacqueline Staton, 5/5/2021)  Summary   Left ventricle is normal in size . Moderate to severe concentric left ventricular hypertrophy. There is apical wall thinning an akinesis   Ejection fraction is visually estimated at 45-50%. There is doppler evidence of stage I diastolic dysfunction. Normal right ventricular size and function. Normal sized left atrium. No valvular abnormalities. PAST SURGICAL HISTORY:    Past Surgical History:   Procedure Laterality Date    CARDIAC CATHETERIZATION  05/03/2021    Dr. Raphael Cisneros N/A 5/10/2021    CABG CORONARY ARTERY BYPASS, PAULETTE performed by Brissa Panda DO at [address]:  Prior to Admission medications    Medication Sig Start Date End Date Taking?  Authorizing Provider   atorvastatin (LIPITOR) 80 MG tablet Take 0.5 tablets by mouth nightly 5/14/21  Yes Mary Block, PA-C   lisinopril (PRINIVIL;ZESTRIL) 10 MG tablet Take 1 tablet by mouth daily 5/15/21  Yes Mary Block, PA-C   metoprolol tartrate (LOPRESSOR) 25 MG tablet Take 0.5 tablets by mouth 2 times daily 5/14/21  Yes Mary Block, PA-C   clopidogrel (PLAVIX) 75 MG tablet Take 1 tablet by mouth daily 5/15/21 7/14/21 Yes Mary Block, PA-C   aspirin 81 MG chewable tablet Take 81 mg by mouth daily   Yes Historical Provider, MD   ibuprofen (ADVIL;MOTRIN) 200 MG tablet Take 200 mg by mouth every 6 hours as needed for Pain   Yes Historical Provider, MD       CURRENT MEDICATIONS:      Current Outpatient Medications:     atorvastatin (LIPITOR) 80 MG tablet, Take 0.5 tablets by mouth nightly, Disp: 30 tablet, Rfl: 1   lisinopril (PRINIVIL;ZESTRIL) 10 MG tablet, Take 1 tablet by mouth daily, Disp: 30 tablet, Rfl: 3    metoprolol tartrate (LOPRESSOR) 25 MG tablet, Take 0.5 tablets by mouth 2 times daily, Disp: 60 tablet, Rfl: 3    clopidogrel (PLAVIX) 75 MG tablet, Take 1 tablet by mouth daily, Disp: 30 tablet, Rfl: 3    aspirin 81 MG chewable tablet, Take 81 mg by mouth daily, Disp: , Rfl:     ibuprofen (ADVIL;MOTRIN) 200 MG tablet, Take 200 mg by mouth every 6 hours as needed for Pain, Disp: , Rfl:       ALLERGIES:  Patient has no known allergies. SOCIAL HISTORY:    Social History     Socioeconomic History    Marital status:      Spouse name: Not on file    Number of children: Not on file    Years of education: Not on file    Highest education level: Not on file   Occupational History    Not on file   Tobacco Use    Smoking status: Former Smoker     Packs/day: 0.03     Types: Cigars     Start date: 2000     Quit date: 2021     Years since quittin.1    Smokeless tobacco: Never Used    Tobacco comment: 5 skinny cigars a day. Vaping Use    Vaping Use: Never used   Substance and Sexual Activity    Alcohol use: No    Drug use: No    Sexual activity: Yes     Partners: Female   Other Topics Concern    Not on file   Social History Narrative    Not on file     Social Determinants of Health     Financial Resource Strain:     Difficulty of Paying Living Expenses:    Food Insecurity:     Worried About Running Out of Food in the Last Year:     [address] in the Last Year:    Transportation Needs:     Lack of Transportation (Medical):      Lack of Transportation (Non-Medical):    Physical Activity:     Days of Exercise per Week:     Minutes of Exercise per Session:    Stress:     Feeling of Stress :    Social Connections:     Frequency of Communication with Friends and Family:     Frequency of Social Gatherings with Friends and Family:     Attends [employer] Services:     Active Member of Clubs or Organizations:     Attends Club or Organization Meetings:     Marital Status:    Intimate Partner Violence:     Fear of Current or Ex-Partner:     Emotionally Abused:     Physically Abused:     Sexually Abused:        FAMILY HISTORY:   Family History   Problem Relation Age of Onset    Diabetes Mother     Diabetes Father     High Blood Pressure Father     Stroke Father     Cancer Paternal Grandfather     No Known Problems Sister     High Blood Pressure Brother     No Known Problems Sister     No Known Problems Sister     No Known Problems Sister          REVIEW OF SYSTEMS:     · Constitutional: +weight gain. Denies fevers, chills, night sweats, and generalized fatigue. · HEENT: Denies headaches, nose bleeds, rhinorrhea, sore throat. Denies blurred vision. Denies dysphagia, odynophagia. · Musculoskeletal: Denies falls, pain to BLE with ambulation. Denies muscle weakness. · Neurological: Denies dizziness and lightheadedness, numbness and tingling. Denies focal neurological deficits. · Cardiovascular: +peripheral edema. Denies chest pain on exertion, palpitations, diaphoresis. Denies near syncope, syncope, PND, orthopnea. · Respiratory: Denies shortness of breath at rest or with exertion. Denies cough, hemoptysis. · Gastrointestinal: Denies abdominal pain, nausea/vomiting, diarrhea and constipation, black/bloody, and tarry stools. · Genitourinary: Denies dysuria and hematuria. · Hematologic: Denies excessive bruising or bleeding. · Endocrine: Denies excessive thirst. Denies intolerance to hot and cold. PHYSICAL EXAM:   BP (!) 158/70 (Site: Right Upper Arm, Position: Sitting, Cuff Size: Medium Adult)   Pulse 51   Resp 20   Ht 5' 11\" (1.803 m)   Wt 228 lb 6.4 oz (103.6 kg)   SpO2 97%   BMI 31.86 kg/m²   CONST:  Well developed, obese AAM who appears stated age. Awake, alert, cooperative, no apparent distress. HEENT:   Head- Normocephalic, atraumatic.    Eyes- Conjunctivae pink, anicteric. Neck-  No stridor, trachea midline, no apparent jugular venous distention. CHEST: Chest symmetrical and non-tender to palpation. No accessory muscle use or intercostal retractions. RESPIRATORY: Lung sounds - clear throughout fields. No wheezing, rales or rhonchi. CARDIOVASCULAR:     No noted carotid bruit. Heart Ausculation- Regular rhythm with slow rate, no apparent murmur. PV: Trace LLE edema, 1+ RLE edema. Pedal pulses palpable, no clubbing or cyanosis. ABDOMEN: Soft, non-tender to light palpation. Bowel sounds present. MS: Good muscle strength and tone. No atrophy or abnormal movements. : Deferred  SKIN: Warm and dry. NEURO / PSYCH: Oriented to person, place and time. Speech clear and appropriate. Follows all commands. Pleasant affect. DATA:    EKG 6/23/2021 (interpreted by me):  SB, HR 51 bpm  Findings consistent with LVH  Anterolateral non-specific ST changes, present on prior EKGs      Labs:   HgA1c:   Lab Results   Component Value Date    LABA1C 5.8 (H) 05/02/2021     FASTING LIPID PANEL:  Lab Results   Component Value Date    CHOL 230 05/02/2021    HDL 37 05/02/2021    LDLCALC 177 05/02/2021    TRIG 80 05/02/2021         ASSESSMENT:  1. Coronary artery disease status-post CABG x 3 (LIMA-LAD, SVG-OM, SVG-PDA) with LAD endarterectomy on May 10, 2021 [facility] by Dr. Gilma Li. Clinically stable. 2. Chronic heart failure with mildly reduced EF. EF 45-50% on TTE May 2021. Appears euvolemic on exam, with only mild pitting edema of his RLE (vein graft). 3. Ischemic cardiomyopathy. 4. Baseline sinus bradycardia, asymptomatic. 5. Hypertension, uncontrolled. 6. Hyperlipidemia, on statin therapy. 7. Tobacco abuse. 8. Obesity. PLAN:   1. Up-titrate Lisinopril to 20 mg daily in setting of uncontrolled hypertension and mild LV dysfunction.   2. Advised patient to monitor his blood pressure daily at home (after sitting down in a relaxed state for 5-10 minutes) over the next week, document it, and call us with an update next Thursday or Friday. Will assess need for further antihypertension medication adjustments at that time. 3. Switch Lopressor to Toprol-XL in setting of LV dysfunction. Unable to up-titrate due to baseline sinus bradycardia. 4. Add Lasix 20 mg daily PRN for worsening lower extremity edema, SOB, orthopnea or further weight gain. This was discussed in detail with the patient and he expressed understanding. 5. Continue other cardiac medications the same at this time. 6. Check CMP, BNP, CBC.  7. Re-check lipid panel at time of next OV, may need titration to 80 mg daily of Lipitor if not at goal.  8. CHF clinic referral deferred at this time. 9. Limited TTE end of August for re-evaluation of LV function now that patient has been revascularized and treated with GDMT.  10. Follow up in two months with Dr. Corbin Escalante, or sooner if needed. The patient's current medication list, allergies, problem list, recent labs and diagnostic testing were reviewed at today's visit.       Roberto Bernheim, [address] [place] [address], [facility] [address] Card[facility]    Electronically signed by Brenden Fam PA-C on 6/23/2021 at 9:06 AM

## 2021-06-23 ENCOUNTER — OFFICE VISIT (OUTPATIENT)
Dept: CARDIOLOGY CLINIC | Age: 51
End: 2021-06-23

## 2021-06-23 VITALS
HEIGHT: 71 IN | DIASTOLIC BLOOD PRESSURE: 78 MMHG | OXYGEN SATURATION: 97 % | SYSTOLIC BLOOD PRESSURE: 158 MMHG | HEART RATE: 51 BPM | BODY MASS INDEX: 31.98 KG/M2 | WEIGHT: 228.4 LBS | RESPIRATION RATE: 20 BRPM

## 2021-06-23 DIAGNOSIS — I51.9 LV DYSFUNCTION: ICD-10-CM

## 2021-06-23 DIAGNOSIS — I25.10 CORONARY ARTERY DISEASE INVOLVING NATIVE CORONARY ARTERY OF NATIVE HEART WITHOUT ANGINA PECTORIS: Primary | ICD-10-CM

## 2021-06-23 PROCEDURE — 93000 ELECTROCARDIOGRAM COMPLETE: CPT | Performed by: INTERNAL MEDICINE

## 2021-06-23 PROCEDURE — 99214 OFFICE O/P EST MOD 30 MIN: CPT | Performed by: PHYSICIAN ASSISTANT

## 2021-06-23 RX ORDER — FUROSEMIDE 20 MG/1
20 TABLET ORAL PRN
Qty: 90 TABLET | Refills: 1 | Status: SHIPPED
Start: 2021-06-23 | End: 2021-11-29 | Stop reason: SDUPTHER

## 2021-06-23 RX ORDER — METOPROLOL SUCCINATE 25 MG
12.5 TABLET, EXTENDED RELEASE 24 HR ORAL 2 TIMES DAILY
Qty: 30 TABLET | Refills: 3
Start: 2021-06-23

## 2021-06-23 RX ORDER — LISINOPRIL 20 MG/1
20 TABLET ORAL DAILY
Qty: 60 TABLET | Refills: 1 | Status: SHIPPED
Start: 2021-06-23 | End: 2021-07-08

## 2021-06-24 ENCOUNTER — OFFICE VISIT (OUTPATIENT)
Dept: FAMILY MEDICINE CLINIC | Age: 51
End: 2021-06-24

## 2021-06-24 ENCOUNTER — HOSPITAL ENCOUNTER (EMERGENCY)
Age: 51
Discharge: HOME | End: 2021-06-24
Attending: EMERGENCY MEDICINE

## 2021-06-24 VITALS
HEART RATE: 55 BPM | TEMPERATURE: 97.8 F | DIASTOLIC BLOOD PRESSURE: 107 MMHG | SYSTOLIC BLOOD PRESSURE: 210 MMHG | HEIGHT: 71 IN | WEIGHT: 226.6 LBS | BODY MASS INDEX: 31.72 KG/M2

## 2021-06-24 VITALS
WEIGHT: 230 LBS | BODY MASS INDEX: 32.2 KG/M2 | DIASTOLIC BLOOD PRESSURE: 93 MMHG | HEART RATE: 50 BPM | HEIGHT: 71 IN | RESPIRATION RATE: 14 BRPM | SYSTOLIC BLOOD PRESSURE: 189 MMHG | OXYGEN SATURATION: 99 % | TEMPERATURE: 97 F

## 2021-06-24 DIAGNOSIS — I16.0 ASYMPTOMATIC HYPERTENSIVE URGENCY: Primary | ICD-10-CM

## 2021-06-24 DIAGNOSIS — I10 ASYMPTOMATIC HYPERTENSION: Primary | ICD-10-CM

## 2021-06-24 LAB
ANION GAP SERPL CALCULATED.3IONS-SCNC: 6 MMOL/L (ref 7–16)
BASOPHILS ABSOLUTE: 0.03 E9/L (ref 0–0.2)
BASOPHILS RELATIVE PERCENT: 0.6 % (ref 0–2)
BUN BLDV-MCNC: 14 MG/DL (ref 6–20)
CALCIUM SERPL-MCNC: 9.6 MG/DL (ref 8.6–10.2)
CHLORIDE BLD-SCNC: 106 MMOL/L (ref 98–107)
CO2: 27 MMOL/L (ref 22–29)
CREAT SERPL-MCNC: 1 MG/DL (ref 0.7–1.2)
EOSINOPHILS ABSOLUTE: 0.33 E9/L (ref 0.05–0.5)
EOSINOPHILS RELATIVE PERCENT: 6.4 % (ref 0–6)
GFR AFRICAN AMERICAN: >60
GFR NON-AFRICAN AMERICAN: >60 ML/MIN/1.73
GLUCOSE BLD-MCNC: 83 MG/DL (ref 74–99)
HCT VFR BLD CALC: 39.2 % (ref 37–54)
HEMOGLOBIN: 13 G/DL (ref 12.5–16.5)
IMMATURE GRANULOCYTES #: 0 E9/L
IMMATURE GRANULOCYTES %: 0 % (ref 0–5)
LYMPHOCYTES ABSOLUTE: 2.68 E9/L (ref 1.5–4)
LYMPHOCYTES RELATIVE PERCENT: 51.9 % (ref 20–42)
MCH RBC QN AUTO: 32.5 PG (ref 26–35)
MCHC RBC AUTO-ENTMCNC: 33.2 % (ref 32–34.5)
MCV RBC AUTO: 98 FL (ref 80–99.9)
MONOCYTES ABSOLUTE: 0.38 E9/L (ref 0.1–0.95)
MONOCYTES RELATIVE PERCENT: 7.4 % (ref 2–12)
NEUTROPHILS ABSOLUTE: 1.74 E9/L (ref 1.8–7.3)
NEUTROPHILS RELATIVE PERCENT: 33.7 % (ref 43–80)
PDW BLD-RTO: 13.3 FL (ref 11.5–15)
PLATELET # BLD: 293 E9/L (ref 130–450)
PMV BLD AUTO: 8.2 FL (ref 7–12)
POTASSIUM REFLEX MAGNESIUM: 4.2 MMOL/L (ref 3.5–5)
RBC # BLD: 4 E12/L (ref 3.8–5.8)
SODIUM BLD-SCNC: 139 MMOL/L (ref 132–146)
WBC # BLD: 5.2 E9/L (ref 4.5–11.5)

## 2021-06-24 PROCEDURE — 96376 TX/PRO/DX INJ SAME DRUG ADON: CPT

## 2021-06-24 PROCEDURE — 96374 THER/PROPH/DIAG INJ IV PUSH: CPT

## 2021-06-24 PROCEDURE — 85025 COMPLETE CBC W/AUTO DIFF WBC: CPT

## 2021-06-24 PROCEDURE — 93005 ELECTROCARDIOGRAM TRACING: CPT | Performed by: EMERGENCY MEDICINE

## 2021-06-24 PROCEDURE — 80048 BASIC METABOLIC PNL TOTAL CA: CPT

## 2021-06-24 PROCEDURE — 99214 OFFICE O/P EST MOD 30 MIN: CPT | Performed by: FAMILY MEDICINE

## 2021-06-24 PROCEDURE — 6370000000 HC RX 637 (ALT 250 FOR IP): Performed by: EMERGENCY MEDICINE

## 2021-06-24 PROCEDURE — 6360000002 HC RX W HCPCS: Performed by: EMERGENCY MEDICINE

## 2021-06-24 PROCEDURE — 99283 EMERGENCY DEPT VISIT LOW MDM: CPT

## 2021-06-24 RX ORDER — CLONIDINE HYDROCHLORIDE 0.1 MG/1
TABLET ORAL
Qty: 1 TABLET | Refills: 0 | Status: SHIPPED | OUTPATIENT
Start: 2021-06-24 | End: 2021-06-24

## 2021-06-24 RX ORDER — CLONIDINE HYDROCHLORIDE 0.1 MG/1
0.1 TABLET ORAL ONCE
Status: COMPLETED | OUTPATIENT
Start: 2021-06-24 | End: 2021-06-24

## 2021-06-24 RX ORDER — HYDRALAZINE HYDROCHLORIDE 20 MG/ML
5 INJECTION INTRAMUSCULAR; INTRAVENOUS ONCE
Status: COMPLETED | OUTPATIENT
Start: 2021-06-24 | End: 2021-06-24

## 2021-06-24 RX ADMIN — CLONIDINE HYDROCHLORIDE 0.1 MG: 0.1 TABLET ORAL at 13:13

## 2021-06-24 RX ADMIN — CLONIDINE HYDROCHLORIDE 0.1 MG: 0.1 TABLET ORAL at 15:01

## 2021-06-24 RX ADMIN — HYDRALAZINE HYDROCHLORIDE 5 MG: 20 INJECTION INTRAMUSCULAR; INTRAVENOUS at 13:45

## 2021-06-24 RX ADMIN — HYDRALAZINE HYDROCHLORIDE 5 MG: 20 INJECTION INTRAMUSCULAR; INTRAVENOUS at 14:16

## 2021-06-24 ASSESSMENT — ENCOUNTER SYMPTOMS
BACK PAIN: 0
RHINORRHEA: 0
SORE THROAT: 0
NAUSEA: 0
VOMITING: 0
CONSTIPATION: 0
SHORTNESS OF BREATH: 0
COUGH: 0
SORE THROAT: 0
SHORTNESS OF BREATH: 0
EYE PAIN: 0
COUGH: 0
VOMITING: 0
TROUBLE SWALLOWING: 0
NAUSEA: 0
ABDOMINAL PAIN: 0
DIARRHEA: 0
BLOOD IN STOOL: 0
DIARRHEA: 0
ABDOMINAL PAIN: 0
WHEEZING: 0

## 2021-06-24 NOTE — ED PROVIDER NOTES
HISTORY ---------------------------------------------  Past Medical History:  has a past medical history of CAD (coronary artery disease), Hyperlipidemia, Hypertension, and Ischemic cardiomyopathy. Past Surgical History:  has a past surgical history that includes Cardiac catheterization (05/03/2021) and Coronary artery bypass graft (N/A, 5/10/2021). Social History:  reports that he quit smoking about 7 weeks ago. His smoking use included cigars. He has a 0.45 pack-year smoking history. He has never used smokeless tobacco. He reports previous alcohol use. He reports that he does not use drugs. Family History: family history includes Cancer in his father and paternal grandfather; Diabetes in his father and mother; High Blood Pressure in his brother and father; No Known Problems in his sister, sister, sister, and sister; Stroke in his father. The patients home medications have been reviewed. Allergies: Patient has no known allergies.     -------------------------------------------------- RESULTS -------------------------------------------------  Labs:  Results for orders placed or performed during the hospital encounter of 06/24/21   CBC Auto Differential   Result Value Ref Range    WBC 5.2 4.5 - 11.5 E9/L    RBC 4.00 3.80 - 5.80 E12/L    Hemoglobin 13.0 12.5 - 16.5 g/dL    Hematocrit 39.2 37.0 - 54.0 %    MCV 98.0 80.0 - 99.9 fL    MCH 32.5 26.0 - 35.0 pg    MCHC 33.2 32.0 - 34.5 %    RDW 13.3 11.5 - 15.0 fL    Platelets 524 688 - 263 E9/L    MPV 8.2 7.0 - 12.0 fL    Neutrophils % 33.7 (L) 43.0 - 80.0 %    Immature Granulocytes % 0.0 0.0 - 5.0 %    Lymphocytes % 51.9 (H) 20.0 - 42.0 %    Monocytes % 7.4 2.0 - 12.0 %    Eosinophils % 6.4 (H) 0.0 - 6.0 %    Basophils % 0.6 0.0 - 2.0 %    Neutrophils Absolute 1.74 (L) 1.80 - 7.30 E9/L    Immature Granulocytes # 0.00 E9/L    Lymphocytes Absolute 2.68 1.50 - 4.00 E9/L    Monocytes Absolute 0.38 0.10 - 0.95 E9/L    Eosinophils Absolute 0.33 0.05 - 0.50 E9/L Basophils Absolute 0.03 0.00 - 0.20 M8/V   Basic Metabolic Panel w/ Reflex to MG   Result Value Ref Range    Sodium 139 132 - 146 mmol/L    Potassium reflex Magnesium 4.2 3.5 - 5.0 mmol/L    Chloride 106 98 - 107 mmol/L    CO2 27 22 - 29 mmol/L    Anion Gap 6 (L) 7 - 16 mmol/L    Glucose 83 74 - 99 mg/dL    BUN 14 6 - 20 mg/dL    CREATININE 1.0 0.7 - 1.2 mg/dL    GFR Non-African American >60 >=60 mL/min/1.73    GFR African American >60     Calcium 9.6 8.6 - 10.2 mg/dL   EKG 12 Lead   Result Value Ref Range    Ventricular Rate 47 BPM    Atrial Rate 47 BPM    P-R Interval 200 ms    QRS Duration 104 ms    Q-T Interval 510 ms    QTc Calculation (Bazett) 451 ms    P Axis 49 degrees    R Axis 14 degrees    T Axis 131 degrees       Radiology:  No orders to display       EKG: This EKG is signed and interpreted by ED Physician. Time:  1245   Rate: 47  Rhythm: Sinus. Interpretation: sinus bradycardia. Normal axis deviation. Inverted T waves throughout the lateral leads. V3-v6 seen on prior. Vs seems to be new. Inversion in 1, avl old. LVH. qrs 451  Comparison: changes compared to previous EKG.      ------------------------- NURSING NOTES AND VITALS REVIEWED ---------------------------  Date / Time Roomed:  6/24/2021 11:34 AM  ED Bed Assignment:  [facility] [id number]    The nursing notes within the ED encounter and vital signs as below have been reviewed.    BP (!) 189/93   Pulse 50   Temp 97 °F (36.1 °C) (Tympanic)   Resp 14   Ht 5' 11\" (1.803 m)   Wt 230 lb (104.3 kg)   SpO2 99%   BMI 32.08 kg/m²   Oxygen Saturation Interpretation: Normal      ------------------------------------------ PROGRESS NOTES ------------------------------------------  ED COURSE MEDICATIONS:                Medications   hydrALAZINE (APRESOLINE) injection 5 mg (5 mg Intravenous Given 6/24/21 1345)   hydrALAZINE (APRESOLINE) injection 5 mg (5 mg Intravenous Given 6/24/21 1416)   cloNIDine (CATAPRES) tablet 0.1 mg (0.1 mg Oral Given 6/24/21 1500)       I have spoken with the patient and discussed todays results, in addition to providing specific details for the plan of care and counseling regarding the diagnosis and prognosis. Their questions are answered at this time and they are agreeable with the plan. I discussed at length with them reasons for immediate return here for re evaluation. They will followup with primary care by calling their office tomorrow. --------------------------------- ADDITIONAL PROVIDER NOTES ---------------------------------  At this time the patient is without objective evidence of an acute process requiring hospitalization or inpatient management. They have remained hemodynamically stable throughout their entire ED visit and are stable for discharge with outpatient follow-up. The plan has been discussed in detail and they are aware of the specific conditions for emergent return, as well as the importance of follow-up. Discharge Medication List as of 6/24/2021  3:42 PM          Diagnosis:  1. Asymptomatic hypertension        Disposition:  Patient's disposition: Discharge to home  Patient's condition is stable.         Renate Murillo,   Resident  06/25/21 1013       Renate Murillo,   Resident  06/25/21 1014

## 2021-06-24 NOTE — PROGRESS NOTES
Subjective:    Cj Patton is here for a follow up for a CABG. He did see cardiology yesterday and had Lisinopril increased and Metoprolol added. ROS: Otherwise negative    Patient Active Problem List   Diagnosis    Essential hypertension    NSTEMI (non-ST elevated myocardial infarction) ([facility] [address]    Chest pain       Past medical, surgical, family and social history were reviewed, non-contributory, and unchanged unless otherwise stated. Objective:    BP (!) 210/107   Pulse 55   Temp 97.8 °F (36.6 °C) (Temporal)   Ht 5' 11\" (1.803 m)   Wt 226 lb 9.6 oz (102.8 kg)   BMI 31.60 kg/m²     Exam is as noted by resident with the following changes, additions or corrections:      Assessment/Plan:        Cj Patton was seen today for 6 month follow-up. Diagnoses and all orders for this visit:    Asymptomatic hypertensive urgency  -     cloNIDine (CATAPRES) tablet 0.1 mg    Other orders  -     Discontinue: cloNIDine (CATAPRES) 0.1 MG tablet; One dose of  0.1 to be given in office. Attending Physician Statement    I have reviewed the chart, including any radiology or labs. I have discussed the case, including pertinent history and exam findings with the resident. I agree with the assessment, plan and orders as documented by the resident. Please refer to the resident note for additional information.       Electronically signed by Jerman Owens DO on 6/26/2021 at 10:30 AM

## 2021-06-24 NOTE — PROGRESS NOTES
[facility]  Department of Family Medicine  Family Medicine Residency Program      Patient: Love Correa 45 y.o. male     Date of Service: 21      Chief complaint:   Chief Complaint   Patient presents with    6 Month Follow-Up     HTN        HISTORY OF PRESENTING ILLNESS     45 y.o. male presented to the clinic for regular follow up  He is s/p CABG done in may 2021     HTN: takes lisinopril 10 mg every day and watches salt intake. His BP at cardiology clinic was 158/78 , hence lisinopril was increased from 10 mg to 20 mg. Pt stated he has to still  new prescription so he took his 10 mg this am. Toprol 25 mg was also added yesterday by cardiology. He denies any chest pain, palpitations, headache, blurry vision or any acute concerns at this time. Health Maintenance:  Health Maintenance Due   Topic Date Due    Hepatitis C screen  Never done    HIV screen  Never done    Shingles Vaccine (1 of 2) Never done    Colon cancer screen colonoscopy  Never done     Past Medical History:      Diagnosis Date    CAD (coronary artery disease)     nstemi     Hyperlipidemia     Hypertension     Ischemic cardiomyopathy      Past Surgical History:        Procedure Laterality Date    CARDIAC CATHETERIZATION  2021    Dr. Sydni Oconnor N/A 5/10/2021    CABG CORONARY ARTERY BYPASS, PAULETTE performed by Kristine Urbina DO at [address] OR     Allergies:    Patient has no known allergies.   Social History:   Social History     Socioeconomic History    Marital status:      Spouse name: Not on file    Number of children: Not on file    Years of education: Not on file    Highest education level: Not on file   Occupational History    Not on file   Tobacco Use    Smoking status: Former Smoker     Packs/day: 0.03     Years: 15.00     Pack years: 0.45     Types: Cigars     Quit date: 2021     Years since quittin.1    Smokeless tobacco: Never Used    pain, blood in stool, constipation, diarrhea, nausea and vomiting. Genitourinary: Negative for difficulty urinating. Musculoskeletal: Negative for arthralgias and myalgias. Skin: Negative for rash and wound. Neurological: Negative for dizziness, light-headedness and headaches. Psychiatric/Behavioral: The patient is not nervous/anxious. PHYSICAL EXAM   Vitals: BP (!) 197/109   Pulse 55   Temp 97.8 °F (36.6 °C) (Temporal)   Ht 5' 11\" (1.803 m)   Wt 226 lb 9.6 oz (102.8 kg)   BMI 31.60 kg/m²   Physical Exam  Vitals and nursing note reviewed. Constitutional:       Appearance: Normal appearance. Eyes:      Extraocular Movements: Extraocular movements intact. Conjunctiva/sclera: Conjunctivae normal.   Cardiovascular:      Rate and Rhythm: Normal rate and regular rhythm. Heart sounds: No murmur heard. Pulmonary:      Effort: Pulmonary effort is normal.      Breath sounds: Normal breath sounds. No wheezing, rhonchi or rales. Abdominal:      General: Abdomen is flat. Bowel sounds are normal.      Palpations: Abdomen is soft. Tenderness: There is no abdominal tenderness. Musculoskeletal:         General: Normal range of motion. Cervical back: Normal range of motion. Right lower leg: No edema. Left lower leg: No edema. Lymphadenopathy:      Cervical: No cervical adenopathy. Skin:     General: Skin is warm. Findings: No lesion or rash. Neurological:      Mental Status: He is alert. Cranial Nerves: No cranial nerve deficit. Sensory: No sensory deficit. ASSESSMENT AND PLAN     1.  Asymptomatic hypertensive urgency  Persistently elevated BP in office   administered one dose of 0.1mg clonidine, repeat after 15-20 min was 210/107 (manual)   Spoke to Dr. Magnus Strickland (ER), discussed the case  Will send pt to Er for further eval and tx  Patient is asymptomatic at this time    Encouraged Low fat, low cholesterol, Reduce sodium intake and 30 minutes of exercise daily    Counseled regarding above diagnosis, including possible risks and complications, especially if left uncontrolled. Counseled regarding the possible side effects, risks, benefits and alternatives to treatment; patient and/or guardian verbalizes understanding, agrees, feels comfortable with and wishes to proceed with above treatment plan. Call or go to ED immediately if symptoms worsen or persist. Advised patient to call with any new medication issues, and, as applicable, read all Rx info from pharmacy to assure aware of all possible risks and side effects of medication before taking. Patient and/or guardian given opportunity to ask questions/raise concerns. The patient verbalized comfort and understanding of instructions. I encourage further reading and education about your health conditions. Information on many health conditions is provided by the [employer] Academy [employer]: https://familydoctor. org/  Please bring any questions to me at your next visit. Medication List:    Current Outpatient Medications   Medication Sig Dispense Refill    cloNIDine (CATAPRES) 0.1 MG tablet One dose of  0.1 to be given in office. 1 tablet 0    lisinopril (PRINIVIL;ZESTRIL) 20 MG tablet Take 1 tablet by mouth daily 60 tablet 1    TOPROL XL 25 MG extended release tablet Take 0.5 tablets by mouth 2 times daily 30 tablet 3    furosemide (LASIX) 20 MG tablet Take 1 tablet by mouth as needed (swelling, weight gain or shortness of breath) 90 tablet 1    atorvastatin (LIPITOR) 80 MG tablet Take 0.5 tablets by mouth nightly 30 tablet 1    clopidogrel (PLAVIX) 75 MG tablet Take 1 tablet by mouth daily 30 tablet 3    aspirin 81 MG chewable tablet Take 81 mg by mouth daily      ibuprofen (ADVIL;MOTRIN) 200 MG tablet Take 200 mg by mouth every 6 hours as needed for Pain       No current facility-administered medications for this visit.       Return to Office: Return in about 2 weeks (around 7/8/2021). This document may have been prepared at least partially through the use of voice recognition software. Although effort is taken to assure the accuracy of this document, it is possible that grammatical, syntax,  or spelling errors may occur.     Mady Waddell MD

## 2021-06-24 NOTE — ED NOTES
Bed:  SILVEIRA-  Expected date:   Expected time:   Means of arrival:   Comments:  [address] [facility] [address], RN  06/24/21 [id number]

## 2021-06-25 LAB
EKG ATRIAL RATE: 47 BPM
EKG P AXIS: 49 DEGREES
EKG P-R INTERVAL: 200 MS
EKG Q-T INTERVAL: 510 MS
EKG QRS DURATION: 104 MS
EKG QTC CALCULATION (BAZETT): 451 MS
EKG R AXIS: 14 DEGREES
EKG T AXIS: 131 DEGREES
EKG VENTRICULAR RATE: 47 BPM

## 2021-06-25 PROCEDURE — 93010 ELECTROCARDIOGRAM REPORT: CPT | Performed by: INTERNAL MEDICINE

## 2021-07-08 ENCOUNTER — OFFICE VISIT (OUTPATIENT)
Dept: FAMILY MEDICINE CLINIC | Age: 51
End: 2021-07-08

## 2021-07-08 VITALS
TEMPERATURE: 97.1 F | RESPIRATION RATE: 16 BRPM | DIASTOLIC BLOOD PRESSURE: 113 MMHG | WEIGHT: 228.4 LBS | BODY MASS INDEX: 31.98 KG/M2 | OXYGEN SATURATION: 96 % | SYSTOLIC BLOOD PRESSURE: 185 MMHG | HEIGHT: 71 IN | HEART RATE: 56 BPM

## 2021-07-08 DIAGNOSIS — I16.0 ASYMPTOMATIC HYPERTENSIVE URGENCY: Primary | ICD-10-CM

## 2021-07-08 PROCEDURE — 99213 OFFICE O/P EST LOW 20 MIN: CPT | Performed by: FAMILY MEDICINE

## 2021-07-08 RX ORDER — LISINOPRIL 40 MG/1
40 TABLET ORAL DAILY
Qty: 60 TABLET | Refills: 2 | Status: SHIPPED | OUTPATIENT
Start: 2021-07-08

## 2021-07-08 RX ORDER — METOPROLOL SUCCINATE 25 MG/1
25 TABLET, EXTENDED RELEASE ORAL DAILY
Qty: 30 TABLET | Refills: 3 | Status: SHIPPED | OUTPATIENT
Start: 2021-07-08

## 2021-07-08 SDOH — ECONOMIC STABILITY: FOOD INSECURITY: WITHIN THE PAST 12 MONTHS, YOU WORRIED THAT YOUR FOOD WOULD RUN OUT BEFORE YOU GOT MONEY TO BUY MORE.: NEVER TRUE

## 2021-07-08 SDOH — ECONOMIC STABILITY: FOOD INSECURITY: WITHIN THE PAST 12 MONTHS, THE FOOD YOU BOUGHT JUST DIDN'T LAST AND YOU DIDN'T HAVE MONEY TO GET MORE.: NEVER TRUE

## 2021-07-08 ASSESSMENT — SOCIAL DETERMINANTS OF HEALTH (SDOH): HOW HARD IS IT FOR YOU TO PAY FOR THE VERY BASICS LIKE FOOD, HOUSING, MEDICAL CARE, AND HEATING?: NOT HARD AT ALL

## 2021-07-08 NOTE — PROGRESS NOTES
[facility]  Precepting Note    Subjective:  F/u of HTN  Hx of CABG recently  BP is elevated  Has significant family stress  Is not taking Toprol prescribed by cardiology but taking Lopressor he had at home  ROS otherwise negative     Past medical, surgical, family and social history were reviewed, non-contributory, and unchanged unless otherwise stated. Objective:    BP (!) 185/113   Pulse 56   Temp 97.1 °F (36.2 °C)   Resp 16   Ht 5' 11\" (1.803 m)   Wt 228 lb 6.4 oz (103.6 kg)   SpO2 96%   BMI 31.86 kg/m²     Exam is as noted by resident with the following changes, additions or corrections:    General:  NAD; alert & oriented x 3   Heart:  RRR, no murmurs, gallops, or rubs. Lungs:  CTA bilaterally, no wheeze, rales or rhonchi  Abd: bowel sounds present, nontender, nondistended, no masses  Extrem:  No clubbing, cyanosis, or edema    Assessment/Plan:  Uncontrolled HTN  -  Increase dose of lisinopril  -  Continue Toprol 25 daily. Monitor for bradycardia  -  Monitor BP  F/u in 1 week         Attending Physician Statement  I have reviewed the chart, including any radiology or labs. I have discussed the case, including pertinent history and exam findings with the resident. I agree with the assessment, plan and orders as documented by the resident. Please refer to the resident note for additional information.       Electronically signed by Smitha Romero MD on 7/8/2021 at 9:34 AM

## 2021-07-09 ASSESSMENT — ENCOUNTER SYMPTOMS
CONSTIPATION: 0
VOMITING: 0
SORE THROAT: 0
ABDOMINAL PAIN: 0
DIARRHEA: 0
TROUBLE SWALLOWING: 0
NAUSEA: 0
COUGH: 0

## 2021-08-27 ENCOUNTER — HOSPITAL ENCOUNTER (OUTPATIENT)
Dept: CARDIOLOGY | Age: 51
Discharge: HOME | End: 2021-08-27

## 2021-08-27 DIAGNOSIS — I51.9 LV DYSFUNCTION: ICD-10-CM

## 2021-08-27 PROCEDURE — 93308 TTE F-UP OR LMTD: CPT

## 2021-08-31 ENCOUNTER — TELEPHONE (OUTPATIENT)
Dept: CARDIOLOGY CLINIC | Age: 51
End: 2021-08-31

## 2021-08-31 NOTE — TELEPHONE ENCOUNTER
----- Message from Selvin Padilla PA-C sent at 8/30/2021 12:12 PM EDT -----  Hi    Going to send it this way this time lol    This melanie was supposed to have a follow up appointment two months after I saw him in June. He follows with Dr. Xiomy Betts. He was seen in the ED since then for uncontrolled hypertension, so should follow up as instructed. Not sure if he's on the schedule_   for Dr. Xiomy Betts follow up    Let me know! Thanks :)       Karson Lombardi,    He is seeing April Bolton next week in [facility] [place].       Thanks Spring Marie

## 2021-11-01 RX ORDER — LISINOPRIL 20 MG/1
TABLET ORAL
Qty: 60 TABLET | Refills: 1 | Status: SHIPPED | OUTPATIENT
Start: 2021-11-01

## 2021-11-29 RX ORDER — FUROSEMIDE 20 MG/1
TABLET ORAL
Qty: 90 TABLET | Refills: 1 | Status: SHIPPED | OUTPATIENT
Start: 2021-11-29

## 2021-11-29 RX ORDER — FUROSEMIDE 20 MG/1
20 TABLET ORAL PRN
Qty: 30 TABLET | Refills: 0 | Status: SHIPPED | OUTPATIENT
Start: 2021-11-29

## 2022-07-11 RX ORDER — LISINOPRIL 20 MG/1
TABLET ORAL
Qty: 60 TABLET | Refills: 1 | OUTPATIENT
Start: 2022-07-11

## 2022-07-18 RX ORDER — LISINOPRIL 20 MG/1
TABLET ORAL
Qty: 60 TABLET | Refills: 1 | OUTPATIENT
Start: 2022-07-18

## (undated) DEVICE — CLOTH SURG PREP PREOPERATIVE CHLORHEXIDINE GLUC 2% READYPREP

## (undated) DEVICE — LABEL MED CARD SURG 4 IN PANEL STRL

## (undated) DEVICE — GLOVE SURG SZ 65 THK91MIL LTX FREE SYN POLYISOPRENE

## (undated) DEVICE — SET SURG BASIN OPEN HEART NO  1 REUSABLE

## (undated) DEVICE — MPS® DELIVERY SET W/ARREST AGENT AND ADDITIVE CASSETTES, HEAT EXCHANGER & 10 FT. DELIVERY TUBING: Brand: MPS

## (undated) DEVICE — SET CARDIAC II

## (undated) DEVICE — CONNECTOR PERF W64XH64XL64MM W  LUERLOCK

## (undated) DEVICE — DRAIN CHN 19FR L0.25MM DIA6.3MM SIL RND HUBLESS FULL FLUT

## (undated) DEVICE — TIP APPL GEL PLT ENDO 5MMX32CM

## (undated) DEVICE — TOWEL,OR,DSP,ST,BLUE,STD,6/PK,12PK/CS: Brand: MEDLINE

## (undated) DEVICE — BLOOD TRANSFUSION FILTER: Brand: HAEMONETICS

## (undated) DEVICE — GLOVE ORANGE PI 7 1/2   MSG9075

## (undated) DEVICE — CLIP LIG M BLU TI HRT SHP WIRE HORZ 600 PER BX

## (undated) DEVICE — CATHETER,URETHRAL,REDRUBBER,STRL,18FR: Brand: MEDLINE

## (undated) DEVICE — RETROGRADE CARDIOPLEGIA CATHETER: Brand: EDWARDS LIFESCIENCES RETROGRADE CARDIOPLEGIA CATHETER

## (undated) DEVICE — SURGICAL PROCEDURE PACK CRD SEHC

## (undated) DEVICE — GLOVE SURG SZ 6.5 L11.2IN FNGR THK9.8MIL STRW LTX POLYMER

## (undated) DEVICE — DRAIN SURG SGL COLL PT TB FOR ATS BG OASIS

## (undated) DEVICE — 3M™ TEGADERM™ CHG DRESSING 25/CARTON 4 CARTONS/CASE 1657: Brand: TEGADERM™

## (undated) DEVICE — CONNECTOR DRNGE W3/8-0.5XH3/16XL3/16IN 2:1 SIL CARD STR

## (undated) DEVICE — CARDIAC STRYKER STERNAL SAW

## (undated) DEVICE — GOWN,SIRUS,FABRNF,L,20/CS: Brand: MEDLINE

## (undated) DEVICE — TUBING, SUCTION, 3/16" X 12', STRAIGHT: Brand: MEDLINE

## (undated) DEVICE — BLADE CLIPPER GEN PURP NS

## (undated) DEVICE — ALCOHOL 70% RUBBING 16OZ

## (undated) DEVICE — PACK OPEN HRT DRP

## (undated) DEVICE — ADHESIVE SKIN CLOSURE TOP 36 CC HI VISC DERMBND MINI

## (undated) DEVICE — GLOVE ORANGE PI 7   MSG9070

## (undated) DEVICE — E-Z CLEAN, NON-STICK, PTFE COATED, ELECTROSURGICAL BLADE ELECTRODE, MODIFIED EXTENDED INSULATION, 6.5 INCH (16.5 CM): Brand: MEGADYNE

## (undated) DEVICE — 6 FOOT DISPOSABLE EXTENSION CABLE WITH SAFE CONNECT / SCREW-DOWN

## (undated) DEVICE — INSUFFLATION TUBING SET WITH FILTER, FUNNEL CONNECTOR AND LUER LOCK: Brand: JOSNOE MEDICAL INC

## (undated) DEVICE — SOLUTION IV 50ML 0.9% SOD CHL PLAS CONT USP VIAFLX

## (undated) DEVICE — DRAIN SURG L3/8-1/2IN DIA3/16IN SIL CARD CONN 1:1 BLAK

## (undated) DEVICE — CANNULA PERF 7FR L5.5IN AORT ROOT RADPQ STD TIP W/ VENT LN

## (undated) DEVICE — 1.5L THIN WALL CAN: Brand: CRD

## (undated) DEVICE — RETRACTOR SURG INSRT SUT HLD OCTOBASE

## (undated) DEVICE — GLOVE SURG SZ 6 THK91MIL LTX FREE SYN POLYISOPRENE ANTI

## (undated) DEVICE — BLOWER COR ART L16.5CM PLAS SHFT MAL W/ MIST IV SET AXIUS

## (undated) DEVICE — DISCONTINUED USE 320496 BATTERY 50-800-01 OR 50-800-03 SNGL USE

## (undated) DEVICE — SET ACB VEIN

## (undated) DEVICE — EZ GLIDE AORTIC CANNULA: Brand: EDWARDS LIFESCIENCES EZ GLIDE AORTIC CANNULA

## (undated) DEVICE — BASIC SINGLE BASIN 1-LF: Brand: MEDLINE INDUSTRIES, INC.

## (undated) DEVICE — MPS® PRESSURE LINE W/TRANSDUCER: Brand: MPS

## (undated) DEVICE — Z INACTIVE USE 2662641 SOLUTION IV 1000ML 140MEQ/L SOD 5MEQ/L K 3MEQ/L MG 27MEQ/L

## (undated) DEVICE — PICO 7 10CM X 30CM: Brand: PICO™ 7

## (undated) DEVICE — PADDLE INTERN DEFIB CHILD

## (undated) DEVICE — BLADE OPHTH 180DEG CUT SURF BLU STR SHRP DBL BVL GRINDLESS

## (undated) DEVICE — TAPE ADH W2INXL10YD WHT PAPR GENTLE BRTH FLX COMFORTABLE

## (undated) DEVICE — LANCET AORTOTOMY 3.3X10MM

## (undated) DEVICE — CANNULA INJ L2.5IN BLNT TIP 3MM CLR BODY W/ 1 W VLV DLP

## (undated) DEVICE — TAPE ADH W2INXL10YD PLAS TRNSPAR H2O RESIST HYPOALRG CURAD

## (undated) DEVICE — PERFUSION PACK CUST OPN HRT

## (undated) DEVICE — Device

## (undated) DEVICE — KIT PLT RATIO DISPNS KT 2IN CANN TIP SPRY TIP DISP MAGELLAN

## (undated) DEVICE — GOWN,SIRUS,FABRNF,XL,20/CS: Brand: MEDLINE

## (undated) DEVICE — SYRINGE MED 20ML STD CLR PLAS LUERSLIP TIP N CTRL DISP

## (undated) DEVICE — Device: Brand: VIRTUOSAPH PLUS WITH RADIAL INDICATION

## (undated) DEVICE — AGENT HEMSTAT W4XL8IN OXIDIZED REGENERATED CELOS ABSRB

## (undated) DEVICE — DRESSING FOAM W22XL25CM FILVE LAYR FOAM DP DEF SAFETAC

## (undated) DEVICE — SYRINGE MED 50ML LUERLOCK TIP

## (undated) DEVICE — TTL1LYR 16FR10ML 100%SIL TMPST TR: Brand: MEDLINE

## (undated) DEVICE — CLIP INT M L GRN TI TRNSVRS GRV CHEVRON SHP W/ PRECIS TIP

## (undated) DEVICE — BLADE OPHTH KNF D3MM 15DEG CATRCT BLU MICRO-SHARP

## (undated) DEVICE — Z DISCONTINUED USE 2624852 GLOVE SURG 7 PF TEXT NEOPRNE BRN STRL NEOLON 2G LF

## (undated) DEVICE — TUBING PERF PMP L10FT OD0.25IN ID1/16IN MED CLASS VI PRECUT

## (undated) DEVICE — Device: Brand: CLEANCUT ROTATING AORTIC PUNCH

## (undated) DEVICE — TOWEL,OR,DSP,ST,WHITE,DLX,4/PK,20PK/CS: Brand: MEDLINE

## (undated) DEVICE — SOLUTION IV IRRIG WATER 1000ML POUR BRL 2F7114

## (undated) DEVICE — RETRACTOR RULTRACT INTERN MAMMARY ARTERY

## (undated) DEVICE — CAMERA CARDIAC STRYKER 1488 HD

## (undated) DEVICE — SET CARDIAC I